# Patient Record
Sex: MALE | Race: WHITE | NOT HISPANIC OR LATINO | URBAN - METROPOLITAN AREA
[De-identification: names, ages, dates, MRNs, and addresses within clinical notes are randomized per-mention and may not be internally consistent; named-entity substitution may affect disease eponyms.]

---

## 2021-03-05 ENCOUNTER — INPATIENT (INPATIENT)
Facility: HOSPITAL | Age: 76
LOS: 4 days | Discharge: HOME | DRG: 197 | End: 2021-03-10
Attending: HOSPITALIST | Admitting: HOSPITALIST
Payer: MEDICARE

## 2021-03-05 VITALS
SYSTOLIC BLOOD PRESSURE: 102 MMHG | OXYGEN SATURATION: 88 % | HEART RATE: 95 BPM | RESPIRATION RATE: 24 BRPM | DIASTOLIC BLOOD PRESSURE: 71 MMHG | WEIGHT: 202.83 LBS

## 2021-03-05 DIAGNOSIS — D53.9 NUTRITIONAL ANEMIA, UNSPECIFIED: ICD-10-CM

## 2021-03-05 DIAGNOSIS — G47.30 SLEEP APNEA, UNSPECIFIED: ICD-10-CM

## 2021-03-05 DIAGNOSIS — I48.91 UNSPECIFIED ATRIAL FIBRILLATION: ICD-10-CM

## 2021-03-05 DIAGNOSIS — Q25.79 OTHER CONGENITAL MALFORMATIONS OF PULMONARY ARTERY: ICD-10-CM

## 2021-03-05 DIAGNOSIS — J47.9 BRONCHIECTASIS, UNCOMPLICATED: ICD-10-CM

## 2021-03-05 DIAGNOSIS — R63.8 OTHER SYMPTOMS AND SIGNS CONCERNING FOOD AND FLUID INTAKE: ICD-10-CM

## 2021-03-05 DIAGNOSIS — R17 UNSPECIFIED JAUNDICE: ICD-10-CM

## 2021-03-05 LAB
ALBUMIN SERPL ELPH-MCNC: 4.5 G/DL — SIGNIFICANT CHANGE UP (ref 3.3–5)
ALP SERPL-CCNC: 83 U/L — SIGNIFICANT CHANGE UP (ref 40–120)
ALT FLD-CCNC: 13 U/L — SIGNIFICANT CHANGE UP (ref 10–45)
ANION GAP SERPL CALC-SCNC: 12 MMOL/L — SIGNIFICANT CHANGE UP (ref 5–17)
ANISOCYTOSIS BLD QL: SIGNIFICANT CHANGE UP
APTT BLD: 29.5 SEC — SIGNIFICANT CHANGE UP (ref 27.5–35.5)
APTT BLD: 31.4 SEC — SIGNIFICANT CHANGE UP (ref 27.5–35.5)
AST SERPL-CCNC: 53 U/L — HIGH (ref 10–40)
BASE EXCESS BLDA CALC-SCNC: -1.2 MMOL/L — SIGNIFICANT CHANGE UP (ref -2–3)
BASE EXCESS BLDA CALC-SCNC: -2 MMOL/L — SIGNIFICANT CHANGE UP (ref -2–3)
BASE EXCESS BLDA CALC-SCNC: -2.7 MMOL/L — LOW (ref -2–3)
BASE EXCESS BLDA CALC-SCNC: -3.1 MMOL/L — LOW (ref -2–3)
BASE EXCESS BLDV CALC-SCNC: -1.5 MMOL/L — SIGNIFICANT CHANGE UP
BASOPHILS # BLD AUTO: 0 K/UL — SIGNIFICANT CHANGE UP (ref 0–0.2)
BASOPHILS NFR BLD AUTO: 0 % — SIGNIFICANT CHANGE UP (ref 0–2)
BILIRUB DIRECT SERPL-MCNC: 1.1 MG/DL — HIGH (ref 0–0.2)
BILIRUB INDIRECT FLD-MCNC: 2.9 MG/DL — HIGH (ref 0.2–1)
BILIRUB SERPL-MCNC: 4 MG/DL — HIGH (ref 0.2–1.2)
BILIRUB SERPL-MCNC: 4.8 MG/DL — HIGH (ref 0.2–1.2)
BLD GP AB SCN SERPL QL: NEGATIVE — SIGNIFICANT CHANGE UP
BUN SERPL-MCNC: 32 MG/DL — HIGH (ref 7–23)
BURR CELLS BLD QL SMEAR: PRESENT — SIGNIFICANT CHANGE UP
CA-I SERPL-SCNC: 1.16 MMOL/L — SIGNIFICANT CHANGE UP (ref 1.12–1.3)
CALCIUM SERPL-MCNC: 9.2 MG/DL — SIGNIFICANT CHANGE UP (ref 8.4–10.5)
CHLORIDE SERPL-SCNC: 100 MMOL/L — SIGNIFICANT CHANGE UP (ref 96–108)
CO2 SERPL-SCNC: 24 MMOL/L — SIGNIFICANT CHANGE UP (ref 22–31)
COHGB MFR BLDA: 1.3 % — SIGNIFICANT CHANGE UP
COHGB MFR BLDA: 1.3 % — SIGNIFICANT CHANGE UP
CREAT SERPL-MCNC: 1.01 MG/DL — SIGNIFICANT CHANGE UP (ref 0.5–1.3)
CRP SERPL-MCNC: 1.5 MG/L — SIGNIFICANT CHANGE UP (ref 0–4)
D DIMER BLD IA.RAPID-MCNC: 930 NG/ML DDU — HIGH
EOSINOPHIL # BLD AUTO: 0.2 K/UL — SIGNIFICANT CHANGE UP (ref 0–0.5)
EOSINOPHIL NFR BLD AUTO: 3.5 % — SIGNIFICANT CHANGE UP (ref 0–6)
FERRITIN SERPL-MCNC: 186 NG/ML — SIGNIFICANT CHANGE UP (ref 30–400)
FERRITIN SERPL-MCNC: 246 NG/ML — SIGNIFICANT CHANGE UP (ref 30–400)
GAS PNL BLDA: SIGNIFICANT CHANGE UP
GAS PNL BLDA: SIGNIFICANT CHANGE UP
GAS PNL BLDV: 139 MMOL/L — SIGNIFICANT CHANGE UP (ref 138–146)
GAS PNL BLDV: SIGNIFICANT CHANGE UP
GAS PNL BLDV: SIGNIFICANT CHANGE UP
GIANT PLATELETS BLD QL SMEAR: PRESENT — SIGNIFICANT CHANGE UP
GLUCOSE SERPL-MCNC: 122 MG/DL — HIGH (ref 70–99)
HAPTOGLOB SERPL-MCNC: <10 MG/DL — LOW (ref 34–200)
HCO3 BLDA-SCNC: 21 MMOL/L — SIGNIFICANT CHANGE UP (ref 21–28)
HCO3 BLDA-SCNC: 21 MMOL/L — SIGNIFICANT CHANGE UP (ref 21–28)
HCO3 BLDA-SCNC: 22 MMOL/L — SIGNIFICANT CHANGE UP (ref 21–28)
HCO3 BLDA-SCNC: 23 MMOL/L — SIGNIFICANT CHANGE UP (ref 21–28)
HCO3 BLDV-SCNC: 25 MMOL/L — SIGNIFICANT CHANGE UP (ref 20–27)
HCT VFR BLD CALC: 38 % — LOW (ref 39–50)
HGB BLD-MCNC: 11.7 G/DL — LOW (ref 13–17)
HGB BLDA-MCNC: 10.5 G/DL — LOW (ref 13–17)
HGB BLDA-MCNC: 11.1 G/DL — LOW (ref 13–17)
HYPOCHROMIA BLD QL: SLIGHT — SIGNIFICANT CHANGE UP
INR BLD: 1.42 — HIGH (ref 0.88–1.16)
INR BLD: 1.48 — HIGH (ref 0.88–1.16)
IRON SATN MFR SERPL: 109 UG/DL — SIGNIFICANT CHANGE UP (ref 45–165)
IRON SATN MFR SERPL: 33 % — SIGNIFICANT CHANGE UP (ref 16–55)
LACTATE SERPL-SCNC: 2.5 MMOL/L — HIGH (ref 0.5–2)
LYMPHOCYTES # BLD AUTO: 0.98 K/UL — LOW (ref 1–3.3)
LYMPHOCYTES # BLD AUTO: 16.8 % — SIGNIFICANT CHANGE UP (ref 13–44)
MACROCYTES BLD QL: SIGNIFICANT CHANGE UP
MANUAL SMEAR VERIFICATION: SIGNIFICANT CHANGE UP
MCHC RBC-ENTMCNC: 30.8 GM/DL — LOW (ref 32–36)
MCHC RBC-ENTMCNC: 40.6 PG — HIGH (ref 27–34)
MCV RBC AUTO: 131.9 FL — HIGH (ref 80–100)
METHGB MFR BLDA: 1.3 % — SIGNIFICANT CHANGE UP
METHGB MFR BLDA: 1.4 % — SIGNIFICANT CHANGE UP
MONOCYTES # BLD AUTO: 0.46 K/UL — SIGNIFICANT CHANGE UP (ref 0–0.9)
MONOCYTES NFR BLD AUTO: 8 % — SIGNIFICANT CHANGE UP (ref 2–14)
NEUTROPHILS # BLD AUTO: 4.17 K/UL — SIGNIFICANT CHANGE UP (ref 1.8–7.4)
NEUTROPHILS NFR BLD AUTO: 71.7 % — SIGNIFICANT CHANGE UP (ref 43–77)
NRBC # BLD: 1 /100 — HIGH (ref 0–0)
NRBC # BLD: SIGNIFICANT CHANGE UP /100 WBCS (ref 0–0)
NT-PROBNP SERPL-SCNC: 1097 PG/ML — HIGH (ref 0–300)
O2 CT VFR BLDA CALC: 13.6 ML/DL — SIGNIFICANT CHANGE UP (ref 15–23)
O2 CT VFR BLDA CALC: 13.8 ML/DL — SIGNIFICANT CHANGE UP (ref 15–23)
O2 CT VFR BLDA CALC: SIGNIFICANT CHANGE UP (ref 15–23)
OXYHGB MFR BLDA: 87 % — LOW (ref 94–100)
OXYHGB MFR BLDA: 89 % — LOW (ref 94–100)
PCO2 BLDA: 32 MMHG — LOW (ref 35–48)
PCO2 BLDA: 34 MMHG — LOW (ref 35–48)
PCO2 BLDA: 36 MMHG — SIGNIFICANT CHANGE UP (ref 35–48)
PCO2 BLDA: 37 MMHG — SIGNIFICANT CHANGE UP (ref 35–48)
PCO2 BLDV: 50 MMHG — SIGNIFICANT CHANGE UP (ref 41–51)
PH BLDA: 7.41 — SIGNIFICANT CHANGE UP (ref 7.35–7.45)
PH BLDA: 7.43 — SIGNIFICANT CHANGE UP (ref 7.35–7.45)
PH BLDV: 7.32 — SIGNIFICANT CHANGE UP (ref 7.32–7.43)
PLAT MORPH BLD: ABNORMAL
PLATELET # BLD AUTO: 161 K/UL — SIGNIFICANT CHANGE UP (ref 150–400)
PO2 BLDA: 131 MMHG — HIGH (ref 83–108)
PO2 BLDA: 175 MMHG — HIGH (ref 83–108)
PO2 BLDA: 219 MMHG — HIGH (ref 83–108)
PO2 BLDA: 227 MMHG — HIGH (ref 83–108)
PO2 BLDV: 28 MMHG — SIGNIFICANT CHANGE UP
POIKILOCYTOSIS BLD QL AUTO: SIGNIFICANT CHANGE UP
POLYCHROMASIA BLD QL SMEAR: SLIGHT — SIGNIFICANT CHANGE UP
POTASSIUM BLDV-SCNC: 4.3 MMOL/L — SIGNIFICANT CHANGE UP (ref 3.5–4.9)
POTASSIUM SERPL-MCNC: 4.5 MMOL/L — SIGNIFICANT CHANGE UP (ref 3.5–5.3)
POTASSIUM SERPL-SCNC: 4.5 MMOL/L — SIGNIFICANT CHANGE UP (ref 3.5–5.3)
PROCALCITONIN SERPL-MCNC: 0.05 NG/ML — SIGNIFICANT CHANGE UP (ref 0.02–0.1)
PROT SERPL-MCNC: 8.2 G/DL — SIGNIFICANT CHANGE UP (ref 6–8.3)
PROTHROM AB SERPL-ACNC: 16.8 SEC — HIGH (ref 10.6–13.6)
PROTHROM AB SERPL-ACNC: 17.4 SEC — HIGH (ref 10.6–13.6)
RBC # BLD: 2.88 M/UL — LOW (ref 4.2–5.8)
RBC # FLD: 14.5 % — SIGNIFICANT CHANGE UP (ref 10.3–14.5)
RBC BLD AUTO: ABNORMAL
RH IG SCN BLD-IMP: NEGATIVE — SIGNIFICANT CHANGE UP
SAO2 % BLDA: 89 % — LOW (ref 95–100)
SAO2 % BLDA: 91 % — LOW (ref 95–100)
SAO2 % BLDA: 91 % — LOW (ref 95–100)
SAO2 % BLDV: 30 % — SIGNIFICANT CHANGE UP
SARS-COV-2 RNA SPEC QL NAA+PROBE: SIGNIFICANT CHANGE UP
SCHISTOCYTES BLD QL AUTO: SIGNIFICANT CHANGE UP
SODIUM SERPL-SCNC: 136 MMOL/L — SIGNIFICANT CHANGE UP (ref 135–145)
SPHEROCYTES BLD QL SMEAR: SLIGHT — SIGNIFICANT CHANGE UP
TIBC SERPL-MCNC: 331 UG/DL — SIGNIFICANT CHANGE UP (ref 220–430)
TROPONIN T SERPL-MCNC: <0.01 NG/ML — SIGNIFICANT CHANGE UP (ref 0–0.01)
UIBC SERPL-MCNC: 222 UG/DL — SIGNIFICANT CHANGE UP (ref 110–370)
WBC # BLD: 5.81 K/UL — SIGNIFICANT CHANGE UP (ref 3.8–10.5)
WBC # FLD AUTO: 5.81 K/UL — SIGNIFICANT CHANGE UP (ref 3.8–10.5)

## 2021-03-05 PROCEDURE — 83020 HEMOGLOBIN ELECTROPHORESIS: CPT | Mod: 26

## 2021-03-05 PROCEDURE — 71275 CT ANGIOGRAPHY CHEST: CPT | Mod: 26,ME

## 2021-03-05 PROCEDURE — 99223 1ST HOSP IP/OBS HIGH 75: CPT | Mod: GC

## 2021-03-05 PROCEDURE — G1004: CPT

## 2021-03-05 PROCEDURE — 86077 PHYS BLOOD BANK SERV XMATCH: CPT

## 2021-03-05 PROCEDURE — 99292 CRITICAL CARE ADDL 30 MIN: CPT

## 2021-03-05 PROCEDURE — 71045 X-RAY EXAM CHEST 1 VIEW: CPT | Mod: 26

## 2021-03-05 PROCEDURE — 99291 CRITICAL CARE FIRST HOUR: CPT

## 2021-03-05 PROCEDURE — 93010 ELECTROCARDIOGRAM REPORT: CPT

## 2021-03-05 PROCEDURE — 74177 CT ABD & PELVIS W/CONTRAST: CPT | Mod: 26,ME

## 2021-03-05 RX ORDER — SODIUM CHLORIDE 9 MG/ML
500 INJECTION INTRAMUSCULAR; INTRAVENOUS; SUBCUTANEOUS ONCE
Refills: 0 | Status: COMPLETED | OUTPATIENT
Start: 2021-03-05 | End: 2021-03-05

## 2021-03-05 RX ORDER — ACETAMINOPHEN 500 MG
650 TABLET ORAL EVERY 6 HOURS
Refills: 0 | Status: DISCONTINUED | OUTPATIENT
Start: 2021-03-05 | End: 2021-03-10

## 2021-03-05 RX ORDER — ENOXAPARIN SODIUM 100 MG/ML
90 INJECTION SUBCUTANEOUS EVERY 12 HOURS
Refills: 0 | Status: DISCONTINUED | OUTPATIENT
Start: 2021-03-05 | End: 2021-03-10

## 2021-03-05 RX ORDER — METOPROLOL TARTRATE 50 MG
0 TABLET ORAL
Qty: 0 | Refills: 0 | DISCHARGE

## 2021-03-05 RX ORDER — LANOLIN ALCOHOL/MO/W.PET/CERES
5 CREAM (GRAM) TOPICAL AT BEDTIME
Refills: 0 | Status: DISCONTINUED | OUTPATIENT
Start: 2021-03-05 | End: 2021-03-10

## 2021-03-05 RX ORDER — WARFARIN SODIUM 2.5 MG/1
0 TABLET ORAL
Qty: 0 | Refills: 0 | DISCHARGE

## 2021-03-05 RX ADMIN — ENOXAPARIN SODIUM 90 MILLIGRAM(S): 100 INJECTION SUBCUTANEOUS at 21:34

## 2021-03-05 RX ADMIN — SODIUM CHLORIDE 1000 MILLILITER(S): 9 INJECTION INTRAMUSCULAR; INTRAVENOUS; SUBCUTANEOUS at 11:10

## 2021-03-05 NOTE — ED PROVIDER NOTE - PHYSICAL EXAMINATION
CONSTITUTIONAL: Awake, alert and in no apparent distress.  HEENT: Head is atraumatic. Eyes clear bilaterally, normal EOMI. Airway patent.  CARDIAC: Normal rate, regular rhythm.  Heart sounds S1, S2.   RESPIRATORY: Breath sounds clear and equal bilaterally. no tachypnea, respiratory distress.   GASTROINTESTINAL: Abdomen soft, non-tender, no guarding, distension.  MUSCULOSKELETAL: Spine appears normal, no midline spinal tenderness, range of motion is not limited, no muscle or joint tenderness. no bony tenderness.   NEUROLOGICAL: Alert, no focal deficits, no motor or sensory deficits.  SKIN: Jaundiced skin. Cyanosis to lips and fingers.  PSYCHIATRIC: Normal mood and affect. no apparent risk to self or others. CONSTITUTIONAL: Awake, alert and in no apparent distress.  HEENT: Head is atraumatic. Eyes clear bilaterally, normal EOMI. Airway patent.  CARDIAC: Normal rate, regular rhythm.  Heart sounds S1, S2.   RESPIRATORY: b/l coarse sounds, no tachypnea, respiratory distress.   GASTROINTESTINAL: Abdomen soft, non-tender, no guarding, distension.  MUSCULOSKELETAL: Spine appears normal, no midline spinal tenderness, range of motion is not limited, no muscle or joint tenderness. no bony tenderness. no peripheral edema  NEUROLOGICAL: Alert, no focal deficits, no motor or sensory deficits.  SKIN: Jaundiced skin. Cyanosis to lips and fingers.  PSYCHIATRIC: Normal mood and affect. no apparent risk to self or others.

## 2021-03-05 NOTE — H&P ADULT - PROBLEM SELECTOR PLAN 4
- Lovenox 90 BID Patient with hx of Afib, on coumadin and toprol as outpatient.  - Lovenox 90 BID  - Holding Toprol at this time

## 2021-03-05 NOTE — CONSULT NOTE ADULT - SUBJECTIVE AND OBJECTIVE BOX
*** INCOMPLETE ***    Patient is a 75y old  Male who presents with a chief complaint of     HPI/HOSPITAL COURSE:  HPI:        INTERVAL EVENTS:    SUBJECTIVE HPI: Patient seen and examined at bedside. Patient resting comfortably, no complaints at this time. Patient denies: fever, chills, weakness, dizziness, headaches, changes in vision, chest pain, palpitations, shortness of breath, cough, N/V, diarrhea or constipation, dysuria, LE edema. ROS otherwise negative.      PAST MEDICAL & SURGICAL HISTORY:  Sleep apnea  non-compliant with BiPaP    Pulmonary lesion    Afib        FAMILY HISTORY:      SOCIAL HISTORY:     -Cigarrettes:     -Alcohol:     -Ilicit Drug Use:    Home Medications:    -toprol 25mg qd    -furosemide 20mg qd    -warfarin 2.5mg qd      VITAL SIGNS:  Vital Signs Last 24 Hrs  T(C): 36.3 (05 Mar 2021 11:45), Max: 36.3 (05 Mar 2021 11:45)  T(F): 97.3 (05 Mar 2021 11:45), Max: 97.3 (05 Mar 2021 11:45)  HR: 74 (05 Mar 2021 14:00) (74 - 95)  BP: 105/66 (05 Mar 2021 14:00) (90/60 - 119/68)  BP(mean): 69 (05 Mar 2021 11:45) (69 - 69)  RR: 20 (05 Mar 2021 14:00) (20 - 24)  SpO2: 94% (05 Mar 2021 14:00) (88% - 95%)      PHYSICAL EXAM:  General: Comfortable, pleasant/anxious/agitated, Ill-appearing, well-nourished/frail/cachectic, comfortable / in distress  Neurological: AAOx3, no focal deficits  HEENT: NC/AT; EOMI, PERRL, clear conjunctiva, no nasal or oropharyngeal discharge or exudates, MMM  Neck: supple, no cervical or post-auricular lymphadenopathy  Cardiovascular: +S1/S2, no murmurs/rubs/gallops, RRR  Respiratory: CTA B/L, no diminished breath sounds, no wheezes/rales/rhonchi, no increased work of breathing or accessory muscle use  Gastrointestinal: soft, NT/ND; active BSx4 quadrants  Genitourinary: no suprapubic tenderness, no CVA tenderness  Extremities: WWP; no edema, clubbing or cyanosis  Vascular: 2+ radial, DP/PT pulses B/L  Skin: no rashes  Lines/Drains:     LABS:                        11.7   5.81  )-----------( 161      ( 05 Mar 2021 11:27 )             38.0     03-05    136  |  100  |  32<H>  ----------------------------<  122<H>  4.5   |  24  |  1.01    Ca    9.2      05 Mar 2021 11:27    TPro  8.2  /  Alb  4.5  /  TBili  4.8<H>  /  DBili  x   /  AST  53<H>  /  ALT  13  /  AlkPhos  83  03-05    PT/INR - ( 05 Mar 2021 11:27 )   PT: 16.8 sec;   INR: 1.42          PTT - ( 05 Mar 2021 11:27 )  PTT:31.4 sec    CARDIAC MARKERS ( 05 Mar 2021 11:27 )  x     / <0.01 ng/mL / x     / x     / x          BNPSerum Pro-Brain Natriuretic Peptide: 1097 pg/mL (03-05 @ 11:27)    ABG - ( 05 Mar 2021 14:11 )  pH, Arterial: 7.41  pH, Blood: x     /  pCO2: 34    /  pO2: 227   / HCO3: 21    / Base Excess: -3.1  /  SaO2: x           RADIOLOGY & ADDITIONAL STUDIES: Reviewed.   *** INCOMPLETE ***    Patient is a 75y old  Male who presents with a chief complaint of     HPI/HOSPITAL COURSE:  HPI:  75M w/ PMH of afib (on coumadin, toprol), [place] (noncompliant with CPAP), who presents to [facility] ED after being sent in by his surgeon Dr. Katarina Mccallum for workup of his painless jaundice.    The pt reports that he has been having progressively worsening dyspnea on exertion, fatigue, and generalized weakness over the past month. He went to see his PCP 2wks ago who noted that he appeared jaundiced, and obtained labs which showed Tbili 6.9. He was referred to surgeon Dr. Mccallum. who wanted him to come into the hospital for evaluation of painless jaundice and elevated CA 19-9 in the setting of dyspnea.    Pt reports that 4yrs ago he was hospitalized at [facility] for 2wks. He does not know what the diagnosis was, but he recalls being asked about dental surgery (which he had prior to the hospitalization), was told "there was something wrong with the aortic valve", and that he was discharged with an IV for long term infusions of what he believes was heparin for 7wks.    In the ED,  Vitals: T 97.3, HR 95, /71, RR 24, O2sat 88% on room air. placed on 3L nc and ventimask with improvement to 93-95%.  Labs: Hb 11.7, .9 | INR 1.42 | Tbili 4.8, AST 53, ALT wnl, alk phos wnl | lactate 2.5 | d-dimer 930  Imaging:    EKG:     CXR:     CT Chest/Abd/Pelvis:  1.  No pulmonary embolism.  2.  Bilateral bronchiectasis and diffuse subpleural reticular opacities with basilar predominance are suggestive of nonspecific interstitial lung disease (pattern favors UIP or fibrotic NSIP)  3.  Diffuse parenchymal mosaic attenuation most likely air trapping.  4.  Mild aneurysmal dilatation aortic root, mid ascending thoracic aorta, aortic arch, and proximal descending thoracic aorta.  5.  Mildly dilated main pulmonary artery, nonspecific, can be seen in pulmonary artery hypertension.  6.  Colonic diverticulosis.  7.  Borderline splenic enlargement, nonspecific. Recommend clinical correlation.  8.  Mildly enlarged prostate. Recommend PSA correlation.    ED Course: Given 500cc NS. ICU consulted for hypoxia. ABG obtained which showed discordance between pO2 and [id number] (ABG: pH 7.41, pCO2 34, pO2 227, HCO3 21, O2sat 91%). Negative for methemoglobinemia. Concern for hemoglobinopathy, limiting oxygenation.       INTERVAL EVENTS:    SUBJECTIVE HPI: Patient seen and examined at bedside. Patient resting comfortably, no complaints at this time. Patient denies: fever, chills, weakness, dizziness, headaches, changes in vision, chest pain, palpitations, shortness of breath, cough, N/V, diarrhea or constipation, dysuria, LE edema. ROS otherwise negative.      PAST MEDICAL & SURGICAL HISTORY:    -sleep apnea - non-compliant with BiPaP    -Afib (on coumadin, eliquis)    SOCIAL HISTORY:     -Cigarrettes: former, 15 cigarettes/day x5yrs     -Alcohol: 5 shots of whiskey, 2x per week     -Ilicit Drug Use: denies    Home Medications:    -toprol 25mg qd    -furosemide 20mg qd    -warfarin 2.5mg qd      VITAL SIGNS:  Vital Signs Last 24 Hrs  T(C): 36.3 (05 Mar 2021 11:45), Max: 36.3 (05 Mar 2021 11:45)  T(F): 97.3 (05 Mar 2021 11:45), Max: 97.3 (05 Mar 2021 11:45)  HR: 74 (05 Mar 2021 14:00) (74 - 95)  BP: 105/66 (05 Mar 2021 14:00) (90/60 - 119/68)  BP(mean): 69 (05 Mar 2021 11:45) (69 - 69)  RR: 20 (05 Mar 2021 14:00) (20 - 24)  SpO2: 94% (05 Mar 2021 14:00) (88% - 95%)      PHYSICAL EXAM:  General: lying in bed, Comfortable, pleasant/anxious/agitated, Ill-appearing, well-nourished/frail/cachectic, comfortable / in distress  Neurological: AAOx3, no focal deficits  HEENT: NC/AT; EOMI, PERRL, clear conjunctiva, no nasal or oropharyngeal discharge or exudates, MMM  Neck: supple, no cervical or post-auricular lymphadenopathy  Cardiovascular: +S1/S2, no murmurs/rubs/gallops, RRR  Respiratory: CTA B/L, no diminished breath sounds, no wheezes/rales/rhonchi, no increased work of breathing or accessory muscle use  Gastrointestinal: soft, NT/ND; active BSx4 quadrants  Genitourinary: no suprapubic tenderness, no CVA tenderness  Extremities: WWP; no edema, clubbing or cyanosis  Vascular: 2+ radial, DP/PT pulses B/L  Skin: no rashes  Lines/Drains:     LABS:                        11.7   5.81  )-----------( 161      ( 05 Mar 2021 11:27 )             38.0     03-05    136  |  100  |  32<H>  ----------------------------<  122<H>  4.5   |  24  |  1.01    Ca    9.2      05 Mar 2021 11:27    TPro  8.2  /  Alb  4.5  /  TBili  4.8<H>  /  DBili  x   /  AST  53<H>  /  ALT  13  /  AlkPhos  83  03-05    PT/INR - ( 05 Mar 2021 11:27 )   PT: 16.8 sec;   INR: 1.42          PTT - ( 05 Mar 2021 11:27 )  PTT:31.4 sec    CARDIAC MARKERS ( 05 Mar 2021 11:27 )  x     / <0.01 ng/mL / x     / x     / x          BNPSerum Pro-Brain Natriuretic Peptide: 1097 pg/mL (03-05 @ 11:27)    ABG - ( 05 Mar 2021 14:11 )  pH, Arterial: 7.41  pH, Blood: x     /  pCO2: 34    /  pO2: 227   / HCO3: 21    / Base Excess: -3.1  /  SaO2: x           RADIOLOGY & ADDITIONAL STUDIES: Reviewed.     *** INCOMPLETE ***    Patient is a 75y old  Male who presents with a chief complaint of     HPI/HOSPITAL COURSE:  HPI:  75M w/ PMH of afib (on coumadin, toprol), [place] (noncompliant with CPAP), who presents to [facility] ED after being sent in by his surgeon Dr. Katarina Mccallum for workup of his painless jaundice.    The pt reports that he has been having progressively worsening dyspnea on exertion, fatigue, and generalized weakness over the past month. He went to see his PCP 2wks ago who noted that he appeared jaundiced, and obtained labs which showed Tbili 6.9. He was referred to surgeon Dr. Mccallum. who wanted him to come into the hospital for evaluation of painless jaundice and elevated CA 19-9 in the setting of dyspnea.    Pt reports that 4yrs ago he was hospitalized at [facility] for 2wks. He does not know what the diagnosis was, but he recalls being asked about dental surgery (which he had prior to the hospitalization), was told "there was something wrong with the aortic valve", and that he was discharged with an IV for long term infusions of what he believes was heparin for 7wks.    In the ED,  Vitals: T 97.3, HR 95, /71, RR 24, O2sat 88% on room air. placed on 3L nc and ventimask with improvement to 93-95%.  Labs: Hb 11.7, .9 | INR 1.42 | Tbili 4.8, AST 53, ALT wnl, alk phos wnl | lactate 2.5 | d-dimer 930  Imaging:    EKG: afib at HR 95    CXR:     CT Chest/Abd/Pelvis:  1.  No pulmonary embolism.  2.  Bilateral bronchiectasis and diffuse subpleural reticular opacities with basilar predominance are suggestive of nonspecific interstitial lung disease (pattern favors UIP or fibrotic NSIP)  3.  Diffuse parenchymal mosaic attenuation most likely air trapping.  4.  Mild aneurysmal dilatation aortic root, mid ascending thoracic aorta, aortic arch, and proximal descending thoracic aorta.  5.  Mildly dilated main pulmonary artery, nonspecific, can be seen in pulmonary artery hypertension.  6.  Colonic diverticulosis.  7.  Borderline splenic enlargement, nonspecific. Recommend clinical correlation.  8.  Mildly enlarged prostate. Recommend PSA correlation.    ED Course: Given 500cc NS. ICU consulted for hypoxia. ABG obtained which showed discordance between pO2 and [id number] (ABG: pH 7.41, pCO2 34, pO2 227, HCO3 21, O2sat 91%). Negative for methemoglobinemia. Concern for hemoglobinopathy, limiting oxygenation.       INTERVAL EVENTS:    SUBJECTIVE HPI: Patient seen and examined at bedside. Patient resting comfortably, no complaints at this time. Patient denies: fever, chills, weakness, dizziness, headaches, changes in vision, chest pain, palpitations, shortness of breath, cough, N/V, diarrhea or constipation, dysuria, LE edema. ROS otherwise negative.      PAST MEDICAL & SURGICAL HISTORY:    -sleep apnea - non-compliant with BiPaP    -Afib (on coumadin, eliquis)    SOCIAL HISTORY:     -Cigarrettes: former, 15 cigarettes/day x5yrs     -Alcohol: 5 shots of whiskey, 2x per week     -Ilicit Drug Use: denies    Home Medications:    -toprol 25mg qd    -furosemide 20mg qd    -warfarin 2.5mg qd      VITAL SIGNS:  Vital Signs Last 24 Hrs  T(C): 36.3 (05 Mar 2021 11:45), Max: 36.3 (05 Mar 2021 11:45)  T(F): 97.3 (05 Mar 2021 11:45), Max: 97.3 (05 Mar 2021 11:45)  HR: 74 (05 Mar 2021 14:00) (74 - 95)  BP: 105/66 (05 Mar 2021 14:00) (90/60 - 119/68)  BP(mean): 69 (05 Mar 2021 11:45) (69 - 69)  RR: 20 (05 Mar 2021 14:00) (20 - 24)  SpO2: 94% (05 Mar 2021 14:00) (88% - 95%)      PHYSICAL EXAM:  General: lying in bed, Comfortable, pleasant/anxious/agitated, Ill-appearing, well-nourished/frail/cachectic, comfortable / in distress  Neurological: AAOx3, no focal deficits  HEENT: NC/AT; EOMI, PERRL, clear conjunctiva, no nasal or oropharyngeal discharge or exudates, MMM  Neck: supple, no cervical or post-auricular lymphadenopathy  Cardiovascular: +S1/S2, no murmurs/rubs/gallops, RRR  Respiratory: CTA B/L, no diminished breath sounds, no wheezes/rales/rhonchi, no increased work of breathing or accessory muscle use  Gastrointestinal: soft, NT/ND; active BSx4 quadrants  Genitourinary: no suprapubic tenderness, no CVA tenderness  Extremities: WWP; no edema, clubbing or cyanosis  Vascular: 2+ radial, DP/PT pulses B/L  Skin: no rashes  Lines/Drains:     LABS:                        11.7   5.81  )-----------( 161      ( 05 Mar 2021 11:27 )             38.0     03-05    136  |  100  |  32<H>  ----------------------------<  122<H>  4.5   |  24  |  1.01    Ca    9.2      05 Mar 2021 11:27    TPro  8.2  /  Alb  4.5  /  TBili  4.8<H>  /  DBili  x   /  AST  53<H>  /  ALT  13  /  AlkPhos  83  03-05    PT/INR - ( 05 Mar 2021 11:27 )   PT: 16.8 sec;   INR: 1.42          PTT - ( 05 Mar 2021 11:27 )  PTT:31.4 sec    CARDIAC MARKERS ( 05 Mar 2021 11:27 )  x     / <0.01 ng/mL / x     / x     / x          BNPSerum Pro-Brain Natriuretic Peptide: 1097 pg/mL (03-05 @ 11:27)    ABG - ( 05 Mar 2021 14:11 )  pH, Arterial: 7.41  pH, Blood: x     /  pCO2: 34    /  pO2: 227   / HCO3: 21    / Base Excess: -3.1  /  SaO2: x           RADIOLOGY & ADDITIONAL STUDIES: Reviewed.     *** INCOMPLETE ***    HPI/HOSPITAL COURSE:  HPI:  75M w/ PMH of afib (on coumadin, toprol), [place] (noncompliant with CPAP), who presents to [facility] ED after being sent in by his surgeon Dr. Katarina Mccallum for workup of his painless jaundice.    The pt reports that he has been having progressively worsening dyspnea on exertion, fatigue, and generalized weakness over the past month. He went to see his PCP 2wks ago who noted that he appeared jaundiced, and obtained labs which showed Tbili 6.9. He was referred to surgeon Dr. Mccallum. who wanted him to come into the hospital for evaluation of painless jaundice and elevated CA 19-9 in the setting of dyspnea.    Pt reports that 4yrs ago he was hospitalized at [facility] for 2wks. He does not know what the diagnosis was, but he recalls being asked about dental surgery (which he had prior to the hospitalization), was told "there was something wrong with the aortic valve", and that he was discharged with an IV for long term infusions of what he believes was heparin for 7wks.    In the ED,  Vitals: T 97.3, HR 95, /71, RR 24, O2sat 88% on room air. placed on 3L nc and ventimask with improvement to 93-95%.  Labs: Hb 11.7, .9 | INR 1.42 | Tbili 4.8, AST 53, ALT wnl, alk phos wnl | lactate 2.5 | d-dimer 930  Imaging:    EKG: afib at HR 95    CT Chest/Abd/Pelvis:  1.  No pulmonary embolism.  2.  Bilateral bronchiectasis and diffuse subpleural reticular opacities with basilar predominance are suggestive of nonspecific interstitial lung disease (pattern favors UIP or fibrotic NSIP)  3.  Diffuse parenchymal mosaic attenuation most likely air trapping.  4.  Mild aneurysmal dilatation aortic root, mid ascending thoracic aorta, aortic arch, and proximal descending thoracic aorta.  5.  Mildly dilated main pulmonary artery, nonspecific, can be seen in pulmonary artery hypertension.  6.  Colonic diverticulosis.  7.  Borderline splenic enlargement, nonspecific. Recommend clinical correlation.  8.  Mildly enlarged prostate. Recommend PSA correlation.    ED Course: Given 500cc NS. ICU consulted for hypoxia. ABG obtained which showed discordance between pO2 and [id number] (ABG: pH 7.41, pCO2 34, pO2 227, HCO3 21, O2sat 91%). Negative for methemoglobinemia. Concern for hemoglobinopathy, limiting oxygenation.     SUBJECTIVE HPI: Patient seen and examined at bedside. Patient resting comfortably, no complaints at this time. Patient denies: fever, chills, weakness, dizziness, headaches, changes in vision, chest pain, palpitations, shortness of breath, cough, N/V, diarrhea or constipation, dysuria, LE edema. ROS otherwise negative.    PAST MEDICAL & SURGICAL HISTORY:    -sleep apnea - non-compliant with BiPaP    -Afib (on coumadin, eliquis)    SOCIAL HISTORY:     -Cigarrettes: former, 15 cigarettes/day x5yrs     -Alcohol: 5 shots of whiskey, 2x per week     -Ilicit Drug Use: denies     -Denies over the counter supplements    Home Medications:    -toprol 25mg qd    -furosemide 20mg qd    -warfarin 2.5mg qd      VITAL SIGNS:  Vital Signs Last 24 Hrs  T(C): 36.3 (05 Mar 2021 11:45), Max: 36.3 (05 Mar 2021 11:45)  T(F): 97.3 (05 Mar 2021 11:45), Max: 97.3 (05 Mar 2021 11:45)  HR: 74 (05 Mar 2021 14:00) (74 - 95)  BP: 105/66 (05 Mar 2021 14:00) (90/60 - 119/68)  BP(mean): 69 (05 Mar 2021 11:45) (69 - 69)  RR: 20 (05 Mar 2021 14:00) (20 - 24)  SpO2: 94% (05 Mar 2021 14:00) (88% - 95%)      PHYSICAL EXAM:  General: lying in bed, jaundiced, not in acute distress  Neurological: AAOx3  HEENT: NC/AT; EOMI, PERRL, clear conjunctiva, no scleral icterus, no nasal or oropharyngeal discharge or exudates, MMM  Neck: supple  Cardiovascular: +S1/S2, no murmurs/rubs/gallops, irregular rhythm  Respiratory: on venturi-mask and nasal cannula, basilar crackles, no diminished breath sounds, no increased work of breathing or accessory muscle use  Gastrointestinal: soft, NT/ND, no hepatomegaly, negative reynaga sign; active BSx4 quadrants  Genitourinary: no suprapubic tenderness  Extremities: WWP; no edema, clubbing or cyanosis  Vascular: 2+ radial, DP/PT pulses B/L  Skin: jaundice      LABS:                        11.7   5.81  )-----------( 161      ( 05 Mar 2021 11:27 )             38.0     03-05    136  |  100  |  32<H>  ----------------------------<  122<H>  4.5   |  24  |  1.01    Ca    9.2      05 Mar 2021 11:27    TPro  8.2  /  Alb  4.5  /  TBili  4.8<H>  /  DBili  x   /  AST  53<H>  /  ALT  13  /  AlkPhos  83  03-05    PT/INR - ( 05 Mar 2021 11:27 )   PT: 16.8 sec;   INR: 1.42          PTT - ( 05 Mar 2021 11:27 )  PTT:31.4 sec    CARDIAC MARKERS ( 05 Mar 2021 11:27 )  x     / <0.01 ng/mL / x     / x     / x        BNPSerum Pro-Brain Natriuretic Peptide: 1097 pg/mL (03-05 @ 11:27)    ABG - ( 05 Mar 2021 14:11 )  pH, Arterial: 7.41  pH, Blood: x     /  pCO2: 34    /  pO2: 227   / HCO3: 21    / Base Excess: -3.1  /  SaO2: x         RADIOLOGY & ADDITIONAL STUDIES: Reviewed.     *** INCOMPLETE ***    HPI/HOSPITAL COURSE:  HPI:  75M w/ PMH of afib (on coumadin, toprol), [place] (noncompliant with CPAP), who presents to [facility] ED after being sent in by his surgeon Dr. Katarina Mccallum for workup of his painless jaundice.    The pt reports that he has been having progressively worsening dyspnea on exertion, fatigue, and generalized weakness over the past month. He went to see his PCP 2wks ago who noted that he appeared jaundiced, and obtained labs which showed Tbili 6.9. He was referred to surgeon Dr. Mccallum. who wanted him to come into the hospital for evaluation of painless jaundice and elevated CA 19-9 in the setting of dyspnea.    Pt reports that 4yrs ago he was hospitalized at [facility] for 2wks. He does not know what the diagnosis was, but he recalls being asked about dental surgery (which he had prior to the hospitalization), was told "there was something wrong with the aortic valve", and that he was discharged with an IV for long term infusions of what he believes was heparin for 7wks.    In the ED,  Vitals: T 97.3, HR 95, /71, RR 24, O2sat 88% on room air. placed on 3L nc and ventimask with improvement to 93-95%.  Labs: Hb 11.7, .9 | INR 1.42 | Tbili 4.8, AST 53, ALT wnl, alk phos wnl | lactate 2.5 | d-dimer 930  Imaging:    EKG: afib at HR 95    CT Chest/Abd/Pelvis:  1.  No pulmonary embolism.  2.  B/l bronchiectasis and diffuse subpleural reticular opacities with basilar predominance are suggestive of nonspecific interstitial lung disease (pattern favors UIP or fibrotic NSIP)  3.  Diffuse parenchymal mosaic attenuation most likely air trapping.  4.  Mild aneurysmal dilatation aortic root, mid ascending thoracic aorta, aortic arch, and proximal descending thoracic aorta.  5.  Mildly dilated main pulmonary artery, nonspecific, can be seen in pulmonary artery hypertension.  6.  Colonic diverticulosis.  7.  Borderline splenic enlargement, nonspecific. Recommend clinical correlation.  8.  Mildly enlarged prostate. Recommend PSA correlation.    ED Course: Given 500cc NS. ICU consulted for hypoxia. ABG obtained which showed discordance between pO2 and [id number] (ABG: pH 7.41, pCO2 34, pO2 227, HCO3 21, O2sat 91%). Negative for methemoglobinemia. Concern for hemoglobinopathy, limiting oxygenation.     SUBJECTIVE HPI: Patient seen and examined at bedside. Patient resting comfortably, no complaints at this time.    PAST MEDICAL & SURGICAL HISTORY:    -sleep apnea - non-compliant with BiPaP    -Afib (on coumadin, eliquis)    SOCIAL HISTORY:     -Cigarrettes: former, 15 cigarettes/day x5yrs     -Alcohol: 5 shots of whiskey, 2x per week     -Ilicit Drug Use: denies     -Denies over the counter supplements    Home Medications:    -toprol 25mg qd    -furosemide 20mg qd    -warfarin 2.5mg qd      VITAL SIGNS:  Vital Signs Last 24 Hrs  T(C): 36.3 (05 Mar 2021 11:45), Max: 36.3 (05 Mar 2021 11:45)  T(F): 97.3 (05 Mar 2021 11:45), Max: 97.3 (05 Mar 2021 11:45)  HR: 74 (05 Mar 2021 14:00) (74 - 95)  BP: 105/66 (05 Mar 2021 14:00) (90/60 - 119/68)  BP(mean): 69 (05 Mar 2021 11:45) (69 - 69)  RR: 20 (05 Mar 2021 14:00) (20 - 24)  SpO2: 94% (05 Mar 2021 14:00) (88% - 95%)      PHYSICAL EXAM:  General: lying in bed, jaundiced, not in acute distress  Neurological: AAOx3  HEENT: NC/AT; EOMI, PERRL, clear conjunctiva, no scleral icterus, no nasal or oropharyngeal discharge or exudates, MMM  Neck: supple  Cardiovascular: +S1/S2, no murmurs/rubs/gallops, irregular rhythm  Respiratory: on venturi-mask and nasal cannula, basilar crackles, no diminished breath sounds, no increased work of breathing or accessory muscle use  Gastrointestinal: soft, NT/ND, no hepatomegaly, negative reynaga sign; active BSx4 quadrants  Genitourinary: no suprapubic tenderness  Extremities: WWP; no edema, clubbing or cyanosis  Vascular: 2+ radial, DP/PT pulses B/L  Skin: jaundice      LABS:                        11.7   5.81  )-----------( 161      ( 05 Mar 2021 11:27 )             38.0     03-05    136  |  100  |  32<H>  ----------------------------<  122<H>  4.5   |  24  |  1.01    Ca    9.2      05 Mar 2021 11:27    TPro  8.2  /  Alb  4.5  /  TBili  4.8<H>  /  DBili  x   /  AST  53<H>  /  ALT  13  /  AlkPhos  83  03-05    PT/INR - ( 05 Mar 2021 11:27 )   PT: 16.8 sec;   INR: 1.42     PTT - ( 05 Mar 2021 11:27 )  PTT:31.4 sec    CARDIAC MARKERS ( 05 Mar 2021 11:27 )  x     / <0.01 ng/mL / x     / x     / x        BNPSerum Pro-Brain Natriuretic Peptide: 1097 pg/mL (03-05 @ 11:27)    ABG - ( 05 Mar 2021 14:11 )  pH, Arterial: 7.41  pH, Blood: x     /  pCO2: 34    /  pO2: 227   / HCO3: 21    / Base Excess: -3.1  /  SaO2: x         RADIOLOGY & ADDITIONAL STUDIES: Reviewed. HPI/HOSPITAL COURSE:  HPI:  75M w/ PMH of afib (on coumadin, toprol), [place] (noncompliant with CPAP), who presents to [facility] ED after being sent in by his surgeon Dr. Katarina Mccallum for workup of his painless jaundice.    The pt reports that he has been having progressively worsening dyspnea on exertion, fatigue, and generalized weakness over the past month. He went to see his PCP 2wks ago who noted that he appeared jaundiced, and obtained labs which showed Tbili 6.9. He was referred to surgeon Dr. Mccallum. who wanted him to come into the hospital for evaluation of painless jaundice and elevated CA 19-9 in the setting of dyspnea.    Pt reports that 4yrs ago he was hospitalized at [facility] for 2wks. He does not know what the diagnosis was, but he recalls being asked about dental surgery (which he had prior to the hospitalization), was told "there was something wrong with the aortic valve", and that he was discharged with an IV for long term infusions of what he believes was heparin for 7wks.    In the ED,  Vitals: T 97.3, HR 95, /71, RR 24, O2sat 88% on room air. placed on 3L nc and ventimask with improvement to 93-95%.  Labs: Hb 11.7, .9 | INR 1.42 | Tbili 4.8, AST 53, ALT wnl, alk phos wnl | lactate 2.5 | d-dimer 930  Imaging:    EKG: afib at HR 95    CT Chest/Abd/Pelvis:  1.  No pulmonary embolism.  2.  B/l bronchiectasis and diffuse subpleural reticular opacities with basilar predominance are suggestive of nonspecific interstitial lung disease (pattern favors UIP or fibrotic NSIP)  3.  Diffuse parenchymal mosaic attenuation most likely air trapping.  4.  Mild aneurysmal dilatation aortic root, mid ascending thoracic aorta, aortic arch, and proximal descending thoracic aorta.  5.  Mildly dilated main pulmonary artery, nonspecific, can be seen in pulmonary artery hypertension.  6.  Colonic diverticulosis.  7.  Borderline splenic enlargement, nonspecific. Recommend clinical correlation.  8.  Mildly enlarged prostate. Recommend PSA correlation.    ED Course: Given 500cc NS. ICU consulted for hypoxia. ABG obtained which showed discordance between pO2 and [id number] (ABG: pH 7.41, pCO2 34, pO2 227, HCO3 21, O2sat 91%). Negative for methemoglobinemia. Concern for hemoglobinopathy, limiting oxygenation.     SUBJECTIVE HPI: Patient seen and examined at bedside. Patient resting comfortably, no complaints at this time.    PAST MEDICAL & SURGICAL HISTORY:    -sleep apnea - non-compliant with BiPaP    -Afib (on coumadin, eliquis)    SOCIAL HISTORY:     -Cigarrettes: former (quit 25yrs ago), 15 cigarettes/day x5yrs     -Alcohol: 5 shots of whiskey, 2x per week     -Ilicit Drug Use: denies     -Denies over the counter supplements    Home Medications:    -toprol 25mg qd    -furosemide 20mg qd    -warfarin 2.5mg qd      VITAL SIGNS:  Vital Signs Last 24 Hrs  T(C): 36.3 (05 Mar 2021 11:45), Max: 36.3 (05 Mar 2021 11:45)  T(F): 97.3 (05 Mar 2021 11:45), Max: 97.3 (05 Mar 2021 11:45)  HR: 74 (05 Mar 2021 14:00) (74 - 95)  BP: 105/66 (05 Mar 2021 14:00) (90/60 - 119/68)  BP(mean): 69 (05 Mar 2021 11:45) (69 - 69)  RR: 20 (05 Mar 2021 14:00) (20 - 24)  SpO2: 94% (05 Mar 2021 14:00) (88% - 95%)      PHYSICAL EXAM:  General: lying in bed, jaundiced, not in acute distress  Neurological: AAOx3  HEENT: NC/AT; EOMI, PERRL, clear conjunctiva, no scleral icterus, no nasal or oropharyngeal discharge or exudates, MMM  Neck: supple  Cardiovascular: +S1/S2, no murmurs/rubs/gallops, irregular rhythm  Respiratory: on venturi-mask and nasal cannula, basilar crackles, no diminished breath sounds, no increased work of breathing or accessory muscle use  Gastrointestinal: soft, NT/ND, no hepatomegaly, negative reynaga sign; active BSx4 quadrants  Genitourinary: no suprapubic tenderness  Extremities: WWP; no edema, clubbing or cyanosis  Vascular: 2+ radial, DP/PT pulses B/L  Skin: jaundice      LABS:                        11.7   5.81  )-----------( 161      ( 05 Mar 2021 11:27 )             38.0     03-05    136  |  100  |  32<H>  ----------------------------<  122<H>  4.5   |  24  |  1.01    Ca    9.2      05 Mar 2021 11:27    TPro  8.2  /  Alb  4.5  /  TBili  4.8<H>  /  DBili  x   /  AST  53<H>  /  ALT  13  /  AlkPhos  83  03-05    PT/INR - ( 05 Mar 2021 11:27 )   PT: 16.8 sec;   INR: 1.42     PTT - ( 05 Mar 2021 11:27 )  PTT:31.4 sec    CARDIAC MARKERS ( 05 Mar 2021 11:27 )  x     / <0.01 ng/mL / x     / x     / x        BNPSerum Pro-Brain Natriuretic Peptide: 1097 pg/mL (03-05 @ 11:27)    ABG - ( 05 Mar 2021 14:11 )  pH, Arterial: 7.41  pH, Blood: x     /  pCO2: 34    /  pO2: 227   / HCO3: 21    / Base Excess: -3.1  /  SaO2: x         RADIOLOGY & ADDITIONAL STUDIES: Reviewed.

## 2021-03-05 NOTE — CONSULT NOTE ADULT - ASSESSMENT
75M w/ PMH of afib (on coumadin, toprol), ORLANDO (noncompliant with CPAP), who presents to [facility] ED after being sent in by his surgeon for workup of his painless jaundice, found to be hypoxic to 88% w/ discordance between pO2 and [id number] despite supplemental O2. ICU consulted for hypoxia, recommending admission to telemetry for evaluation of hypoxia. 75M w/ PMH of afib (on coumadin, toprol), ORLANDO (noncompliant with CPAP), who presents to [facility] ED after being sent in by his surgeon for workup of his painless jaundice, found to be hypoxic to 88% w/ discordance between pO2 and [id number] despite supplemental O2. ICU consulted for hypoxia, recommending admission to telemetry for evaluation of hypoxia.    NEURO: no acute issues    CARDIOVASCULAR  #Afib  -home medication: coumadin, toprol 25mg qd  -INR subtherapeutic on admission, 1.42  Recommendations:    -start lovenox 90mg q12hrs for full anticoagulation    -C/w toprol 25mg qd for rate control    PULMONARY  #Hypoxia  -CTA negative for PE.  CT s/f B/l bronchiectasis and diffuse subpleural reticular opacities with basilar predominance are suggestive of nonspecific interstitial lung disease (pattern favors UIP or fibrotic NSIP),  Diffuse parenchymal mosaic attenuation most likely air trapping.  -Despite supplemental O2, ABG w/ dissociation between pO2 and T7tvjwctffup (ABG: pH 7.41, pCO2 34, pO2 227, HCO3 21, O2sat 91%).  -Etiology of hypoxia and dissociation unclear. This pattern is typically seen with methemoglobinemia, however carboxyhemoglobin and methemoglobin insignificant on co-oximetry. Pt may have hemoglobinopathy which would prevent O2 binding.   Recommendations:    -f/u Hb electrophoresis    -f/u repeat co-oximetry    -appreciate heme-onc recommendations    -Maintain O2sat >90% via supplemental O2    HEME-ONC  #Macrocytosis  Hb 11.7 w/ .9. EtOH use not significant enough to cause this degree of macrocytosis  -Heme-onc consulted, f/u recommendations  -F/u B12, folate, iron studies, reticulocytes, LDH, haptoglobin, cryoglobulins, cold agglutinins, mycoplasma titers, SPEP, immunofixation    GI & HEPATOBILIARY  #Painless jaundice  Tbili 4.8 on admission, decreased from 6.9 on outpatient labs. Follows with Dr. Mccallum (surgery), who was notified of admission. Per Dr. Mccallum, surgery to follow patient.    ENDO: no active issues  Case discussed with Dr. Charlton, ICU fellow, and primary admitting team.

## 2021-03-05 NOTE — ED PROVIDER NOTE - OBJECTIVE STATEMENT
76 y/o F here today with weakness, fatigue, sob, and jaundice x 1 month. Pt has a chronic cough. She notes darkening of urine color. Denies fevers, chills, and abdominal pain. Pt is currently on warfarin and metoprolol. 76 y/o M hx of afib here today with weakness, fatigue, sob, and jaundice x 1 month. Pt poor historian, states unclear why he takes warfarin, per PCP for afib.  Pt has a chronic cough, notes darkening of urine color. Denies fevers, chills, and abdominal pain. Pt is currently on warfarin and metoprolol. States non compliance with CPAP at night.

## 2021-03-05 NOTE — H&P ADULT - NSHPLABSRESULTS_GEN_ALL_CORE
LABS:                         11.7   5.81  )-----------( 161      ( 05 Mar 2021 11:27 )             38.0     03-05    138  |  106  |  29<H>  ----------------------------<  98  4.1   |  21<L>  |  0.83    Ca    8.8      05 Mar 2021 15:53    TPro  6.6  /  Alb  3.8  /  TBili  4.0<H>  /  DBili  1.1<H>  /  AST  29  /  ALT  9<L>  /  AlkPhos  68  03-05    PT/INR - ( 05 Mar 2021 11:27 )   PT: 16.8 sec;   INR: 1.42          PTT - ( 05 Mar 2021 11:27 )  PTT:31.4 sec    CARDIAC MARKERS ( 05 Mar 2021 11:27 )  x     / <0.01 ng/mL / x     / x     / x          Serum Pro-Brain Natriuretic Peptide: 1097 pg/mL (03-05 @ 11:27)    Lactate, Blood: 2.5 mmol/L (03-05 @ 11:27)      RADIOLOGY, EKG & ADDITIONAL TESTS: Reviewed.     < from: CT Abdomen and Pelvis w/ IV Cont (03.05.21 @ 13:51) >  1.  No pulmonary embolism.  2.  Bilateral bronchiectasis and diffuse subpleural reticular opacities with basilar predominance are suggestive of nonspecific interstitial lung disease (pattern favors UIP or fibrotic NSIP)  3.  Diffuse parenchymal mosaic attenuation most likely air trapping.  4.  Mild aneurysmal dilatation aortic root, mid ascending thoracic aorta, aortic arch, and proximal descending thoracic aorta.  5.  Mildly dilated main pulmonary artery, nonspecific, can be seen in pulmonary artery hypertension.  6.  Colonic diverticulosis.  7.  Borderline splenic enlargement, nonspecific. Recommend clinical correlation.  8.  Mildly enlarged prostate. Recommend PSA correlation.

## 2021-03-05 NOTE — ED ADULT TRIAGE NOTE - CHIEF COMPLAINT QUOTE
76 y/o male came in to ED c/o progressive SOB  and jaundice for the past "Few weeks" Pt noted to to have cyanotic lips.

## 2021-03-05 NOTE — H&P ADULT - NSHPSOCIALHISTORY_GEN_ALL_CORE
-Cigarettes: former, 15 cigarettes/day x5yrs  -Alcohol: 5 shots of whiskey, 2x per week  -Illicit Drug Use: denies  -Denies over the counter supplements

## 2021-03-05 NOTE — ED ADULT NURSE REASSESSMENT NOTE - NS ED NURSE REASSESS COMMENT FT1
Pulse Oximetry Waveform inconsistent, SPO2 MS infrequently correlating with ecg MS, POX on O2 fluctuating between 88%-96%.  Fingernails noted to be cyanotic as well as lips  VBG sent   Clinically: Mild dyspnea noted. Patient is able to speak in clear full sentences. Mild increased work of breathing noted at rest. As per patient this is baseline, VIGIL reported to be new and alarming to patient.   Pt also endorses Dx of sleep apnea where he is to be using a BiPaP @ HS however has been non-compliant with this.

## 2021-03-05 NOTE — H&P ADULT - PROBLEM SELECTOR PLAN 1
- Heme/onc consulted, recs appreciated On admission, Hb 11.7 with .9 in setting of painless jaundice, concern for hemolysis. Schistocytes on smear.   - Per heme/onc, ordered cold agglutin, alka, cryo, spep, immunofixation, b12, folate, hapto, ldh, smear  - Heme/onc consulted, recs appreciated On admission, Hb 11.7 with .9 in setting of painless jaundice, concern for hemolysis. Schistocytes on smear.   - Per heme/onc, ordered cold agglutinin, alka, cryo, SPEP, immunofixation, b12, folate, hapto, LDH, smear, Hb electrophoresis  - Repeat ABG with co-oximetry   - Heme/onc consulted, recs appreciated

## 2021-03-05 NOTE — H&P ADULT - HISTORY OF PRESENT ILLNESS
**Incomplete Note**    75M w/ PMH of afib (on coumadin, toprol), ORLANDO (noncompliant with CPAP), who presents to [facility] ED after being sent in by his surgeon Dr. Katarina Mccallum for workup of his painless jaundice.    The pt reports that he has been having progressively worsening dyspnea on exertion, fatigue, and generalized weakness over the past month. He went to see his PCP 2wks ago who noted that he appeared jaundiced, and obtained labs which showed Tbili 6.9. He was referred to surgeon Dr. Mccallum who wanted him to come into the hospital for evaluation of painless jaundice and elevated CA 19-9 in the setting of dyspnea.    Pt reports that 4yrs ago he was hospitalized at [facility] for 2wks. He does not know what the diagnosis was, but he recalls being asked about dental surgery (which he had prior to the hospitalization), was told "there was something wrong with the aortic valve", and that he was discharged with an IV for long term infusions of what he believes was heparin for 7wks.    In the ED,  Vitals: T 97.3, HR 95, /71, RR 24, O2sat 88% on room air. placed on 3L nc and ventimask with improvement to 93-95%.  Labs: Hb 11.7, .9 | INR 1.42 | Tbili 4.8, AST 53, ALT wnl, alk phos wnl | lactate 2.5 | d-dimer 930  Imaging:    EKG: afib at HR 95    CT Chest/Abd/Pelvis:  1.  No pulmonary embolism.  2.  Bilateral bronchiectasis and diffuse subpleural reticular opacities with basilar predominance are suggestive of nonspecific interstitial lung disease (pattern favors UIP or fibrotic NSIP)  3.  Diffuse parenchymal mosaic attenuation most likely air trapping.  4.  Mild aneurysmal dilatation aortic root, mid ascending thoracic aorta, aortic arch, and proximal descending thoracic aorta.  5.  Mildly dilated main pulmonary artery, nonspecific, can be seen in pulmonary artery hypertension.  6.  Colonic diverticulosis.  7.  Borderline splenic enlargement, nonspecific. Recommend clinical correlation.  8.  Mildly enlarged prostate. Recommend PSA correlation.    ED Course: Given 500cc NS. ICU consulted for hypoxia. ABG obtained which showed discordance between pO2 and O3xtizeefdpd (ABG: pH 7.41, pCO2 34, pO2 227, HCO3 21, O2sat 91%). Negative for methemoglobinemia. Concern for hemoglobinopathy, limiting oxygenation.  75M w/ PMH of afib (on coumadin, toprol), ORLANDO (noncompliant with CPAP), who presents to [facility] ED after being sent in by his surgeon Dr. Katarina Mccallum for workup of his painless jaundice.    The pt reports that he has been having progressively worsening dyspnea on exertion, fatigue, and generalized weakness over the past month. He went to see his PCP 2wks ago who noted that he appeared jaundiced, and obtained labs which showed Tbili 6.9. He was referred to surgeon Dr. Mccallum who wanted him to come into the hospital for evaluation of painless jaundice and elevated CA 19-9 in the setting of dyspnea.    Pt reports that 4yrs ago he was hospitalized at [facility] for 2wks. He does not know what the diagnosis was, but he recalls being asked about dental surgery (which he had prior to the hospitalization), was told "there was something wrong with the aortic valve", and that he was discharged with an IV for long term infusions of what he believes was heparin for 7wks.    In the ED,  Vitals: T 97.3, HR 95, /71, RR 24, O2sat 88% on room air. placed on 3L nc and ventimask with improvement to 93-95%.  Labs: Hb 11.7, .9 | INR 1.42 | Tbili 4.8, AST 53, ALT wnl, alk phos wnl | lactate 2.5 | d-dimer 930  Imaging:    EKG: afib at HR 95    CT Chest/Abd/Pelvis:  1.  No pulmonary embolism.  2.  Bilateral bronchiectasis and diffuse subpleural reticular opacities with basilar predominance are suggestive of nonspecific interstitial lung disease (pattern favors UIP or fibrotic NSIP)  3.  Diffuse parenchymal mosaic attenuation most likely air trapping.  4.  Mild aneurysmal dilatation aortic root, mid ascending thoracic aorta, aortic arch, and proximal descending thoracic aorta.  5.  Mildly dilated main pulmonary artery, nonspecific, can be seen in pulmonary artery hypertension.  6.  Colonic diverticulosis.  7.  Borderline splenic enlargement, nonspecific. Recommend clinical correlation.  8.  Mildly enlarged prostate. Recommend PSA correlation.    ED Course: Given 500cc NS. ICU consulted for hypoxia. ABG obtained which showed discordance between pO2 and C3lcwebyvjpv (ABG: pH 7.41, pCO2 34, pO2 227, HCO3 21, O2sat 91%). Negative for methemoglobinemia. Concern for hemoglobinopathy, limiting oxygenation.

## 2021-03-05 NOTE — H&P ADULT - PROBLEM SELECTOR PLAN 6
F: None  E: Replete PRN  N: Diet, Regular   DVT ppx: Lovenox   GI ppx: None  Bowel: None  Dispo: 7LA    FULL CODE

## 2021-03-05 NOTE — H&P ADULT - NSHPPHYSICALEXAM_GEN_ALL_CORE
VITAL SIGNS:  T(C): 36.6 (03-05-21 @ 16:41), Max: 36.6 (03-05-21 @ 16:41)  T(F): 97.8 (03-05-21 @ 16:41), Max: 97.8 (03-05-21 @ 16:41)  HR: 73 (03-05-21 @ 16:41) (73 - 95)  BP: 102/62 (03-05-21 @ 16:41) (90/60 - 119/68)  BP(mean): 69 (03-05-21 @ 11:45) (69 - 69)  RR: 20 (03-05-21 @ 16:41) (20 - 24)    PHYSICAL EXAM:    Constitutional: WDWN resting comfortably in bed; NAD  Head: NC/AT  Eyes: PERRL, EOMI, clear conjunctiva  ENT: no nasal discharge; uvula midline, no oropharyngeal erythema or exudates; MMM  Neck: supple; no JVD or thyromegaly  Respiratory: CTA B/L; no W/R/R, no retractions  Cardiac: +S1/S2; RRR; no M/R/G;  Gastrointestinal: soft, NT/ND; no rebound or guarding; +BSx4  Extremities: WWP, no clubbing or cyanosis; no peripheral edema  Musculoskeletal: NROM x4; no joint swelling, tenderness or erythema  Vascular: 2+ radial, femoral, DP/PT pulses B/L  Dermatologic: skin warm, dry and intact; no rashes, wounds, or scars  Neurologic: AAOx3; CNII-XII grossly intact; no focal deficits  Psychiatric: affect and characteristics of appearance, verbalizations, behaviors are appropriate VITAL SIGNS:  T(C): 36.6 (03-05-21 @ 16:41), Max: 36.6 (03-05-21 @ 16:41)  T(F): 97.8 (03-05-21 @ 16:41), Max: 97.8 (03-05-21 @ 16:41)  HR: 73 (03-05-21 @ 16:41) (73 - 95)  BP: 102/62 (03-05-21 @ 16:41) (90/60 - 119/68)  BP(mean): 69 (03-05-21 @ 11:45) (69 - 69)  RR: 20 (03-05-21 @ 16:41) (20 - 24)    PHYSICAL EXAM:  General: lying in bed, jaundiced, not in acute distress  Neurological: AAOx3  HEENT: NC/AT; EOMI, PERRL, clear conjunctiva, no scleral icterus, no nasal or oropharyngeal discharge or exudates, MMM  Neck: supple  Cardiovascular: +S1/S2, no murmurs/rubs/gallops, irregular rhythm  Respiratory: on venturi-mask and nasal cannula, basilar crackles, no diminished breath sounds, no increased work of breathing or accessory muscle use  Gastrointestinal: soft, NT/ND, no hepatomegaly, negative reynaga sign; active BSx4 quadrants  Genitourinary: no suprapubic tenderness  Extremities: WWP; no edema, clubbing or cyanosis  Vascular: 2+ radial, DP/PT pulses B/L  Skin: jaundice

## 2021-03-05 NOTE — ED ADULT NURSE REASSESSMENT NOTE - NS ED NURSE REASSESS COMMENT FT1
** DELAY IN MEDICATION ADMIN **  PT DUE FOR THERAPEUTIC DOSE OF LOVENOX. ORDERED DOSE NOT AVAILABLE IN ED. REQUESTED DOSE FROM ED PHARMACIST AND MAIN PHARMACY TECH. AT THIS TIME MEDICATION YET TO BE RECEIVED. COMMUNICATED AND ENDORSED TO RECEIVING UNIT RN OF PENDING MEDICATION DOSE NEEDED IF NOT RECEIVED BY TIME OF TRANSPORT TO RECEIVING UNIT FROM ED.

## 2021-03-05 NOTE — CONSULT NOTE ADULT - SUBJECTIVE AND OBJECTIVE BOX
Hematology Oncology Consult Note (Dr. Vargas )  Discussed with Dr. Vargas and recommendations reviewed with the primary team.    Interval HPI: Mr. Cooney is a 75 year old male with PMH of Afib on coumadin and ORLANDO (noncompliant with CPAP) sent in by his surgeon Dr. Katarina Mccallum for workup of his painless jaundice. Patient reports generalized weakness, worsening VIGIL and fatigue over the past month, getting worse in the last 2 weeks. He was seen by his PCP 2 weeks ago and was noted to have jaundice and was referred to Dr. Mccallum for further evaluation, which showed elevated CA 19-9. Of note, he reports a prior hospitalization at [facility] for ?aortic valve infection.     ED Course:   Vitals: T 97.3, HR 95, /71, RR 24, O2sat 88% on room air. placed on 3L nc and ventimask with improvement to 93-95%.  Labs: Hb 11.7, .9 | INR 1.42 | Tbili 4.8, AST 53, ALT wnl, alk phos wnl | lactate 2.5 | d-dimer 930  EKG with A.fib with normal HR.   CTA with no evidence of PE, bilateral bronchiectasis and diffuse subpleural reticular opacities with basilar predominance are suggestive of nonspecific interstitial lung disease (pattern favors UIP or fibrotic NSIP), Diffuse parenchymal mosaic attenuation most likely air trapping.  Given 500cc NS. ICU consulted for hypoxia. ABG obtained which showed discordance between pO2 and G5sbsuzdmkvk (ABG: pH 7.41, pCO2 34, pO2 227, HCO3 21, O2sat 91%). Negative for methemoglobinemia. Concern for hemoglobinopathy, limiting oxygenation.    Hematology consulted for further evaluation of macrocytic anemia. Patient seen and examined. Reports difficult breathing and palpitations. Denies any chest pain, headache, confusion, blurry vision, abdominal pain, n/v/d, leg pain or swelling. He denies any prior episode of jaundice in the past. He denies any recent antibiotic use or new medications.     PAST MEDICAL & SURGICAL HISTORY:  Sleep apnea, non-compliant with BiPaP  Afib  No significant past surgical history    Allergies: NKDA    Medications:  Home Medications:  metoprolol:  (05 Mar 2021 14:29)  warfarin:  (05 Mar 2021 14:29)  Furosemide     MEDICATIONS  (STANDING):  enoxaparin Injectable 90 milliGRAM(s) SubCutaneous every 12 hours    MEDICATIONS  (PRN):    Social History: Former smoker, 15 cig/day x 5 years   5 shots of whiskey, 2x per week. Denies any ilicit drug use.       PHYSICAL EXAM:    ICU Vital Signs Last 24 Hrs  T(C): 36.6 (05 Mar 2021 16:41), Max: 36.6 (05 Mar 2021 16:41)  T(F): 97.8 (05 Mar 2021 16:41), Max: 97.8 (05 Mar 2021 16:41)  HR: 73 (05 Mar 2021 16:41) (73 - 95)  BP: 102/62 (05 Mar 2021 16:41) (90/60 - 119/68)  BP(mean): 69 (05 Mar 2021 11:45) (69 - 69)  RR: 20 (05 Mar 2021 16:41) (20 - 24)  SpO2: 90% (05 Mar 2021 16:41) (88% - 95%)    Gen: lying in bed, jaundiced, in no acute distress  HEENT: normocephalic/atraumatic, no conjunctival pallor, + scleral icterus, no oral thrush/mucosal bleeding/mucositis  Neck: supple, no masses  Cardiovascular: irregular, nl S1S2, no murmurs  Respiratory: on VM, sat 85-89%, + crackles b/l   Gastrointestinal: BS+, soft, NT/ND, no masses, no splenomegaly, no hepatomegaly, no evidence for ascites  Extremities: no edema, no calf tenderness  Neurological: AAOx3, no focal deficits  Skin: jaundice   Lymph Nodes: no cervical/supraclavicular LAD  Musculoskeletal:  full ROM  Psychiatric:  mood stable      Labs:                          11.7   5.81  )-----------( 161      ( 05 Mar 2021 11:27 )             38.0     CBC Full  -  ( 05 Mar 2021 11:27 )  WBC Count : 5.81 K/uL  RBC Count : 2.88 M/uL  Hemoglobin : 11.7 g/dL  Hematocrit : 38.0 %  Platelet Count - Automated : 161 K/uL  Mean Cell Volume : 131.9 fl  Mean Cell Hemoglobin : 40.6 pg  Mean Cell Hemoglobin Concentration : 30.8 gm/dL  Auto Neutrophil # : 4.17 K/uL  Auto Lymphocyte # : 0.98 K/uL  Auto Monocyte # : 0.46 K/uL  Auto Eosinophil # : 0.20 K/uL  Auto Basophil # : 0.00 K/uL  Auto Neutrophil % : 71.7 %  Auto Lymphocyte % : 16.8 %  Auto Monocyte % : 8.0 %  Auto Eosinophil % : 3.5 %  Auto Basophil % : 0.0 %    PT/INR - ( 05 Mar 2021 11:27 )   PT: 16.8 sec;   INR: 1.42       PTT - ( 05 Mar 2021 11:27 )  PTT:31.4 sec    03-05    138  |  106  |  29<H>  ----------------------------<  98  4.1   |  21<L>  |  0.83    Ca    8.8      05 Mar 2021 15:53    TPro  6.6  /  Alb  3.8  /  TBili  4.0<H>  /  DBili  1.1<H>  /  AST  29  /  ALT  9<L>  /  AlkPhos  68  03-05    Lactate Dehydrogenase, Serum (03.05.21 @ 15:53)   Lactate Dehydrogenase, Serum: 476 U/L   Bilirubin - Total and Direct (03.05.21 @ 15:53)   Bilirubin Direct, Serum: 1.1 mg/dL   Bilirubin Total, Serum: 4.0 mg/dL   Indirect Reacting Bilirubin: 2.9 mg/dL   Co-Ox Profile - Arterial (03.05.21 @ 14:11)   O2 Saturation, Measured Arterial: 91 %   pH, Arterial: 7.41   pCO2, Arterial: 34 mmHg   pO2, Arterial: 227 mmHg   HCO3, Arterial: 21 mmol/L   Base Excess, Arterial: -3.1 mmol/L   Oxygen Content, Arterial: 13.6 mL/dL   Total Hemoglobin, Arterial: 10.5 g/dL   Oxyhemoglobin, Arterial: 89 %   Carboxyhemoglobin, Arterial: 1.3 %   Methemoglobin, Arterial: 1.4 %  Ferritin, Serum (03.05.21 @ 11:27)   Ferritin, Serum: 246 ng/mL     Imaging Studies:    < from: CT Abdomen and Pelvis w/ IV Cont (03.05.21 @ 13:51) >  IMPRESSION:    1.  No pulmonary embolism.  2.  Bilateral bronchiectasis and diffuse subpleural reticular opacities with basilar predominance are suggestive of nonspecific interstitial lung disease (pattern favors UIP or fibrotic NSIP)  3.  Diffuse parenchymal mosaic attenuation most likely air trapping.  4.  Mild aneurysmal dilatation aortic root, mid ascending thoracic aorta, aortic arch, and proximal descending thoracic aorta.  5.  Mildly dilated main pulmonary artery, nonspecific, can be seen in pulmonary artery hypertension.  6.  Colonic diverticulosis.  7.  Borderline splenic enlargement, nonspecific. Recommend clinical correlation.  8.  Mildly enlarged prostate. Recommend PSA correlation.    < end of copied text >

## 2021-03-05 NOTE — H&P ADULT - PROBLEM SELECTOR PLAN 3
Non-compliant with CPAP. Non-compliant with CPAP. Hx notable for recent SOB/VIGIL. On admission, hypoxic to mid 80s on room air requiring O2 supplementation with NC/Venturi mask.  - Currently satting 88-91% on 4L NC  - Wean O2 requirements as tolerated

## 2021-03-05 NOTE — H&P ADULT - ASSESSMENT
75M w/ PMH of afib (on coumadin, toprol), ORLANDO (noncompliant with CPAP), who presents to [facility] ED after being sent in by his surgeon Dr. Katarina Mccallum for workup of his painless jaundice. Patient is being admitted to [facility] for further management of macrocytic anemia of unknown origin with concern for hemolysis.

## 2021-03-05 NOTE — ED ADULT NURSE REASSESSMENT NOTE - NS ED NURSE REASSESS COMMENT FT1
Spoke with covering inpatient ICU resident re: admission lab orders. Communicated process of lab orders for admitted patients while in ED waiting bed assignment.   Patient improved since arrival to ED. Work of breathing much improved. HOB kept at 90* to promote lunch expansion. Continuous cardiac/NIBP/POX monitoring maintained.  Plan of care/wait time explained to patient.  Safety and comfort measures maintained.

## 2021-03-05 NOTE — ED ADULT NURSE NOTE - NS ED PATIENT SAFETY CONCERN
Unable to assess due to medical condition Carac Counseling:  I discussed with the patient the risks of Carac including but not limited to erythema, scaling, itching, weeping, crusting, and pain.

## 2021-03-05 NOTE — ED PROVIDER NOTE - CLINICAL SUMMARY MEDICAL DECISION MAKING FREE TEXT BOX
Pt w progressive VIGIL, jaundice, no focal abd pain, fever. Placed on NC, venti mask w improvements of sats to mid 80s to 90-91. No respiratory distress, tachypnea.   Ddx: covid, PE, malignancy, other pulm/intrabd process  - labs, CTA c/a/p, ICU consult given oxygen requirements, reassess.

## 2021-03-05 NOTE — H&P ADULT - PROBLEM SELECTOR PLAN 5
On chest CT on admission, showing bilateral bronchiectasis and diffuse subpleural reticular opacities with basilar predominance are suggestive of nonspecific interstitial lung disease (pattern favors UIP or fibrotic NSIP). On chest CT on admission, showing bilateral bronchiectasis and diffuse subpleural reticular opacities with basilar predominance are suggestive of nonspecific interstitial lung disease (pattern favors UIP or fibrotic NSIP). Pt with hx of ORLANDO (not compliant with CPAP), no other known prior pulm hx.   - Will need pulm f/u as outpatient

## 2021-03-05 NOTE — ED ADULT NURSE NOTE - CHIEF COMPLAINT QUOTE
74 y/o male came in to ED c/o progressive SOB  and jaundice for the past "Few weeks" Pt noted to to have cyanotic lips.

## 2021-03-05 NOTE — H&P ADULT - PROBLEM SELECTOR PLAN 2
Patient presenting with painless jaundice with elevated TBili.  - Likely in setting of hemolysis  - Management as above Patient presenting with painless jaundice with elevated TBili. Had been previously worked up as outpatient with concern for elevated CA 19-9.  - Likely in setting of hemolysis  - Management as above

## 2021-03-05 NOTE — CONSULT NOTE ADULT - ASSESSMENT
75M w/ PMH of afib (on coumadin), ORLANDO (noncompliant with CPAP), who presents to [facility] ED after being sent in by his surgeon Dr. Katarina Mccallum for workup of jaundice. Patient is being admitted for further management of macrocytic anemia of unknown origin with concern for hemolysis. Hematology consulted for further management.     Macrocytosis  Anemia, rule out hemolysis   - Hb 11.7, RBC 2.88, .9, high normal RDW   - Given his history of long standing ORLANDO, we would normally expect to have secondary erythrocytosis with elevated Hb and RBC count. His low counts are suggestive of hemolysis or acute blood loss.    - macrocytosis is likely related to increased reticulocytosis in the setting of hemolysis given elevated LDH and indirect bilirubin, it can also be seen with b12/folate deficiency, MDS, plasma cell disorders. Please check retic count, LDH, haptoglobin, direct and indirect alka STAT.   - Check iron studies, b12, folate, SPEP and serum immunofixation   - Spurious macrocytosis has been seen with cold agglutinins. Please check cold agglutinin titers and cryoglobulins, mycoplasma ab titers.   - Will review PBS  - CT a/p with no evidence of LAD.     Mild coagulopathy   - noted to have prolonged PT/INR related to coumadin.   - normal PTT.     Hypoxia refractory to O2 supplementation   - Co-ox to assess for methemoglobinemia showed normal methemoglobin level.   - Repeat co-oximetry pending.   - CTA negative for PE     Discussed with Dr. Vargas    75M w/ PMH of afib (on coumadin), ORLANDO (noncompliant with CPAP), who presents to [facility] ED after being sent in by his surgeon Dr. Katarina Mccallum for workup of jaundice. Patient is being admitted for further management of macrocytic anemia of unknown origin with concern for hemolysis. Hematology consulted for further management.     Macrocytosis  Anemia, rule out hemolysis   - Hb 11.7, RBC 2.88, .9, high normal RDW   - Given his history of long standing ORLANDO, we would normally expect to have secondary erythrocytosis with elevated Hb and RBC count. His low counts are suggestive of hemolysis or acute blood loss.    - macrocytosis is likely related to increased reticulocytosis in the setting of hemolysis given elevated LDH and indirect bilirubin, it can also be seen with b12/folate deficiency, MDS, plasma cell disorders. Please check retic count, LDH, haptoglobin, direct and indirect alka STAT.   - Check iron studies, b12, folate, SPEP and serum immunofixation   - Spurious macrocytosis has been seen with cold agglutinins. Please check cold agglutinin titers and cryoglobulins, mycoplasma ab titers.   - PBS with 3-4 schistocytes, rouleaux formation, polychromasia.    - CT a/p with no evidence of LAD.     Mild coagulopathy   - noted to have prolonged PT/INR related to coumadin.   - normal PTT.     Hypoxia refractory to O2 supplementation   - Co-ox to assess for methemoglobinemia showed normal methemoglobin level.   - Repeat co-oximetry pending.   - CTA negative for PE     Discussed with Dr. Vargas

## 2021-03-06 LAB
ALBUMIN SERPL ELPH-MCNC: 3.6 G/DL — SIGNIFICANT CHANGE UP (ref 3.3–5)
ALP SERPL-CCNC: 67 U/L — SIGNIFICANT CHANGE UP (ref 40–120)
ALT FLD-CCNC: 9 U/L — LOW (ref 10–45)
ANION GAP SERPL CALC-SCNC: 10 MMOL/L — SIGNIFICANT CHANGE UP (ref 5–17)
AST SERPL-CCNC: 28 U/L — SIGNIFICANT CHANGE UP (ref 10–40)
BASOPHILS # BLD AUTO: 0.02 K/UL — SIGNIFICANT CHANGE UP (ref 0–0.2)
BASOPHILS NFR BLD AUTO: 0.5 % — SIGNIFICANT CHANGE UP (ref 0–2)
BILIRUB DIRECT SERPL-MCNC: 0.8 MG/DL — HIGH (ref 0–0.2)
BILIRUB SERPL-MCNC: 3.3 MG/DL — HIGH (ref 0.2–1.2)
BLD GP AB SCN SERPL QL: NEGATIVE — SIGNIFICANT CHANGE UP
BUN SERPL-MCNC: 29 MG/DL — HIGH (ref 7–23)
CA TITR SERPL: POSITIVE — SIGNIFICANT CHANGE UP
CALCIUM SERPL-MCNC: 8.8 MG/DL — SIGNIFICANT CHANGE UP (ref 8.4–10.5)
CHLORIDE SERPL-SCNC: 105 MMOL/L — SIGNIFICANT CHANGE UP (ref 96–108)
CO2 SERPL-SCNC: 24 MMOL/L — SIGNIFICANT CHANGE UP (ref 22–31)
CREAT SERPL-MCNC: 0.96 MG/DL — SIGNIFICANT CHANGE UP (ref 0.5–1.3)
EOSINOPHIL # BLD AUTO: 0.19 K/UL — SIGNIFICANT CHANGE UP (ref 0–0.5)
EOSINOPHIL NFR BLD AUTO: 4.8 % — SIGNIFICANT CHANGE UP (ref 0–6)
GLUCOSE SERPL-MCNC: 92 MG/DL — SIGNIFICANT CHANGE UP (ref 70–99)
HCT VFR BLD CALC: 34.1 % — LOW (ref 39–50)
HCV AB S/CO SERPL IA: 0.13 S/CO — SIGNIFICANT CHANGE UP
HCV AB SERPL-IMP: SIGNIFICANT CHANGE UP
HGB BLD-MCNC: 10.7 G/DL — LOW (ref 13–17)
IMM GRANULOCYTES NFR BLD AUTO: 0.8 % — SIGNIFICANT CHANGE UP (ref 0–1.5)
LYMPHOCYTES # BLD AUTO: 1.01 K/UL — SIGNIFICANT CHANGE UP (ref 1–3.3)
LYMPHOCYTES # BLD AUTO: 25.7 % — SIGNIFICANT CHANGE UP (ref 13–44)
MAGNESIUM SERPL-MCNC: 2.2 MG/DL — SIGNIFICANT CHANGE UP (ref 1.6–2.6)
MCHC RBC-ENTMCNC: 31.4 GM/DL — LOW (ref 32–36)
MCHC RBC-ENTMCNC: 41.3 PG — HIGH (ref 27–34)
MCV RBC AUTO: 131.7 FL — HIGH (ref 80–100)
MONOCYTES # BLD AUTO: 0.45 K/UL — SIGNIFICANT CHANGE UP (ref 0–0.9)
MONOCYTES NFR BLD AUTO: 11.5 % — SIGNIFICANT CHANGE UP (ref 2–14)
NEUTROPHILS # BLD AUTO: 2.23 K/UL — SIGNIFICANT CHANGE UP (ref 1.8–7.4)
NEUTROPHILS NFR BLD AUTO: 56.7 % — SIGNIFICANT CHANGE UP (ref 43–77)
NRBC # BLD: 0 /100 WBCS — SIGNIFICANT CHANGE UP (ref 0–0)
PHOSPHATE SERPL-MCNC: 4 MG/DL — SIGNIFICANT CHANGE UP (ref 2.5–4.5)
PLATELET # BLD AUTO: 130 K/UL — LOW (ref 150–400)
POTASSIUM SERPL-MCNC: 4 MMOL/L — SIGNIFICANT CHANGE UP (ref 3.5–5.3)
POTASSIUM SERPL-SCNC: 4 MMOL/L — SIGNIFICANT CHANGE UP (ref 3.5–5.3)
PROT SERPL-MCNC: 6.6 G/DL — SIGNIFICANT CHANGE UP (ref 6–8.3)
RBC # BLD: 2.59 M/UL — LOW (ref 4.2–5.8)
RBC # FLD: 14.1 % — SIGNIFICANT CHANGE UP (ref 10.3–14.5)
RETICS #: 189.4 K/UL — HIGH (ref 25–125)
RETICS/RBC NFR: 7.3 % — HIGH (ref 0.5–2.5)
RH IG SCN BLD-IMP: NEGATIVE — SIGNIFICANT CHANGE UP
SODIUM SERPL-SCNC: 139 MMOL/L — SIGNIFICANT CHANGE UP (ref 135–145)
WBC # BLD: 3.93 K/UL — SIGNIFICANT CHANGE UP (ref 3.8–10.5)
WBC # FLD AUTO: 3.93 K/UL — SIGNIFICANT CHANGE UP (ref 3.8–10.5)

## 2021-03-06 PROCEDURE — 99233 SBSQ HOSP IP/OBS HIGH 50: CPT | Mod: GC

## 2021-03-06 RX ADMIN — Medication 5 MILLIGRAM(S): at 22:01

## 2021-03-06 RX ADMIN — ENOXAPARIN SODIUM 90 MILLIGRAM(S): 100 INJECTION SUBCUTANEOUS at 10:33

## 2021-03-06 RX ADMIN — ENOXAPARIN SODIUM 90 MILLIGRAM(S): 100 INJECTION SUBCUTANEOUS at 22:01

## 2021-03-06 NOTE — PROGRESS NOTE ADULT - ASSESSMENT
75M w/ PMH of afib (on coumadin, toprol), ORLANDO (noncompliant with CPAP), who presents to [facility] ED after being sent in by his surgeon Dr. Katarina Mccallum for workup of his painless jaundice. Patient is being admitted to [facility] for further management of macrocytic anemia of unknown origin with concern for hemolysis.

## 2021-03-06 NOTE — PROGRESS NOTE ADULT - PROBLEM SELECTOR PLAN 1
On admission, Hb 11.7 with .9 in setting of painless jaundice, concern for hemolysis. Schistocytes on smear.   - Per heme/onc, ordered cold agglutinin, alka, cryo, SPEP, immunofixation, b12, folate, hapto, LDH, smear, Hb electrophoresis  - Repeat ABG with co-oximetry   - Heme/onc consulted, recs appreciated

## 2021-03-06 NOTE — PROGRESS NOTE ADULT - SUBJECTIVE AND OBJECTIVE BOX
OVERNIGHT EVENTS: No acute events.     SUBJECTIVE / INTERVAL HPI: Patient seen and examined at bedside. States he feels well at rest, but continues to be SOB on ambulation. He denies HA, weakness, F/C, SOB, CP, palpitations, N/V, abdominal pain, diarrhea, constipation, dysuria, LE edema. ROS otherwise negative.     VITAL SIGNS:  Vital Signs Last 24 Hrs  T(C): 37.1 (06 Mar 2021 17:31), Max: 37.1 (06 Mar 2021 17:31)  T(F): 98.7 (06 Mar 2021 17:31), Max: 98.7 (06 Mar 2021 17:31)  HR: 72 (06 Mar 2021 18:16) (68 - 88)  BP: 111/58 (06 Mar 2021 18:16) (98/56 - 145/67)  BP(mean): 81 (06 Mar 2021 18:16) (71 - 94)  RR: 16 (06 Mar 2021 18:16) (15 - 20)  SpO2: 90% (06 Mar 2021 18:16) (89% - 91%)    03-05-21 @ 07:01  -  03-06-21 @ 07:00  --------------------------------------------------------  IN: 1000 mL / OUT: 800 mL / NET: 200 mL        PHYSICAL EXAM:    General: WDWN, Jaundiced, NAD  HEENT: NC/AT; PERRL, +scleral icterus; MMM  Neck: supple  Cardiovascular: +S1/S2; RRR  Respiratory: bibasilar crackles; no W/R/R on 6LNC  Gastrointestinal: soft, NT/ND; +BSx4  Extremities: WWP; no edema, clubbing or cyanosis  Vascular: 2+ radial, DP/PT pulses B/L  Neurological: AAOx3; no focal deficits      MEDICATIONS:  MEDICATIONS  (STANDING):  enoxaparin Injectable 90 milliGRAM(s) SubCutaneous every 12 hours  melatonin 5 milliGRAM(s) Oral at bedtime    MEDICATIONS  (PRN):  acetaminophen   Tablet .. 650 milliGRAM(s) Oral every 6 hours PRN Temp greater or equal to 38C (100.4F), Mild Pain (1 - 3)      ALLERGIES:  Allergies    No Known Allergies    Intolerances        LABS:                        10.7   3.93  )-----------( 130      ( 06 Mar 2021 07:19 )             34.1     03-06    139  |  105  |  29<H>  ----------------------------<  92  4.0   |  24  |  0.96    Ca    8.8      06 Mar 2021 07:19  Phos  4.0     03-06  Mg     2.2     03-06    TPro  6.6  /  Alb  3.6  /  TBili  3.3<H>  /  DBili  x   /  AST  28  /  ALT  9<L>  /  AlkPhos  67  03-06    PT/INR - ( 05 Mar 2021 20:38 )   PT: 17.4 sec;   INR: 1.48          PTT - ( 05 Mar 2021 20:38 )  PTT:29.5 sec    CAPILLARY BLOOD GLUCOSE            RADIOLOGY & ADDITIONAL TESTS: Reviewed.

## 2021-03-06 NOTE — PROGRESS NOTE ADULT - PROBLEM SELECTOR PLAN 4
Patient with hx of Afib, on coumadin and toprol as outpatient.  - Lovenox 90 BID  - Holding Toprol at this time

## 2021-03-07 LAB
ALBUMIN SERPL ELPH-MCNC: 3.7 G/DL — SIGNIFICANT CHANGE UP (ref 3.3–5)
ALP SERPL-CCNC: 70 U/L — SIGNIFICANT CHANGE UP (ref 40–120)
ALT FLD-CCNC: 11 U/L — SIGNIFICANT CHANGE UP (ref 10–45)
ANION GAP SERPL CALC-SCNC: 9 MMOL/L — SIGNIFICANT CHANGE UP (ref 5–17)
AST SERPL-CCNC: 36 U/L — SIGNIFICANT CHANGE UP (ref 10–40)
BASOPHILS # BLD AUTO: 0.02 K/UL — SIGNIFICANT CHANGE UP (ref 0–0.2)
BASOPHILS NFR BLD AUTO: 0.5 % — SIGNIFICANT CHANGE UP (ref 0–2)
BILIRUB DIRECT SERPL-MCNC: 0.6 MG/DL — HIGH (ref 0–0.2)
BILIRUB INDIRECT FLD-MCNC: 1.8 MG/DL — HIGH (ref 0.2–1)
BILIRUB SERPL-MCNC: 2.4 MG/DL — HIGH (ref 0.2–1.2)
BILIRUB SERPL-MCNC: 2.5 MG/DL — HIGH (ref 0.2–1.2)
BUN SERPL-MCNC: 22 MG/DL — SIGNIFICANT CHANGE UP (ref 7–23)
CALCIUM SERPL-MCNC: 8.6 MG/DL — SIGNIFICANT CHANGE UP (ref 8.4–10.5)
CHLORIDE SERPL-SCNC: 105 MMOL/L — SIGNIFICANT CHANGE UP (ref 96–108)
CO2 SERPL-SCNC: 24 MMOL/L — SIGNIFICANT CHANGE UP (ref 22–31)
CREAT SERPL-MCNC: 0.75 MG/DL — SIGNIFICANT CHANGE UP (ref 0.5–1.3)
CULTURE RESULTS: SIGNIFICANT CHANGE UP
EOSINOPHIL # BLD AUTO: 0.14 K/UL — SIGNIFICANT CHANGE UP (ref 0–0.5)
EOSINOPHIL NFR BLD AUTO: 3.6 % — SIGNIFICANT CHANGE UP (ref 0–6)
GLUCOSE SERPL-MCNC: 94 MG/DL — SIGNIFICANT CHANGE UP (ref 70–99)
HAPTOGLOB SERPL-MCNC: <10 MG/DL — LOW (ref 34–200)
HCT VFR BLD CALC: 33.5 % — LOW (ref 39–50)
HGB BLD-MCNC: 10.6 G/DL — LOW (ref 13–17)
IMM GRANULOCYTES NFR BLD AUTO: 0.3 % — SIGNIFICANT CHANGE UP (ref 0–1.5)
LDH SERPL L TO P-CCNC: 581 U/L — HIGH (ref 50–242)
LYMPHOCYTES # BLD AUTO: 1.12 K/UL — SIGNIFICANT CHANGE UP (ref 1–3.3)
LYMPHOCYTES # BLD AUTO: 28.4 % — SIGNIFICANT CHANGE UP (ref 13–44)
MAGNESIUM SERPL-MCNC: 2.1 MG/DL — SIGNIFICANT CHANGE UP (ref 1.6–2.6)
MCHC RBC-ENTMCNC: 31.6 GM/DL — LOW (ref 32–36)
MCHC RBC-ENTMCNC: 39.8 PG — HIGH (ref 27–34)
MCV RBC AUTO: 125.9 FL — HIGH (ref 80–100)
MONOCYTES # BLD AUTO: 0.35 K/UL — SIGNIFICANT CHANGE UP (ref 0–0.9)
MONOCYTES NFR BLD AUTO: 8.9 % — SIGNIFICANT CHANGE UP (ref 2–14)
NEUTROPHILS # BLD AUTO: 2.3 K/UL — SIGNIFICANT CHANGE UP (ref 1.8–7.4)
NEUTROPHILS NFR BLD AUTO: 58.3 % — SIGNIFICANT CHANGE UP (ref 43–77)
NRBC # BLD: 0 /100 WBCS — SIGNIFICANT CHANGE UP (ref 0–0)
PHOSPHATE SERPL-MCNC: 3.2 MG/DL — SIGNIFICANT CHANGE UP (ref 2.5–4.5)
PLATELET # BLD AUTO: 137 K/UL — LOW (ref 150–400)
POTASSIUM SERPL-MCNC: 4.5 MMOL/L — SIGNIFICANT CHANGE UP (ref 3.5–5.3)
POTASSIUM SERPL-SCNC: 4.5 MMOL/L — SIGNIFICANT CHANGE UP (ref 3.5–5.3)
PROT SERPL-MCNC: 6.6 G/DL — SIGNIFICANT CHANGE UP (ref 6–8.3)
RBC # BLD: 2.66 M/UL — LOW (ref 4.2–5.8)
RBC # FLD: 13.4 % — SIGNIFICANT CHANGE UP (ref 10.3–14.5)
SODIUM SERPL-SCNC: 138 MMOL/L — SIGNIFICANT CHANGE UP (ref 135–145)
SPECIMEN SOURCE: SIGNIFICANT CHANGE UP
WBC # BLD: 3.94 K/UL — SIGNIFICANT CHANGE UP (ref 3.8–10.5)
WBC # FLD AUTO: 3.94 K/UL — SIGNIFICANT CHANGE UP (ref 3.8–10.5)

## 2021-03-07 PROCEDURE — 99233 SBSQ HOSP IP/OBS HIGH 50: CPT | Mod: GC

## 2021-03-07 RX ORDER — SENNA PLUS 8.6 MG/1
1 TABLET ORAL AT BEDTIME
Refills: 0 | Status: DISCONTINUED | OUTPATIENT
Start: 2021-03-07 | End: 2021-03-10

## 2021-03-07 RX ORDER — FOLIC ACID 0.8 MG
3 TABLET ORAL EVERY 24 HOURS
Refills: 0 | Status: DISCONTINUED | OUTPATIENT
Start: 2021-03-07 | End: 2021-03-10

## 2021-03-07 RX ORDER — LACTULOSE 10 G/15ML
10 SOLUTION ORAL ONCE
Refills: 0 | Status: COMPLETED | OUTPATIENT
Start: 2021-03-07 | End: 2021-03-07

## 2021-03-07 RX ORDER — POLYETHYLENE GLYCOL 3350 17 G/17G
17 POWDER, FOR SOLUTION ORAL DAILY
Refills: 0 | Status: DISCONTINUED | OUTPATIENT
Start: 2021-03-07 | End: 2021-03-10

## 2021-03-07 RX ADMIN — LACTULOSE 10 GRAM(S): 10 SOLUTION ORAL at 19:41

## 2021-03-07 RX ADMIN — ENOXAPARIN SODIUM 90 MILLIGRAM(S): 100 INJECTION SUBCUTANEOUS at 22:01

## 2021-03-07 RX ADMIN — ENOXAPARIN SODIUM 90 MILLIGRAM(S): 100 INJECTION SUBCUTANEOUS at 11:17

## 2021-03-07 RX ADMIN — Medication 5 MILLIGRAM(S): at 22:01

## 2021-03-07 RX ADMIN — Medication 3 MILLIGRAM(S): at 18:54

## 2021-03-07 RX ADMIN — POLYETHYLENE GLYCOL 3350 17 GRAM(S): 17 POWDER, FOR SOLUTION ORAL at 11:17

## 2021-03-07 RX ADMIN — SENNA PLUS 1 TABLET(S): 8.6 TABLET ORAL at 22:01

## 2021-03-07 NOTE — PROGRESS NOTE ADULT - ASSESSMENT
75M w/ PMH of afib (on coumadin, toprol), ORLANDO (noncompliant with CPAP), who presents to [facility] ED after being sent in by his surgeon Dr. Katarina Mccallum for workup of his painless jaundice. Patient is being admitted to [facility] for further management of macrocytic anemia of unknown origin with concern for hemolysis.

## 2021-03-07 NOTE — PROGRESS NOTE ADULT - SUBJECTIVE AND OBJECTIVE BOX
**Incomplete Note**    INTERVAL HPI/OVERNIGHT EVENTS:    SUBJECTIVE:   Patient seen and examined at bedside.    OBJECTIVE:    VITAL SIGNS:  ICU Vital Signs Last 24 Hrs  T(C): 36.4 (07 Mar 2021 06:00), Max: 37.1 (06 Mar 2021 17:31)  T(F): 97.5 (07 Mar 2021 06:00), Max: 98.7 (06 Mar 2021 17:31)  HR: 64 (07 Mar 2021 03:52) (64 - 86)  BP: 95/54 (07 Mar 2021 03:52) (95/54 - 121/60)  BP(mean): 72 (07 Mar 2021 03:52) (71 - 84)  ABP: --  ABP(mean): --  RR: 18 (07 Mar 2021 03:52) (15 - 18)  SpO2: 91% (07 Mar 2021 03:52) (90% - 91%)        03-05 @ 07:01  -  03-06 @ 07:00  --------------------------------------------------------  IN: 1000 mL / OUT: 800 mL / NET: 200 mL      CAPILLARY BLOOD GLUCOSE          PHYSICAL EXAM:    General: NAD; Lying comfortably in bed  HEENT: NC/AT; PERRL, clear conjunctiva; MMM  Neck: Supple. No JVD or thyromegaly   Respiratory: CTA b/l. No wheezes, rhonchi, rales.  Cardiovascular: +S1/S2; RRR; No murmurs, rubs, gallops.  Abdomen: soft, NT/ND; +BS x4  Extremities: WWP, 2+ peripheral pulses b/l; no LE edema  Skin: normal color and turgor; no rash  Neurological: AOx3    MEDICATIONS:  MEDICATIONS  (STANDING):  enoxaparin Injectable 90 milliGRAM(s) SubCutaneous every 12 hours  melatonin 5 milliGRAM(s) Oral at bedtime  polyethylene glycol 3350 17 Gram(s) Oral daily  senna 1 Tablet(s) Oral at bedtime    MEDICATIONS  (PRN):  acetaminophen   Tablet .. 650 milliGRAM(s) Oral every 6 hours PRN Temp greater or equal to 38C (100.4F), Mild Pain (1 - 3)      ALLERGIES:  Allergies    No Known Allergies    Intolerances        LABS:                        10.7   3.93  )-----------( 130      ( 06 Mar 2021 07:19 )             34.1     03-06    139  |  105  |  29<H>  ----------------------------<  92  4.0   |  24  |  0.96    Ca    8.8      06 Mar 2021 07:19  Phos  4.0     03-06  Mg     2.2     03-06    TPro  6.6  /  Alb  3.6  /  TBili  3.3<H>  /  DBili  0.8<H>  /  AST  28  /  ALT  9<L>  /  AlkPhos  67  03-06    PT/INR - ( 05 Mar 2021 20:38 )   PT: 17.4 sec;   INR: 1.48          PTT - ( 05 Mar 2021 20:38 )  PTT:29.5 sec      RADIOLOGY & ADDITIONAL TESTS: Reviewed. OBJECTIVE:    VITAL SIGNS:  ICU Vital Signs Last 24 Hrs  T(C): 36.4 (07 Mar 2021 06:00), Max: 37.1 (06 Mar 2021 17:31)  T(F): 97.5 (07 Mar 2021 06:00), Max: 98.7 (06 Mar 2021 17:31)  HR: 64 (07 Mar 2021 03:52) (64 - 86)  BP: 95/54 (07 Mar 2021 03:52) (95/54 - 121/60)  BP(mean): 72 (07 Mar 2021 03:52) (71 - 84)  ABP: --  ABP(mean): --  RR: 18 (07 Mar 2021 03:52) (15 - 18)  SpO2: 91% (07 Mar 2021 03:52) (90% - 91%)        03-05 @ 07:01  -  03-06 @ 07:00  --------------------------------------------------------  IN: 1000 mL / OUT: 800 mL / NET: 200 mL      CAPILLARY BLOOD GLUCOSE          PHYSICAL EXAM:    General: NAD; Lying comfortably in bed  HEENT: NC/AT; PERRL, clear conjunctiva; MMM  Neck: Supple. No JVD or thyromegaly   Respiratory: CTA b/l. No wheezes, rhonchi, rales.  Cardiovascular: +S1/S2; RRR; No murmurs, rubs, gallops.  Abdomen: soft, NT/ND; +BS x4  Extremities: WWP, 2+ peripheral pulses b/l; no LE edema  Skin: normal color and turgor; no rash  Neurological: AOx3    MEDICATIONS:  MEDICATIONS  (STANDING):  enoxaparin Injectable 90 milliGRAM(s) SubCutaneous every 12 hours  melatonin 5 milliGRAM(s) Oral at bedtime  polyethylene glycol 3350 17 Gram(s) Oral daily  senna 1 Tablet(s) Oral at bedtime    MEDICATIONS  (PRN):  acetaminophen   Tablet .. 650 milliGRAM(s) Oral every 6 hours PRN Temp greater or equal to 38C (100.4F), Mild Pain (1 - 3)      ALLERGIES:  Allergies    No Known Allergies    Intolerances        LABS:                        10.7   3.93  )-----------( 130      ( 06 Mar 2021 07:19 )             34.1     03-06    139  |  105  |  29<H>  ----------------------------<  92  4.0   |  24  |  0.96    Ca    8.8      06 Mar 2021 07:19  Phos  4.0     03-06  Mg     2.2     03-06    TPro  6.6  /  Alb  3.6  /  TBili  3.3<H>  /  DBili  0.8<H>  /  AST  28  /  ALT  9<L>  /  AlkPhos  67  03-06    PT/INR - ( 05 Mar 2021 20:38 )   PT: 17.4 sec;   INR: 1.48          PTT - ( 05 Mar 2021 20:38 )  PTT:29.5 sec      RADIOLOGY & ADDITIONAL TESTS: Reviewed. OBJECTIVE:    VITAL SIGNS:  ICU Vital Signs Last 24 Hrs  T(C): 36.4 (07 Mar 2021 06:00), Max: 37.1 (06 Mar 2021 17:31)  T(F): 97.5 (07 Mar 2021 06:00), Max: 98.7 (06 Mar 2021 17:31)  HR: 64 (07 Mar 2021 03:52) (64 - 86)  BP: 95/54 (07 Mar 2021 03:52) (95/54 - 121/60)  BP(mean): 72 (07 Mar 2021 03:52) (71 - 84)  ABP: --  ABP(mean): --  RR: 18 (07 Mar 2021 03:52) (15 - 18)  SpO2: 91% (07 Mar 2021 03:52) (90% - 91%)        03-05 @ 07:01  -  03-06 @ 07:00  --------------------------------------------------------  IN: 1000 mL / OUT: 800 mL / NET: 200 mL      CAPILLARY BLOOD GLUCOSE          PHYSICAL EXAM:      General: WDWN, Jaundiced, NAD  HEENT: NC/AT; PERRL, +scleral icterus; MMM  Neck: supple  Cardiovascular: +S1/S2; RRR  Respiratory: bibasilar crackles; no W/R/R on 6LNC  Gastrointestinal: soft, NT/ND; +BSx4  Extremities: WWP; no edema, clubbing or cyanosis  Vascular: 2+ radial, DP/PT pulses B/L  Neurological: AAOx3; no focal deficits    MEDICATIONS:  MEDICATIONS  (STANDING):  enoxaparin Injectable 90 milliGRAM(s) SubCutaneous every 12 hours  melatonin 5 milliGRAM(s) Oral at bedtime  polyethylene glycol 3350 17 Gram(s) Oral daily  senna 1 Tablet(s) Oral at bedtime    MEDICATIONS  (PRN):  acetaminophen   Tablet .. 650 milliGRAM(s) Oral every 6 hours PRN Temp greater or equal to 38C (100.4F), Mild Pain (1 - 3)      ALLERGIES:  Allergies    No Known Allergies    Intolerances        LABS:                        10.7   3.93  )-----------( 130      ( 06 Mar 2021 07:19 )             34.1     03-06    139  |  105  |  29<H>  ----------------------------<  92  4.0   |  24  |  0.96    Ca    8.8      06 Mar 2021 07:19  Phos  4.0     03-06  Mg     2.2     03-06    TPro  6.6  /  Alb  3.6  /  TBili  3.3<H>  /  DBili  0.8<H>  /  AST  28  /  ALT  9<L>  /  AlkPhos  67  03-06    PT/INR - ( 05 Mar 2021 20:38 )   PT: 17.4 sec;   INR: 1.48          PTT - ( 05 Mar 2021 20:38 )  PTT:29.5 sec      RADIOLOGY & ADDITIONAL TESTS: Reviewed.

## 2021-03-08 LAB
% ALBUMIN: 56.5 % — SIGNIFICANT CHANGE UP
% ALPHA 1: 4.2 % — SIGNIFICANT CHANGE UP
% ALPHA 2: 10 % — SIGNIFICANT CHANGE UP
% BETA: 9.3 % — SIGNIFICANT CHANGE UP
% GAMMA: 20 % — SIGNIFICANT CHANGE UP
ALBUMIN SERPL ELPH-MCNC: 3.7 G/DL — SIGNIFICANT CHANGE UP (ref 3.6–5.5)
ALBUMIN SERPL ELPH-MCNC: 3.8 G/DL — SIGNIFICANT CHANGE UP (ref 3.3–5)
ALBUMIN/GLOB SERPL ELPH: 1.3 RATIO — SIGNIFICANT CHANGE UP
ALP SERPL-CCNC: 67 U/L — SIGNIFICANT CHANGE UP (ref 40–120)
ALPHA1 GLOB SERPL ELPH-MCNC: 0.3 G/DL — SIGNIFICANT CHANGE UP (ref 0.1–0.4)
ALPHA2 GLOB SERPL ELPH-MCNC: 0.7 G/DL — SIGNIFICANT CHANGE UP (ref 0.5–1)
ALT FLD-CCNC: 13 U/L — SIGNIFICANT CHANGE UP (ref 10–45)
ANION GAP SERPL CALC-SCNC: 6 MMOL/L — SIGNIFICANT CHANGE UP (ref 5–17)
AST SERPL-CCNC: 47 U/L — HIGH (ref 10–40)
B-GLOBULIN SERPL ELPH-MCNC: 0.6 G/DL — SIGNIFICANT CHANGE UP (ref 0.5–1)
BASOPHILS # BLD AUTO: 0.02 K/UL — SIGNIFICANT CHANGE UP (ref 0–0.2)
BASOPHILS NFR BLD AUTO: 0.5 % — SIGNIFICANT CHANGE UP (ref 0–2)
BILIRUB DIRECT SERPL-MCNC: 0.6 MG/DL — HIGH (ref 0–0.2)
BILIRUB INDIRECT FLD-MCNC: 2 MG/DL — HIGH (ref 0.2–1)
BILIRUB SERPL-MCNC: 2.7 MG/DL — HIGH (ref 0.2–1.2)
BILIRUB SERPL-MCNC: 2.7 MG/DL — HIGH (ref 0.2–1.2)
BUN SERPL-MCNC: 20 MG/DL — SIGNIFICANT CHANGE UP (ref 7–23)
CALCIUM SERPL-MCNC: 8.8 MG/DL — SIGNIFICANT CHANGE UP (ref 8.4–10.5)
CHLORIDE SERPL-SCNC: 103 MMOL/L — SIGNIFICANT CHANGE UP (ref 96–108)
CO2 SERPL-SCNC: 27 MMOL/L — SIGNIFICANT CHANGE UP (ref 22–31)
CREAT SERPL-MCNC: 0.82 MG/DL — SIGNIFICANT CHANGE UP (ref 0.5–1.3)
EOSINOPHIL # BLD AUTO: 0.2 K/UL — SIGNIFICANT CHANGE UP (ref 0–0.5)
EOSINOPHIL NFR BLD AUTO: 4.9 % — SIGNIFICANT CHANGE UP (ref 0–6)
FOLATE SERPL-MCNC: 19.1 NG/ML — SIGNIFICANT CHANGE UP
GAMMA GLOBULIN: 1.3 G/DL — SIGNIFICANT CHANGE UP (ref 0.6–1.6)
GLUCOSE SERPL-MCNC: 92 MG/DL — SIGNIFICANT CHANGE UP (ref 70–99)
HAPTOGLOB SERPL-MCNC: <10 MG/DL — LOW (ref 34–200)
HCT VFR BLD CALC: 33.3 % — LOW (ref 39–50)
HEMOGLOBIN INTERPRETATION: SIGNIFICANT CHANGE UP
HGB A MFR BLD: 97 % — SIGNIFICANT CHANGE UP (ref 95.8–98)
HGB A2 MFR BLD: 2.4 % — SIGNIFICANT CHANGE UP (ref 2–3.2)
HGB BLD-MCNC: 10.6 G/DL — LOW (ref 13–17)
HGB F MFR BLD: 0.6 % — SIGNIFICANT CHANGE UP (ref 0–1)
IMM GRANULOCYTES NFR BLD AUTO: 0.2 % — SIGNIFICANT CHANGE UP (ref 0–1.5)
INTERPRETATION SERPL IFE-IMP: SIGNIFICANT CHANGE UP
LDH SERPL L TO P-CCNC: 515 U/L — HIGH (ref 50–242)
LYMPHOCYTES # BLD AUTO: 1.12 K/UL — SIGNIFICANT CHANGE UP (ref 1–3.3)
LYMPHOCYTES # BLD AUTO: 27.3 % — SIGNIFICANT CHANGE UP (ref 13–44)
M PNEUMO IGG SER IA-ACNC: 3.18 INDEX — HIGH (ref 0–0.9)
M PNEUMO IGG SER IA-ACNC: POSITIVE
M PNEUMO IGM SER-ACNC: 0.26 INDEX — SIGNIFICANT CHANGE UP (ref 0–0.9)
MAGNESIUM SERPL-MCNC: 2.2 MG/DL — SIGNIFICANT CHANGE UP (ref 1.6–2.6)
MCHC RBC-ENTMCNC: 31.8 GM/DL — LOW (ref 32–36)
MCHC RBC-ENTMCNC: 39.3 PG — HIGH (ref 27–34)
MCV RBC AUTO: 123.3 FL — HIGH (ref 80–100)
MONOCYTES # BLD AUTO: 0.31 K/UL — SIGNIFICANT CHANGE UP (ref 0–0.9)
MONOCYTES NFR BLD AUTO: 7.5 % — SIGNIFICANT CHANGE UP (ref 2–14)
MYCOPLASMA AG SPEC QL: NEGATIVE — SIGNIFICANT CHANGE UP
NEUTROPHILS # BLD AUTO: 2.45 K/UL — SIGNIFICANT CHANGE UP (ref 1.8–7.4)
NEUTROPHILS NFR BLD AUTO: 59.6 % — SIGNIFICANT CHANGE UP (ref 43–77)
NRBC # BLD: 0 /100 WBCS — SIGNIFICANT CHANGE UP (ref 0–0)
PHOSPHATE SERPL-MCNC: 3.2 MG/DL — SIGNIFICANT CHANGE UP (ref 2.5–4.5)
PLATELET # BLD AUTO: 137 K/UL — LOW (ref 150–400)
POTASSIUM SERPL-MCNC: 4.5 MMOL/L — SIGNIFICANT CHANGE UP (ref 3.5–5.3)
POTASSIUM SERPL-SCNC: 4.5 MMOL/L — SIGNIFICANT CHANGE UP (ref 3.5–5.3)
PROT PATTERN SERPL ELPH-IMP: SIGNIFICANT CHANGE UP
PROT SERPL-MCNC: 6.6 G/DL — SIGNIFICANT CHANGE UP (ref 6–8.3)
PROT SERPL-MCNC: 7 G/DL — SIGNIFICANT CHANGE UP (ref 6–8.3)
RBC # BLD: 2.7 M/UL — LOW (ref 4.2–5.8)
RBC # FLD: 13.2 % — SIGNIFICANT CHANGE UP (ref 10.3–14.5)
SODIUM SERPL-SCNC: 136 MMOL/L — SIGNIFICANT CHANGE UP (ref 135–145)
VIT B12 SERPL-MCNC: 683 PG/ML — SIGNIFICANT CHANGE UP (ref 232–1245)
WBC # BLD: 4.11 K/UL — SIGNIFICANT CHANGE UP (ref 3.8–10.5)
WBC # FLD AUTO: 4.11 K/UL — SIGNIFICANT CHANGE UP (ref 3.8–10.5)

## 2021-03-08 PROCEDURE — 99233 SBSQ HOSP IP/OBS HIGH 50: CPT | Mod: GC

## 2021-03-08 RX ORDER — METOPROLOL TARTRATE 50 MG
12.5 TABLET ORAL
Refills: 0 | Status: DISCONTINUED | OUTPATIENT
Start: 2021-03-08 | End: 2021-03-10

## 2021-03-08 RX ADMIN — SENNA PLUS 1 TABLET(S): 8.6 TABLET ORAL at 22:34

## 2021-03-08 RX ADMIN — POLYETHYLENE GLYCOL 3350 17 GRAM(S): 17 POWDER, FOR SOLUTION ORAL at 11:36

## 2021-03-08 RX ADMIN — Medication 12.5 MILLIGRAM(S): at 18:27

## 2021-03-08 RX ADMIN — Medication 5 MILLIGRAM(S): at 22:34

## 2021-03-08 RX ADMIN — Medication 3 MILLIGRAM(S): at 18:27

## 2021-03-08 RX ADMIN — ENOXAPARIN SODIUM 90 MILLIGRAM(S): 100 INJECTION SUBCUTANEOUS at 22:43

## 2021-03-08 RX ADMIN — ENOXAPARIN SODIUM 90 MILLIGRAM(S): 100 INJECTION SUBCUTANEOUS at 10:41

## 2021-03-08 NOTE — PROGRESS NOTE ADULT - PROBLEM SELECTOR PLAN 4
Patient with hx of Afib, on coumadin and toprol as outpatient.  - Lovenox 90 BID on admission -> transition to Eliquis today  - Holding Toprol at this time Patient with hx of Afib, on coumadin and toprol as outpatient.  - Lovenox 90 BID on admission -> transition to Eliquis today  - Restarting metoprolol tartrate 12.5 BID with hold parameters. Patient with hx of Afib, on coumadin and toprol as outpatient.  - Lovenox 90 BID on admission -> can consider transition to Eliquis today  - Restarting metoprolol tartrate 12.5 BID with hold parameters.

## 2021-03-08 NOTE — PROGRESS NOTE ADULT - ASSESSMENT
76 yo M with PMHx Afib (Coumadin/Toprol), ORLANDO (noncompliant w/ CPAP) presenting to [facility] ED after being sent in from his surgeon (Dr. Katarina Mccallum) for workup of painless jaundice in the setting of macrocytic anemia and hypoxia, concerning for possible underlying hemoglobinopathy. Patient is being admitted to [facility] for further management of macrocytic anemia of unknown origin with concern for hemolysis and underlying hemoglobinopathy.

## 2021-03-08 NOTE — PROGRESS NOTE ADULT - ASSESSMENT
74 yo M with PMHx Afib (Coumadin/Toprol), ORLANDO (noncompliant w/ CPAP) presenting to [facility] ED after being sent in from his surgeon (Dr. Katarina Mccallum) for workup of painless jaundice in the setting of macrocytic anemia and hypoxia, concerning for possible underlying hemoglobinopathy. Pt now being stepped down for further management of macrocytic anemia of unknown origin with concern for hemolysis and underlying hemoglobinopathy.

## 2021-03-08 NOTE — PROGRESS NOTE ADULT - SUBJECTIVE AND OBJECTIVE BOX
**Incomplete Note**    INTERVAL HPI/OVERNIGHT EVENTS:    SUBJECTIVE:   Patient seen and examined at bedside.    OBJECTIVE:    VITAL SIGNS:  ICU Vital Signs Last 24 Hrs  T(C): 36.8 (08 Mar 2021 06:00), Max: 36.8 (08 Mar 2021 06:00)  T(F): 98.2 (08 Mar 2021 06:00), Max: 98.2 (08 Mar 2021 06:00)  HR: 60 (08 Mar 2021 04:18) (60 - 76)  BP: 120/64 (08 Mar 2021 04:18) (102/61 - 122/54)  BP(mean): 85 (08 Mar 2021 04:18) (76 - 85)  RR: 18 (08 Mar 2021 04:18) (18 - 20)  SpO2: 92% (08 Mar 2021 04:18) (88% - 92%)        03-07 @ 07:01  -  03-08 @ 07:00  --------------------------------------------------------  IN: 240 mL / OUT: 2200 mL / NET: -1960 mL      CAPILLARY BLOOD GLUCOSE          PHYSICAL EXAM:    General: NAD; Lying comfortably in bed  HEENT: NC/AT; PERRL, clear conjunctiva; MMM  Neck: Supple. No JVD or thyromegaly   Respiratory: CTA b/l. No wheezes, rhonchi, rales.  Cardiovascular: +S1/S2; RRR; No murmurs, rubs, gallops.  Abdomen: soft, NT/ND; +BS x4  Extremities: WWP, 2+ peripheral pulses b/l; no LE edema  Skin: normal color and turgor; no rash  Neurological: AOx3    MEDICATIONS:  MEDICATIONS  (STANDING):  enoxaparin Injectable 90 milliGRAM(s) SubCutaneous every 12 hours  folic acid 3 milliGRAM(s) Oral every 24 hours  melatonin 5 milliGRAM(s) Oral at bedtime  polyethylene glycol 3350 17 Gram(s) Oral daily  senna 1 Tablet(s) Oral at bedtime    MEDICATIONS  (PRN):  acetaminophen   Tablet .. 650 milliGRAM(s) Oral every 6 hours PRN Temp greater or equal to 38C (100.4F), Mild Pain (1 - 3)      ALLERGIES:  Allergies    No Known Allergies    Intolerances        LABS:                        10.6   4.11  )-----------( 137      ( 08 Mar 2021 06:24 )             33.3     03-07    138  |  105  |  22  ----------------------------<  94  4.5   |  24  |  0.75    Ca    8.6      07 Mar 2021 06:49  Phos  3.2     03-07  Mg     2.1     03-07    TPro  6.6  /  Alb  3.7  /  TBili  2.5<H>  /  DBili  0.6<H>  /  AST  36  /  ALT  11  /  AlkPhos  70  03-07          RADIOLOGY & ADDITIONAL TESTS: Reviewed. **Incomplete Note**    ** TRANSFER FROM [facility] TO [facility] **    HOSPITAL COURSE:    INTERVAL HPI/OVERNIGHT EVENTS:    SUBJECTIVE:   Patient seen and examined at bedside.    OBJECTIVE:    VITAL SIGNS:  ICU Vital Signs Last 24 Hrs  T(C): 36.8 (08 Mar 2021 06:00), Max: 36.8 (08 Mar 2021 06:00)  T(F): 98.2 (08 Mar 2021 06:00), Max: 98.2 (08 Mar 2021 06:00)  HR: 60 (08 Mar 2021 04:18) (60 - 76)  BP: 120/64 (08 Mar 2021 04:18) (102/61 - 122/54)  BP(mean): 85 (08 Mar 2021 04:18) (76 - 85)  RR: 18 (08 Mar 2021 04:18) (18 - 20)  SpO2: 92% (08 Mar 2021 04:18) (88% - 92%)        03-07 @ 07:01  -  03-08 @ 07:00  --------------------------------------------------------  IN: 240 mL / OUT: 2200 mL / NET: -1960 mL      CAPILLARY BLOOD GLUCOSE          PHYSICAL EXAM:    General: NAD; Lying comfortably in bed  HEENT: NC/AT; PERRL, clear conjunctiva; MMM  Neck: Supple. No JVD or thyromegaly   Respiratory: CTA b/l. No wheezes, rhonchi, rales.  Cardiovascular: +S1/S2; RRR; No murmurs, rubs, gallops.  Abdomen: soft, NT/ND; +BS x4  Extremities: WWP, 2+ peripheral pulses b/l; no LE edema  Skin: normal color and turgor; no rash  Neurological: AOx3    MEDICATIONS:  MEDICATIONS  (STANDING):  enoxaparin Injectable 90 milliGRAM(s) SubCutaneous every 12 hours  folic acid 3 milliGRAM(s) Oral every 24 hours  melatonin 5 milliGRAM(s) Oral at bedtime  polyethylene glycol 3350 17 Gram(s) Oral daily  senna 1 Tablet(s) Oral at bedtime    MEDICATIONS  (PRN):  acetaminophen   Tablet .. 650 milliGRAM(s) Oral every 6 hours PRN Temp greater or equal to 38C (100.4F), Mild Pain (1 - 3)      ALLERGIES:  Allergies    No Known Allergies    Intolerances        LABS:                        10.6   4.11  )-----------( 137      ( 08 Mar 2021 06:24 )             33.3     03-07    138  |  105  |  22  ----------------------------<  94  4.5   |  24  |  0.75    Ca    8.6      07 Mar 2021 06:49  Phos  3.2     03-07  Mg     2.1     03-07    TPro  6.6  /  Alb  3.7  /  TBili  2.5<H>  /  DBili  0.6<H>  /  AST  36  /  ALT  11  /  AlkPhos  70  03-07          RADIOLOGY & ADDITIONAL TESTS: Reviewed. ** TRANSFER FROM [facility] TO [facility] **    HOSPITAL COURSE:  74 yo M with PMHx Afib (Coumadin/Toprol), ORLANDO (noncompliant w/ CPAP) presenting to [facility] ED after being sent in from his surgeon (Dr. Katarina Mccallum) for workup of painless jaundice in the setting of macrocytic anemia and hypoxia, concerning for possible underlying hemoglobinopathy. Patient admitted to [facility] for further management of macrocytic anemia of unknown origin with concern for hemolysis and underlying hemoglobinopathy. While on [facility], patient remained stable on 6L NC with heme/onc following for work-up. Studies sent (B12, folate, mycoplasma Ab, cryoglobulin, Hb/protein electrophoresis, immunofixation, EBV titers, flow cytometry) pending results. Patient otherwise HD stable with no active issues and being followed by heme/onc for work-up and management.     INTERVAL HPI/OVERNIGHT EVENTS:  No acute overnight events.    SUBJECTIVE:   Patient seen and examined at bedside. Feels well overall. Endorses some SOB with ambulation to the rest room. Otherwise denies fevers, chills, chest pain, abd pain, nausea/vomiting, diarrhea. States that jaundice is appearing to improve.     OBJECTIVE:    VITAL SIGNS:  ICU Vital Signs Last 24 Hrs  T(C): 36.8 (08 Mar 2021 06:00), Max: 36.8 (08 Mar 2021 06:00)  T(F): 98.2 (08 Mar 2021 06:00), Max: 98.2 (08 Mar 2021 06:00)  HR: 60 (08 Mar 2021 04:18) (60 - 76)  BP: 120/64 (08 Mar 2021 04:18) (102/61 - 122/54)  BP(mean): 85 (08 Mar 2021 04:18) (76 - 85)  RR: 18 (08 Mar 2021 04:18) (18 - 20)  SpO2: 92% (08 Mar 2021 04:18) (88% - 92%)        03-07 @ 07:01  -  03-08 @ 07:00  --------------------------------------------------------  IN: 240 mL / OUT: 2200 mL / NET: -1960 mL      CAPILLARY BLOOD GLUCOSE          PHYSICAL EXAM:    General: NAD; Lying comfortably in bed. Slightly jaundiced.   HEENT: NC/AT; PERRL, clear conjunctiva; MMM  Neck: Supple. No JVD or thyromegaly   Respiratory: CTA b/l. No wheezes, rhonchi, rales. On 6L NC.  Cardiovascular: +S1/S2; RRR; No murmurs, rubs, gallops.  Abdomen: soft, NT/ND; +BS x4  Extremities: WWP, 2+ peripheral pulses b/l; no LE edema  Neurological: AOx3    MEDICATIONS:  MEDICATIONS  (STANDING):  enoxaparin Injectable 90 milliGRAM(s) SubCutaneous every 12 hours  folic acid 3 milliGRAM(s) Oral every 24 hours  melatonin 5 milliGRAM(s) Oral at bedtime  polyethylene glycol 3350 17 Gram(s) Oral daily  senna 1 Tablet(s) Oral at bedtime    MEDICATIONS  (PRN):  acetaminophen   Tablet .. 650 milliGRAM(s) Oral every 6 hours PRN Temp greater or equal to 38C (100.4F), Mild Pain (1 - 3)      ALLERGIES:  Allergies    No Known Allergies    Intolerances        LABS:                        10.6   4.11  )-----------( 137      ( 08 Mar 2021 06:24 )             33.3     03-07    138  |  105  |  22  ----------------------------<  94  4.5   |  24  |  0.75    Ca    8.6      07 Mar 2021 06:49  Phos  3.2     03-07  Mg     2.1     03-07    TPro  6.6  /  Alb  3.7  /  TBili  2.5<H>  /  DBili  0.6<H>  /  AST  36  /  ALT  11  /  AlkPhos  70  03-07          RADIOLOGY & ADDITIONAL TESTS: Reviewed.

## 2021-03-08 NOTE — PROGRESS NOTE ADULT - SUBJECTIVE AND OBJECTIVE BOX
Hematology Oncology Progress Note (Dr. Vargas )  Discussed with Dr. Vargas and recommendations reviewed with the primary team.    SUBJECTIVE: Patient reports sob and palpitations on exertion. Requiring 6L via NC.     Interval HPI: Mr. Cooney is a 75 year old male with PMH of Afib on coumadin and ORLANDO (noncompliant with CPAP) sent in by his surgeon Dr. Katarina Mccallum for workup of his painless jaundice. Patient reports generalized weakness, worsening VIGIL and fatigue over the past month, getting worse in the last 2 weeks. He was seen by his PCP 2 weeks ago and was noted to have jaundice and was referred to Dr. Mccallum for further evaluation, which showed elevated CA 19-9. Of note, he reports a prior hospitalization at [facility] for ?aortic valve infection.     ED Course:   Vitals: T 97.3, HR 95, /71, RR 24, O2sat 88% on room air. placed on 3L nc and ventimask with improvement to 93-95%.  Labs: Hb 11.7, .9 | INR 1.42 | Tbili 4.8, AST 53, ALT wnl, alk phos wnl | lactate 2.5 | d-dimer 930  EKG with A.fib with normal HR.   CTA with no evidence of PE, bilateral bronchiectasis and diffuse subpleural reticular opacities with basilar predominance are suggestive of nonspecific interstitial lung disease (pattern favors UIP or fibrotic NSIP), Diffuse parenchymal mosaic attenuation most likely air trapping.  Given 500cc NS. ICU consulted for hypoxia. ABG obtained which showed discordance between pO2 and [id number] (ABG: pH 7.41, pCO2 34, pO2 227, HCO3 21, O2sat 91%). Negative for methemoglobinemia. Concern for hemoglobinopathy, limiting oxygenation.    Hematology consulted for further evaluation of macrocytic anemia. Patient seen and examined. Reports difficult breathing and palpitations. Denies any chest pain, headache, confusion, blurry vision, abdominal pain, n/v/d, leg pain or swelling. He denies any prior episode of jaundice in the past. He denies any recent antibiotic use or new medications.     PAST MEDICAL & SURGICAL HISTORY:  Sleep apnea, non-compliant with BiPaP  Afib  No significant past surgical history    Allergies: NKDA    Social History: Former smoker, 15 cig/day x 5 years   5 shots of whiskey, 2x per week. Denies any ilicit drug use.     MEDICATIONS  (STANDING):  enoxaparin Injectable 90 milliGRAM(s) SubCutaneous every 12 hours  folic acid 3 milliGRAM(s) Oral every 24 hours  melatonin 5 milliGRAM(s) Oral at bedtime  polyethylene glycol 3350 17 Gram(s) Oral daily  senna 1 Tablet(s) Oral at bedtime    MEDICATIONS  (PRN):  acetaminophen   Tablet .. 650 milliGRAM(s) Oral every 6 hours PRN Temp greater or equal to 38C (100.4F), Mild Pain (1 - 3)    PHYSICAL EXAM:    Vital Signs Last 24 Hrs  T(C): 36.4 (08 Mar 2021 09:01), Max: 36.8 (08 Mar 2021 06:00)  T(F): 97.6 (08 Mar 2021 09:01), Max: 98.2 (08 Mar 2021 06:00)  HR: 75 (08 Mar 2021 09:00) (60 - 76)  BP: 117/56 (08 Mar 2021 09:00) (102/61 - 122/54)  BP(mean): 85 (08 Mar 2021 04:18) (76 - 85)  RR: 20 (08 Mar 2021 09:00) (18 - 20)  SpO2: 92% (08 Mar 2021 11:03) (88% - 95%)    Gen: lying in bed, jaundiced, in no acute distress  HEENT: normocephalic/atraumatic, no conjunctival pallor, + scleral icterus, no oral thrush/mucosal bleeding/mucositis  Neck: supple, no masses  Cardiovascular: irregular, nl S1S2, no murmurs  Respiratory: on NC 6L, sat 85-95%, CTAB  Gastrointestinal: BS+, soft, NT/ND, no masses, no splenomegaly, no hepatomegaly, no evidence for ascites  Extremities: no edema, no calf tenderness  Neurological: AAOx3, no focal deficits  Skin: jaundice   Lymph Nodes: no cervical/supraclavicular LAD  Musculoskeletal:  full ROM  Psychiatric:  mood stable      Labs:                        10.6   4.11  )-----------( 137      ( 08 Mar 2021 06:24 )             33.3     03-08    136  |  103  |  20  ----------------------------<  92  4.5   |  27  |  0.82    Ca    8.8      08 Mar 2021 06:24  Phos  3.2     03-08  Mg     2.2     03-08    TPro  7.0  /  Alb  3.8  /  TBili  2.7<H>  /  DBili  0.6<H>  /  AST  47<H>  /  ALT  13  /  AlkPhos  67  03-08    Bilirubin - Total and Direct in AM (03.08.21 @ 06:24)   Indirect Reacting Bilirubin: 2.0 mg/dL   Bilirubin Direct, Serum: 0.6 mg/dL   Bilirubin Total, Serum: 2.7 mg/dL   Lactate Dehydrogenase, Serum in AM (03.08.21 @ 06:24)   Lactate Dehydrogenase, Serum: 515 U/L   Haptoglobin, Serum in AM (03.08.21 @ 06:24)   Haptoglobin, Serum: <10 mg/dL   Direct Bruno Profile (03.06.21 @ 15:37)   Direct Bruno Poly: Negative   Reticulocyte Count (03.06.21 @ 07:19)   Reticulocyte Percent: 7.3 %   Absolute Reticulocytes: 189.4 K/uL   Cold Agglutinin Titer, Serum (03.05.21 @ 21:23)   Cold Agglutinin Titer, Serum: Positive: patient has a cold agglutinin titer of 4 Iron with Total Binding Capacity (03.05.21 @ 20:38)   Iron - Total Binding Capacity.: 331 ug/dL   % Saturation, Iron: 33 %   Iron Total, Serum: 109 ug/dL   Unsaturated Iron Binding Capacity: 222 ug/dL   Ferritin, Serum (03.05.21 @ 20:38)   Ferritin, Serum: 186 ng/mL     Imaging Studies:    < from: CT Chest, Abdomen and Pelvis w/ IV Cont (03.05.21 @ 13:51) >  IMPRESSION:    1.  No pulmonary embolism.  2.  Bilateral bronchiectasis and diffuse subpleural reticular opacities with basilar predominance are suggestive of nonspecific interstitial lung disease (pattern favors UIP or fibrotic NSIP)  3.  Diffuse parenchymal mosaic attenuation most likely air trapping.  4.  Mild aneurysmal dilatation aortic root, mid ascending thoracic aorta, aortic arch, and proximal descending thoracic aorta.  5.  Mildly dilated main pulmonary artery, nonspecific, can be seen in pulmonary artery hypertension.  6.  Colonic diverticulosis.  7.  Borderline splenic enlargement, nonspecific. Recommend clinical correlation.  8.  Mildly enlarged prostate. Recommend PSA correlation.    < end of copied text >

## 2021-03-08 NOTE — PHYSICAL THERAPY INITIAL EVALUATION ADULT - ADDITIONAL COMMENTS
Pt. lives in an elevator building, reports that he only started to use SC a few days prior to admission when he started to feel unwell.

## 2021-03-08 NOTE — PROGRESS NOTE ADULT - PROBLEM SELECTOR PLAN 1
On admission, Hb 11.7 with .9 in setting of painless jaundice, concern for hemolysis. Schistocytes on smear. ABG with co-oximetry in conjunction with persistent hypoxia satting 88-92% on 6L NC concerning for possible underlying hemoglobinopathy.   - Daily hemolysis labs (haptoglobin, LDH, Bili)  - Cold Ag positive   - F/u B12/Folate  - Pending remainder of heme/onc workup (mycoplasma Ab, Hb/protein electrophoresis, immunofixation, cryoglobulin)   - Heme/onc consulted, recs appreciated On admission, Hb 11.7 with .9 in setting of painless jaundice, concern for hemolysis. Schistocytes on smear. ABG with co-oximetry in conjunction with persistent hypoxia satting 88-92% on 6L NC concerning for possible underlying hemoglobinopathy.   - Daily hemolysis labs (haptoglobin, LDH, Bili) per heme/onc  - Cold Ag positive   - F/u B12/Folate  - Pending remainder of heme/onc workup (mycoplasma Ab, Hb/protein electrophoresis, immunofixation, cryoglobulin, EBV titer, flow cytometry)   - Heme/onc consulted, recs appreciated

## 2021-03-08 NOTE — PHYSICAL THERAPY INITIAL EVALUATION ADULT - PREDICTED DURATION OF THERAPY (DAYS/WKS), PT EVAL
Pt. would benefit from further gait, balance, endurance training - to facilitate return to prior level of function.

## 2021-03-08 NOTE — PROGRESS NOTE ADULT - ASSESSMENT
75M w/ PMH of afib (on coumadin), ORLANDO (noncompliant with CPAP), who presents to [facility] ED after being sent in by his surgeon Dr. Katarina Mccallum for workup of jaundice. Patient is being admitted for further management of macrocytic anemia of unknown origin with concern for hemolysis. Hematology consulted for further management.     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 Direct alka is negative, which can be seen with cold agglutinin disease.   - Iron studies normal. Follow up B12 and folate level  - HIV, Hep C and Hep B negative   - CT a/p with no evidence of LAD, no hepatomegaly. Borderline splenic enlargement 13.1 cm.   - Check SPEP and serum immunofixation, flow cytometry, mycoplasma ab titers, EBV serologies, cryoglobulins, anti-Ds DNA and YVONNE.     Hypoxia refractory to O2 supplementation   - Co-ox to assess for methemoglobinemia showed normal methemoglobin level.   - CTA negative for PE      To discuss with Dr. Vargas    75M w/ PMH of afib (on coumadin), ORLANDO (noncompliant with CPAP), who presents to [facility] ED after being sent in by his surgeon Dr. Katarina Mccallum for workup of jaundice. Patient is being admitted for further management of macrocytic anemia of unknown origin with concern for hemolysis. Hematology consulted for further management.     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 Direct alka is negative, which does not completely exclude a possibility of cold agglutinin disease, however it is atypical.   - Iron studies normal. Follow up B12 and folate level  - HIV, Hep C and Hep B negative   - CT a/p with no evidence of LAD, no hepatomegaly. Borderline splenic enlargement 13.1 cm.   - Check SPEP and serum immunofixation, flow cytometry, mycoplasma ab titers, EBV serologies, cryoglobulins, ESR, anti-Ds DNA and YVONNE.   - Avoid cold exposure, IV fluid and blood products should be warmed to appropriate temperature before infusion. Warm blankets should be provided as needed.     Hypoxia refractory to O2 supplementation   - Co-ox to assess for methemoglobinemia showed normal methemoglobin level.   - CTA negative for PE     Discussed with Dr. Vargas

## 2021-03-08 NOTE — PHYSICAL THERAPY INITIAL EVALUATION ADULT - PERTINENT HX OF CURRENT PROBLEM, REHAB EVAL
Pt. is a 75 y.o male referred to ED by PMD due to  jaundice and SOB in settings of macrocytic anemia with concern  for hemolysis and underlying hemoglobinopathy.

## 2021-03-08 NOTE — PROGRESS NOTE ADULT - PROBLEM SELECTOR PLAN 4
Patient with hx of Afib, on coumadin and toprol as outpatient.  - Lovenox 90 BID  - Holding Toprol at this time Patient with hx of Afib, on coumadin and toprol as outpatient.  - Lovenox 90 BID on admission -> transition to Eliquis  - Holding Toprol at this time

## 2021-03-08 NOTE — PROGRESS NOTE ADULT - PROBLEM SELECTOR PLAN 6
F: None  E: Replete PRN  N: Diet, Regular   DVT ppx: Lovenox   GI ppx: None  Bowel: None  Dispo: 7LA    FULL CODE F: None  E: Replete PRN  N: Diet, Regular   DVT ppx: Eliquis  GI ppx: None  Bowel: None  Dispo: 7LA    FULL CODE

## 2021-03-08 NOTE — PROGRESS NOTE ADULT - SUBJECTIVE AND OBJECTIVE BOX
YOVANY WARD, 75y, Male  MRN-[id number]  Patient is a 75y old  Male who presents with a chief complaint of Painless jaundice, SOB (08 Mar 2021 11:13)      HOSPITAL COURSE:   74 yo M with PMHx Afib (Coumadin/Toprol), ORLANDO (noncompliant w/ CPAP) presenting to [facility] ED after being sent in from his surgeon (Dr. Katarina Mccallum) for workup of painless jaundice in the setting of macrocytic anemia and hypoxia, concerning for possible underlying hemoglobinopathy. Patient admitted to [facility] for further management of macrocytic anemia of unknown origin with concern for hemolysis and underlying hemoglobinopathy. While on [facility], patient remained stable on 6L NC with heme/onc following for work-up. Studies sent (B12, folate, mycoplasma Ab, cryoglobulin, Hb/protein electrophoresis, immunofixation, EBV titers, flow cytometry) pending results. Patient otherwise HD stable with no active issues and being followed by heme/onc for work-up and management.     SUBJECTIVE: Pt seen and examined at bedside this AM.     12 Point ROS Negative unless noted otherwise above.  -------------------------------------------------------------------------------  VITAL SIGNS:  Vital Signs Last 24 Hrs  T(C): 36.4 (08 Mar 2021 09:01), Max: 36.8 (08 Mar 2021 06:00)  T(F): 97.6 (08 Mar 2021 09:01), Max: 98.2 (08 Mar 2021 06:00)  HR: 75 (08 Mar 2021 09:00) (60 - 76)  BP: 117/56 (08 Mar 2021 09:00) (102/61 - 120/64)  BP(mean): 85 (08 Mar 2021 04:18) (76 - 85)  RR: 20 (08 Mar 2021 09:00) (18 - 20)  SpO2: 92% (08 Mar 2021 11:03) (88% - 95%)  I&O's Summary    07 Mar 2021 07:01  -  08 Mar 2021 07:00  --------------------------------------------------------  IN: 240 mL / OUT: 2200 mL / NET: -1960 mL    08 Mar 2021 07:01  -  08 Mar 2021 12:55  --------------------------------------------------------  IN: 0 mL / OUT: 900 mL / NET: -900 mL        PHYSICAL EXAM:    General: NAD, well developed  HEENT: NC/AT; EOMI, PERRLA, anicteric sclera; moist mucosal membranes.  Neck: supple, trachea midline  Cardiovascular: RRR, +S1/S2; NO M/R/G  Respiratory: CTA B/L; no W/R/R  Gastrointestinal: soft, NT/ND; +BSx4  Extremities: WWP; no edema or cyanosis  Vascular: 2+ radial, DP/PT pulses B/L  Neurological: AAOx3; no focal deficits    ALLERGIES:  Allergies    No Known Allergies    Intolerances        MEDICATIONS:  MEDICATIONS  (STANDING):  enoxaparin Injectable 90 milliGRAM(s) SubCutaneous every 12 hours  folic acid 3 milliGRAM(s) Oral every 24 hours  melatonin 5 milliGRAM(s) Oral at bedtime  polyethylene glycol 3350 17 Gram(s) Oral daily  senna 1 Tablet(s) Oral at bedtime    MEDICATIONS  (PRN):  acetaminophen   Tablet .. 650 milliGRAM(s) Oral every 6 hours PRN Temp greater or equal to 38C (100.4F), Mild Pain (1 - 3)      -------------------------------------------------------------------------------  LABS:                        10.6   4.11  )-----------( 137      ( 08 Mar 2021 06:24 )             33.3     03-08    136  |  103  |  20  ----------------------------<  92  4.5   |  27  |  0.82    Ca    8.8      08 Mar 2021 06:24  Phos  3.2     03-08  Mg     2.2     03-08    TPro  7.0  /  Alb  3.8  /  TBili  2.7<H>  /  DBili  0.6<H>  /  AST  47<H>  /  ALT  13  /  AlkPhos  67  03-08    LIVER FUNCTIONS - ( 08 Mar 2021 06:24 )  Alb: 3.8 g/dL / Pro: 7.0 g/dL / ALK PHOS: 67 U/L / ALT: 13 U/L / AST: 47 U/L / GGT: x               CAPILLARY BLOOD GLUCOSE          Culture - Urine (collected 06 Mar 2021 01:47)  Source: .Urine Clean Catch (Midstream)  Final Report (07 Mar 2021 08:29):    Insignificant amount of mixed growth.      COVID-19 PCR: NotDetec (05 Mar 2021 11:27)      RADIOLOGY & ADDITIONAL TESTS: Reviewed.       YOVANY WARD, 75y, Male  MRN-[id number]  Patient is a 75y old  Male who presents with a chief complaint of Painless jaundice, SOB (08 Mar 2021 11:13)      HOSPITAL COURSE:   74 yo M with PMHx Afib (Coumadin/Toprol), ORLANDO (noncompliant w/ CPAP) presenting to [facility] ED after being sent in from his surgeon (Dr. Katarina Mccallum) for workup of painless jaundice in the setting of macrocytic anemia and hypoxia, concerning for possible underlying hemoglobinopathy. Patient admitted to [facility] for further management of macrocytic anemia of unknown origin with concern for hemolysis and underlying hemoglobinopathy. While on [facility], patient remained stable on 6L NC with heme/onc following for work-up. Studies sent (B12, folate, mycoplasma Ab, cryoglobulin, Hb/protein electrophoresis, immunofixation, EBV titers, flow cytometry) pending results. Patient otherwise HD stable with no active issues and being followed by heme/onc for work-up and management.     SUBJECTIVE: Pt seen and examined at bedside.    12 Point ROS Negative unless noted otherwise above.  -------------------------------------------------------------------------------  VITAL SIGNS:  Vital Signs Last 24 Hrs  T(C): 36.4 (08 Mar 2021 09:01), Max: 36.8 (08 Mar 2021 06:00)  T(F): 97.6 (08 Mar 2021 09:01), Max: 98.2 (08 Mar 2021 06:00)  HR: 75 (08 Mar 2021 09:00) (60 - 76)  BP: 117/56 (08 Mar 2021 09:00) (102/61 - 120/64)  BP(mean): 85 (08 Mar 2021 04:18) (76 - 85)  RR: 20 (08 Mar 2021 09:00) (18 - 20)  SpO2: 92% (08 Mar 2021 11:03) (88% - 95%)  I&O's Summary    07 Mar 2021 07:01  -  08 Mar 2021 07:00  --------------------------------------------------------  IN: 240 mL / OUT: 2200 mL / NET: -1960 mL    08 Mar 2021 07:01  -  08 Mar 2021 12:55  --------------------------------------------------------  IN: 0 mL / OUT: 900 mL / NET: -900 mL        PHYSICAL EXAM:    General: NAD; Lying comfortably in bed. Slightly jaundiced.   HEENT: NC/AT; PERRL, clear conjunctiva; MMM  Neck: Supple. No JVD or thyromegaly   Respiratory: CTA b/l. No wheezes, rhonchi, rales. On 2L NC.  Cardiovascular: +S1/S2; RRR; No murmurs, rubs, gallops.  Abdomen: soft, NT/ND; +BS x4  Extremities: WWP, 2+ peripheral pulses b/l; no LE edema  Neurological: AOx3    ALLERGIES:  Allergies    No Known Allergies    Intolerances        MEDICATIONS:  MEDICATIONS  (STANDING):  enoxaparin Injectable 90 milliGRAM(s) SubCutaneous every 12 hours  folic acid 3 milliGRAM(s) Oral every 24 hours  melatonin 5 milliGRAM(s) Oral at bedtime  polyethylene glycol 3350 17 Gram(s) Oral daily  senna 1 Tablet(s) Oral at bedtime    MEDICATIONS  (PRN):  acetaminophen   Tablet .. 650 milliGRAM(s) Oral every 6 hours PRN Temp greater or equal to 38C (100.4F), Mild Pain (1 - 3)      -------------------------------------------------------------------------------  LABS:                        10.6   4.11  )-----------( 137      ( 08 Mar 2021 06:24 )             33.3     03-08    136  |  103  |  20  ----------------------------<  92  4.5   |  27  |  0.82    Ca    8.8      08 Mar 2021 06:24  Phos  3.2     03-08  Mg     2.2     03-08    TPro  7.0  /  Alb  3.8  /  TBili  2.7<H>  /  DBili  0.6<H>  /  AST  47<H>  /  ALT  13  /  AlkPhos  67  03-08    LIVER FUNCTIONS - ( 08 Mar 2021 06:24 )  Alb: 3.8 g/dL / Pro: 7.0 g/dL / ALK PHOS: 67 U/L / ALT: 13 U/L / AST: 47 U/L / GGT: x               CAPILLARY BLOOD GLUCOSE          Culture - Urine (collected 06 Mar 2021 01:47)  Source: .Urine Clean Catch (Midstream)  Final Report (07 Mar 2021 08:29):    Insignificant amount of mixed growth.      COVID-19 PCR: NotDetec (05 Mar 2021 11:27)      RADIOLOGY & ADDITIONAL TESTS: Reviewed.       YOVANY WARD, 75y, Male  MRN-[id number]  Patient is a 75y old  Male who presents with a chief complaint of Painless jaundice, SOB (08 Mar 2021 11:13)      HOSPITAL COURSE:   76 yo M with PMHx Afib (Coumadin/Toprol), ORLANDO (noncompliant w/ CPAP) presenting to [facility] ED after being sent in from his surgeon (Dr. Katarina Mccallum) for workup of painless jaundice in the setting of macrocytic anemia and hypoxia, concerning for possible underlying hemoglobinopathy. Patient admitted to [facility] for further management of macrocytic anemia of unknown origin with concern for hemolysis and underlying hemoglobinopathy. While on [facility], patient remained stable on 6L NC with heme/onc following for work-up. Studies sent (B12, folate, mycoplasma Ab, cryoglobulin, Hb/protein electrophoresis, immunofixation, EBV titers, flow cytometry) pending results. Patient otherwise HD stable with no active issues and being followed by heme/onc for work-up and management.     SUBJECTIVE: Pt seen and examined at bedside. States that he is only SOB while ambulating. Is fine at rest or sitting up. States jaundice, SOB, palpitations are improved from admission. Denies fevers, chills, HA, chest pain, nausea, vomiting, abdominal pain, diarrhea, constipation, dysuria.     12 Point ROS Negative unless noted otherwise above.  -------------------------------------------------------------------------------  VITAL SIGNS:  Vital Signs Last 24 Hrs  T(C): 36.4 (08 Mar 2021 09:01), Max: 36.8 (08 Mar 2021 06:00)  T(F): 97.6 (08 Mar 2021 09:01), Max: 98.2 (08 Mar 2021 06:00)  HR: 75 (08 Mar 2021 09:00) (60 - 76)  BP: 117/56 (08 Mar 2021 09:00) (102/61 - 120/64)  BP(mean): 85 (08 Mar 2021 04:18) (76 - 85)  RR: 20 (08 Mar 2021 09:00) (18 - 20)  SpO2: 92% (08 Mar 2021 11:03) (88% - 95%)  I&O's Summary    07 Mar 2021 07:01  -  08 Mar 2021 07:00  --------------------------------------------------------  IN: 240 mL / OUT: 2200 mL / NET: -1960 mL    08 Mar 2021 07:01  -  08 Mar 2021 12:55  --------------------------------------------------------  IN: 0 mL / OUT: 900 mL / NET: -900 mL        PHYSICAL EXAM:    General: NAD; sitting comfortably in bed. Mild jaundiced.   HEENT: NC/AT; PERRL, clear conjunctiva; MMM mild scleral icterus.   Neck: Supple. No JVD or thyromegaly   Respiratory: CTA b/l. No wheezes, rhonchi, rales. On 2L NC.  Cardiovascular: +S1/S2; irregular rate and rhythm; No murmurs, rubs, gallops.  Abdomen: soft, NT/ND; +BS x4  Extremities: WWP, 2+ peripheral pulses b/l; no LE edema  Neurological: AOx3    ALLERGIES:  Allergies    No Known Allergies    Intolerances        MEDICATIONS:  MEDICATIONS  (STANDING):  enoxaparin Injectable 90 milliGRAM(s) SubCutaneous every 12 hours  folic acid 3 milliGRAM(s) Oral every 24 hours  melatonin 5 milliGRAM(s) Oral at bedtime  polyethylene glycol 3350 17 Gram(s) Oral daily  senna 1 Tablet(s) Oral at bedtime    MEDICATIONS  (PRN):  acetaminophen   Tablet .. 650 milliGRAM(s) Oral every 6 hours PRN Temp greater or equal to 38C (100.4F), Mild Pain (1 - 3)      -------------------------------------------------------------------------------  LABS:                        10.6   4.11  )-----------( 137      ( 08 Mar 2021 06:24 )             33.3     03-08    136  |  103  |  20  ----------------------------<  92  4.5   |  27  |  0.82    Ca    8.8      08 Mar 2021 06:24  Phos  3.2     03-08  Mg     2.2     03-08    TPro  7.0  /  Alb  3.8  /  TBili  2.7<H>  /  DBili  0.6<H>  /  AST  47<H>  /  ALT  13  /  AlkPhos  67  03-08    LIVER FUNCTIONS - ( 08 Mar 2021 06:24 )  Alb: 3.8 g/dL / Pro: 7.0 g/dL / ALK PHOS: 67 U/L / ALT: 13 U/L / AST: 47 U/L / GGT: x               CAPILLARY BLOOD GLUCOSE          Culture - Urine (collected 06 Mar 2021 01:47)  Source: .Urine Clean Catch (Midstream)  Final Report (07 Mar 2021 08:29):    Insignificant amount of mixed growth.      COVID-19 PCR: NotDetec (05 Mar 2021 11:27)      RADIOLOGY & ADDITIONAL TESTS: Reviewed.

## 2021-03-08 NOTE — PROGRESS NOTE ADULT - PROBLEM SELECTOR PLAN 1
On admission, Hb 11.7 with .9 in setting of painless jaundice, concern for hemolysis. Schistocytes on smear. 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   - Daily hemolysis labs (haptoglobin, LDH, Bili) per heme/onc  - Cold Ag positive   - Hb electrophoresis wnl  - F/u B12/Folate  - Pending remainder of heme/onc workup (mycoplasma Ab, immunofixation, cryoglobulin, EBV titer, flow cytometry)   - Heme/onc consulted, recs appreciated

## 2021-03-08 NOTE — PROGRESS NOTE ADULT - PROBLEM SELECTOR PLAN 6
F: None  E: Replete PRN  N: Diet, Regular   DVT ppx: Eliquis  GI ppx: None  Bowel: None  Dispo: 7LA    FULL CODE

## 2021-03-09 ENCOUNTER — RESULT REVIEW (OUTPATIENT)
Age: 76
End: 2021-03-09

## 2021-03-09 DIAGNOSIS — J84.9 INTERSTITIAL PULMONARY DISEASE, UNSPECIFIED: ICD-10-CM

## 2021-03-09 LAB
ALBUMIN SERPL ELPH-MCNC: 4 G/DL — SIGNIFICANT CHANGE UP (ref 3.3–5)
ALP SERPL-CCNC: 64 U/L — SIGNIFICANT CHANGE UP (ref 40–120)
ALT FLD-CCNC: 30 U/L — SIGNIFICANT CHANGE UP (ref 10–45)
ANION GAP SERPL CALC-SCNC: 7 MMOL/L — SIGNIFICANT CHANGE UP (ref 5–17)
AST SERPL-CCNC: 56 U/L — HIGH (ref 10–40)
BILIRUB DIRECT SERPL-MCNC: 0.7 MG/DL — HIGH (ref 0–0.2)
BILIRUB INDIRECT FLD-MCNC: 2 MG/DL — HIGH (ref 0.2–1)
BILIRUB SERPL-MCNC: 2.8 MG/DL — HIGH (ref 0.2–1.2)
BILIRUB SERPL-MCNC: 2.8 MG/DL — HIGH (ref 0.2–1.2)
BLD GP AB SCN SERPL QL: NEGATIVE — SIGNIFICANT CHANGE UP
BUN SERPL-MCNC: 22 MG/DL — SIGNIFICANT CHANGE UP (ref 7–23)
CALCIUM SERPL-MCNC: 9.2 MG/DL — SIGNIFICANT CHANGE UP (ref 8.4–10.5)
CHLORIDE SERPL-SCNC: 102 MMOL/L — SIGNIFICANT CHANGE UP (ref 96–108)
CO2 SERPL-SCNC: 28 MMOL/L — SIGNIFICANT CHANGE UP (ref 22–31)
CREAT SERPL-MCNC: 0.96 MG/DL — SIGNIFICANT CHANGE UP (ref 0.5–1.3)
CRYOGLOB SERPL-MCNC: NEGATIVE — SIGNIFICANT CHANGE UP
EBV EA AB SER IA-ACNC: <5 U/ML — SIGNIFICANT CHANGE UP
EBV EA AB TITR SER IF: POSITIVE
EBV EA IGG SER-ACNC: NEGATIVE — SIGNIFICANT CHANGE UP
EBV NA IGG SER IA-ACNC: 233 U/ML — HIGH
EBV PATRN SPEC IB-IMP: SIGNIFICANT CHANGE UP
EBV VCA IGG AVIDITY SER QL IA: POSITIVE
EBV VCA IGM SER IA-ACNC: 18.8 U/ML — SIGNIFICANT CHANGE UP
EBV VCA IGM SER IA-ACNC: 647 U/ML — HIGH
EBV VCA IGM TITR FLD: NEGATIVE — SIGNIFICANT CHANGE UP
GLUCOSE SERPL-MCNC: 108 MG/DL — HIGH (ref 70–99)
HAPTOGLOB SERPL-MCNC: <10 MG/DL — LOW (ref 34–200)
HCT VFR BLD CALC: 34.5 % — LOW (ref 39–50)
HGB BLD-MCNC: 10.9 G/DL — LOW (ref 13–17)
LDH SERPL L TO P-CCNC: 457 U/L — HIGH (ref 50–242)
MAGNESIUM SERPL-MCNC: 2 MG/DL — SIGNIFICANT CHANGE UP (ref 1.6–2.6)
MCHC RBC-ENTMCNC: 31.6 GM/DL — LOW (ref 32–36)
MCHC RBC-ENTMCNC: 39.1 PG — HIGH (ref 27–34)
MCV RBC AUTO: 123.7 FL — HIGH (ref 80–100)
NRBC # BLD: 0 /100 WBCS — SIGNIFICANT CHANGE UP (ref 0–0)
PHOSPHATE SERPL-MCNC: 3.4 MG/DL — SIGNIFICANT CHANGE UP (ref 2.5–4.5)
PLATELET # BLD AUTO: 146 K/UL — LOW (ref 150–400)
POTASSIUM SERPL-MCNC: 4.4 MMOL/L — SIGNIFICANT CHANGE UP (ref 3.5–5.3)
POTASSIUM SERPL-SCNC: 4.4 MMOL/L — SIGNIFICANT CHANGE UP (ref 3.5–5.3)
PROT SERPL-MCNC: 6.8 G/DL — SIGNIFICANT CHANGE UP (ref 6–8.3)
RBC # BLD: 2.79 M/UL — LOW (ref 4.2–5.8)
RBC # FLD: 12.9 % — SIGNIFICANT CHANGE UP (ref 10.3–14.5)
RH IG SCN BLD-IMP: NEGATIVE — SIGNIFICANT CHANGE UP
SODIUM SERPL-SCNC: 137 MMOL/L — SIGNIFICANT CHANGE UP (ref 135–145)
WBC # BLD: 4.04 K/UL — SIGNIFICANT CHANGE UP (ref 3.8–10.5)
WBC # FLD AUTO: 4.04 K/UL — SIGNIFICANT CHANGE UP (ref 3.8–10.5)

## 2021-03-09 PROCEDURE — 99233 SBSQ HOSP IP/OBS HIGH 50: CPT | Mod: GC

## 2021-03-09 PROCEDURE — 99222 1ST HOSP IP/OBS MODERATE 55: CPT

## 2021-03-09 PROCEDURE — 99223 1ST HOSP IP/OBS HIGH 75: CPT | Mod: GC

## 2021-03-09 PROCEDURE — 88189 FLOWCYTOMETRY/READ 16 & >: CPT

## 2021-03-09 PROCEDURE — 93306 TTE W/DOPPLER COMPLETE: CPT | Mod: 26

## 2021-03-09 RX ADMIN — Medication 3 MILLIGRAM(S): at 18:56

## 2021-03-09 RX ADMIN — ENOXAPARIN SODIUM 90 MILLIGRAM(S): 100 INJECTION SUBCUTANEOUS at 11:25

## 2021-03-09 RX ADMIN — ENOXAPARIN SODIUM 90 MILLIGRAM(S): 100 INJECTION SUBCUTANEOUS at 22:27

## 2021-03-09 RX ADMIN — Medication 12.5 MILLIGRAM(S): at 18:56

## 2021-03-09 RX ADMIN — SENNA PLUS 1 TABLET(S): 8.6 TABLET ORAL at 22:27

## 2021-03-09 RX ADMIN — POLYETHYLENE GLYCOL 3350 17 GRAM(S): 17 POWDER, FOR SOLUTION ORAL at 11:25

## 2021-03-09 RX ADMIN — Medication 5 MILLIGRAM(S): at 22:27

## 2021-03-09 RX ADMIN — Medication 12.5 MILLIGRAM(S): at 06:41

## 2021-03-09 NOTE — PROGRESS NOTE ADULT - SUBJECTIVE AND OBJECTIVE BOX
Patient was seen and examined by me at bedside. I agree with resident's note, subjective, objective physical exam, assessment and plan with following modifications/additions.    Greater than 35 minutes spent on total encounter; more than 50% of the visit was spent counseling and/or coordinating care by the attending physician.    75M w/ PMH of afib (on coumadin), ORLANDO (noncompliant with CPAP), who presents to [facility] ED for weeks of intermittent jaundice and progressive dsypnea. Patient found to have ILD on CT chest, cold ag positive hemolytic anemia likely also causing marked macrocytosis, persistent hypoxia despite oxygenation ?shunt, with TTE today with pulm HTN and PFO and mild systolic failure   -ILD- formal pulm consult to review etiologies and any further workup/treatment- bronch/steroids. does endorse exposure to asbestosis in the past.   -Pulm HTN and HFrEF and PFO- unclear if PFO explains shunt physiology for oxygen when upright (platypnea-orthodeoxia syndrome), will ask cards to comment if needs further diuresis at this time as appears euvolemic but dilatated RV.   -Hemolytic anemia cold ag, also mild spenlomegaly- unlikely MDS with only macroytic anemia but can send flow and discuss if biopsy needed, hx of mycoplasma PNA seen but not active may be trigger   -Dispo- home per PT, likely to need home 02, will sort out today further planned workup to decide what can be done in house vs as outpt        Ney Saenz MD [phone]

## 2021-03-09 NOTE — DIETITIAN INITIAL EVALUATION ADULT. - PROBLEM SELECTOR PLAN 5
On chest CT on admission, showing bilateral bronchiectasis and diffuse subpleural reticular opacities with basilar predominance are suggestive of nonspecific interstitial lung disease (pattern favors UIP or fibrotic NSIP). Pt with hx of ORLANDO (not compliant with CPAP), no other known prior pulm hx.   - Will need pulm f/u as outpatient

## 2021-03-09 NOTE — CONSULT NOTE ADULT - ASSESSMENT
Patient has been scheduled.   75 M w/ hx of persistent chronic atrial fibrillation, ORLANDO, Interstitial Lung Disease, admitted to medical service for evaluation of worsening dyspnea w/ exertion, fatigue, and weakness. Patient also noted to be jaundiced and being worked up for hemolytic anemia. Cardiology consulted for PFO noted on transthoracic echocardiogram.    #Patent Foramen Ovale: Noted on transthoracic echocardiogram w/ RV dilation and normal RV systolic function. LV EF 45% w/ mild-moderate aortic insufficiency.     #Atrial Fibrillation: persistent, rate controlled  - c/w Metoprolol Tartrate 12.5 BID  - c/w Lovenox for AC; can transition to Eliquis 5mg BID     75 M w/ hx of persistent chronic atrial fibrillation, ORLANDO, Interstitial Lung Disease, admitted to medical service for evaluation of worsening dyspnea w/ exertion, fatigue, and weakness. Patient also noted to be jaundiced and being worked up for hemolytic anemia. Cardiology consulted for PFO noted on transthoracic echocardiogram.    #Patent Foramen Ovale: Noted on transthoracic echocardiogram w/ RV dilation and normal RV systolic function. LV EF 45% w/ mild-moderate aortic insufficiency. Clinically euvolemic     #Atrial Fibrillation: persistent, rate controlled  - c/w Metoprolol Tartrate 12.5 BID  - c/w Lovenox for AC; can transition to Eliquis 5mg BID     75 M w/ hx of persistent chronic atrial fibrillation, ORLANDO, Interstitial Lung Disease, admitted to medical service for evaluation of worsening dyspnea w/ exertion, fatigue, and weakness. Patient also noted to be jaundiced and being worked up for hemolytic anemia. Cardiology consulted for PFO noted on transthoracic echocardiogram.    #Patent Foramen [place]vale: Noted on transthoracic echocardiogram w/ RV dilation and normal RV systolic function.  RV dilation likely due to underlying pulmonary pathology and ORLANDO. PFO likely incidental &  unlikely contributing to patients exertional dyspnea.    - No intervention needed for PFO    #Atrial Fibrillation: persistent, rate controlled  - c/w Metoprolol Tartrate 12.5 BID  - c/w Lovenox for AC; can transition to Eliquis 5mg BID    #Heart Failure w/ EF 45%, globally reduced w/ mild-moderate aortic insufficiency. Clinically euvolemic.  - continue with beta blocker as above  - continue with home dose lasix 20mg PO daily   - can follow up with [facility] in [place] after discharge for ischemic evaluation     discussed w/ primary team. please re-consult cardiology for any further questions or concerns      75 M w/ hx of persistent chronic atrial fibrillation, ORLANDO, Interstitial Lung Disease, admitted to medical service for evaluation of worsening dyspnea w/ exertion, fatigue, and weakness. Patient also noted to be jaundiced and being worked up for hemolytic anemia. Cardiology consulted for PFO noted on transthoracic echocardiogram.    #Patent Foramen [place]ale: Noted on transthoracic echocardiogram w/ RV dilation and normal RV systolic function.  RV dilation likely due to underlying pulmonary pathology and ORLANDO. PFO likely incidental &  unlikely contributing to patients exertional dyspnea.    - No intervention needed for PFO    #Atrial Fibrillation: persistent, rate controlled  - c/w Metoprolol Tartrate 12.5 BID  - c/w Lovenox for AC; can transition to Eliquis 5mg BID    #Heart Failure w/ EF 45%, globally reduced w/ mild-moderate aortic insufficiency. Clinically euvolemic.  - continue with beta blocker as above  - continue with home dose lasix 20mg PO daily   - can follow up with [facility] in [place] after discharge for ischemic evaluation if indicated as outpatine    discussed w/ primary team. please re-consult cardiology for any further questions or concerns         Fiona Jiménez MD  Cardiology consult attending

## 2021-03-09 NOTE — CONSULT NOTE ADULT - SUBJECTIVE AND OBJECTIVE BOX
PULMONOLOGY SERVICE INITIAL CONSULT NOTE    HPI:  75M w/ PMH of afib (on coumadin, toprol), [place] (noncompliant with CPAP), who presents to [facility] ED after being sent in by his surgeon Dr. Katarina Mccallum for workup of his painless jaundice. The pt reports that he has been having progressively worsening dyspnea on exertion, fatigue, and generalized weakness over the past month. He went to see his PCP 2wks ago who noted that he appeared jaundiced, and obtained labs which showed Tbili 6.9. He was referred to surgeon Dr. Mccallum who wanted him to come into the hospital for evaluation of painless jaundice and elevated CA 19-9.    Pt found to have b/l bronchiectasis with diffuse subpleural and basilar reticular opacities suggestive of interstitial lung disease during this admission for which pulmonology was consulted. Patient states that about 8 years ago, he followed with a pulmonologist for 3 years, who found 'abnormalities in his left lung'. States that he was scheduled for follow up appointments twice a year at which time he also had repeat CT chest scans to monitor for progression of his disease. States that he does not know the diagnosis, denies having nodules in his lung. About 6 months ago, he developed intermittent dry cough, which became worse around December 2020. Pt emigrated from [place] over 30 years ago. Endorses asbestos exposure while working as an . Pt denies exposure to birds, cats, or other pets. denies family history of lung/rheumatological/allergic disorders.    REVIEW OF SYSTEMS:   Denies fevers, shortness of breath, sputum production, hemoptysis, chest pain, LE swelling.     PAST MEDICAL HISTORY:   Afib, ORLANDO, 'aortic valve disease'     FAMILY HISTORY:  Denies lung/rheumatological/allergic disorders.    SOCIAL HISTORY:  Used to work as an  in a old building, endorses exposure to asbestos at that time.     Tobacco use: smoked 12 cigarettes for 3 years, stopped >25yrs ago.  EtOH use: 5 shots of whiskey twice a week  Illicit drug use: none  Herbal supplements: none    MEDICATIONS:  MEDICATIONS  (STANDING):  enoxaparin Injectable 90 milliGRAM(s) SubCutaneous every 12 hours  folic acid 3 milliGRAM(s) Oral every 24 hours  melatonin 5 milliGRAM(s) Oral at bedtime  metoprolol tartrate 12.5 milliGRAM(s) Oral two times a day  polyethylene glycol 3350 17 Gram(s) Oral daily  senna 1 Tablet(s) Oral at bedtime    MEDICATIONS  (PRN):  acetaminophen   Tablet .. 650 milliGRAM(s) Oral every 6 hours PRN Temp greater or equal to 38C (100.4F), Mild Pain (1 - 3)      ALLERGIES:  Allergies    No Known Allergies    Intolerances        VITAL SIGNS:  Vital Signs Last 24 Hrs  T(C): 36.5 (09 Mar 2021 06:30), Max: 36.5 (09 Mar 2021 06:30)  T(F): 97.7 (09 Mar 2021 06:30), Max: 97.7 (09 Mar 2021 06:30)  HR: 76 (09 Mar 2021 06:30) (70 - 90)  BP: 111/68 (09 Mar 2021 06:30) (99/59 - 111/68)  BP(mean): --  RR: 18 (09 Mar 2021 06:30) (18 - 19)  SpO2: 90% (09 Mar 2021 06:30) (88% - 95%)    03-08-21 @ 07:01  -  03-09-21 @ 07:00  --------------------------------------------------------  IN:  Total IN: 0 mL    OUT:    Voided (mL): 900 mL  Total OUT: 900 mL    Total NET: -900 mL          PHYSICAL EXAM:  Constitutional: Jaundiced, WDWN resting comfortably in bed; NAD  Eyes: PERRL, EOMI, icteric sclera  ENT: no nasal discharge; uvula midline, no oropharyngeal erythema or exudates; MMM  Neck: supple; no JVD or thyromegaly  Respiratory: Barrel shaped chest, no accessory muscle use. CTA B/L; no W/R/R, no retractions  Cardiac: +S1/S2; irregular rhythm  Gastrointestinal: abdomen soft, NT/ND; no rebound or guarding; +BSx4  Back: spine midline, no bony tenderness or step-offs; no CVAT B/L  Extremities: WWP, no clubbing or cyanosis; no peripheral edema  Musculoskeletal: NROM x4; no joint swelling, tenderness or erythema  Vascular: 2+ radial, femoral, DP/PT pulses B/L  Dermatologic: skin warm, dry and intact; no rashes, wounds, or scars  Lymphatic: no submandibular or cervical LAD  Neurologic: AAOx3; CNII-XII grossly intact; no focal deficits  Psychiatric: affect and characteristics of appearance, verbalizations, behaviors are appropriate    LABS:                        10.9   4.04  )-----------( 146      ( 09 Mar 2021 05:52 )             34.5     03-09    137  |  102  |  22  ----------------------------<  108<H>  4.4   |  28  |  0.96    Ca    9.2      09 Mar 2021 05:52  Phos  3.4     03-09  Mg     2.0     03-09    TPro  6.8  /  Alb  4.0  /  TBili  2.8<H>  /  DBili  0.7<H>  /  AST  56<H>  /  ALT  30  /  AlkPhos  64  03-09            CAPILLARY BLOOD GLUCOSE              RADIOLOGY & ADDITIONAL TESTS: Reviewed. PULMONOLOGY SERVICE INITIAL CONSULT NOTE    HPI:  75M w/ PMH of afib (on coumadin, toprol), [place] (noncompliant with CPAP), who presents to [facility] ED after being sent in by his surgeon Dr. Katarina Mccallum for workup of his painless jaundice. The pt reports that he has been having progressively worsening dyspnea on exertion, fatigue, and generalized weakness over the past month. He went to see his PCP 2wks ago who noted that he appeared jaundiced, and obtained labs which showed Tbili 6.9. He was referred to surgeon Dr. Mccallum who wanted him to come into the hospital for evaluation of painless jaundice and elevated CA 19-9.    Pt found to have b/l bronchiectasis with diffuse subpleural and basilar reticular opacities suggestive of interstitial lung disease during this admission for which pulmonology was consulted. Patient states that about 8 years ago, he followed with a pulmonologist for 3 years, who found 'abnormalities in his left lung'. States that he was scheduled for follow up appointments twice a year at which time he also had repeat CT chest scans to monitor for progression of his disease. States that he does not know the diagnosis, denies having nodules in his lung. About 6 months ago, he developed intermittent dry cough, which became worse around December 2020. Denies fevers, shortness of breath, sputum production, hemoptysis, chest pain, LE swelling. Pt emigrated from [place] over 30 years ago. Endorses asbestos exposure while working as an . Pt denies exposure to birds, cats, or other pets. denies family history of lung/rheumatological/allergic disorders.    REVIEW OF SYSTEMS:   Denies fevers, shortness of breath, sputum production, hemoptysis, chest pain, LE swelling.     PAST MEDICAL HISTORY:   Afib, ORLANDO, 'aortic valve disease'     FAMILY HISTORY:  Denies lung/rheumatological/allergic disorders.    SOCIAL HISTORY:  Immigrated from [place] 30 years ago. 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     Tobacco use: smoked 12 cigarettes for 3 years, stopped >25yrs ago.  EtOH use: 5 shots of whiskey twice a week  Illicit drug use: none  Herbal supplements: none    MEDICATIONS:  MEDICATIONS  (STANDING):  enoxaparin Injectable 90 milliGRAM(s) SubCutaneous every 12 hours  folic acid 3 milliGRAM(s) Oral every 24 hours  melatonin 5 milliGRAM(s) Oral at bedtime  metoprolol tartrate 12.5 milliGRAM(s) Oral two times a day  polyethylene glycol 3350 17 Gram(s) Oral daily  senna 1 Tablet(s) Oral at bedtime    MEDICATIONS  (PRN):  acetaminophen   Tablet .. 650 milliGRAM(s) Oral every 6 hours PRN Temp greater or equal to 38C (100.4F), Mild Pain (1 - 3)      ALLERGIES:  Allergies    No Known Allergies    Intolerances        VITAL SIGNS:  Vital Signs Last 24 Hrs  T(C): 36.5 (09 Mar 2021 06:30), Max: 36.5 (09 Mar 2021 06:30)  T(F): 97.7 (09 Mar 2021 06:30), Max: 97.7 (09 Mar 2021 06:30)  HR: 76 (09 Mar 2021 06:30) (70 - 90)  BP: 111/68 (09 Mar 2021 06:30) (99/59 - 111/68)  BP(mean): --  RR: 18 (09 Mar 2021 06:30) (18 - 19)  SpO2: 90% (09 Mar 2021 06:30) (88% - 95%)    03-08-21 @ 07:01  -  03-09-21 @ 07:00  --------------------------------------------------------  IN:  Total IN: 0 mL    OUT:    Voided (mL): 900 mL  Total OUT: 900 mL    Total NET: -900 mL          PHYSICAL EXAM:  Constitutional: Jaundiced, WDWN resting comfortably in bed; NAD  Eyes: PERRL, EOMI, icteric sclera  ENT: no nasal discharge; uvula midline, no oropharyngeal erythema or exudates; MMM  Neck: supple; no JVD or thyromegaly  Respiratory: Barrel shaped chest, no accessory muscle use. CTA B/L; no W/R/R, no retractions  Cardiac: +S1/S2; irregular rhythm  Gastrointestinal: abdomen soft, NT/ND; no rebound or guarding; +BSx4  Back: spine midline, no bony tenderness or step-offs; no CVAT B/L  Extremities: WWP, no clubbing or cyanosis; no peripheral edema  Musculoskeletal: NROM x4; no joint swelling, tenderness or erythema  Vascular: 2+ radial, femoral, DP/PT pulses B/L  Dermatologic: skin warm, dry and intact; no rashes, wounds, or scars  Lymphatic: no submandibular or cervical LAD  Neurologic: AAOx3; CNII-XII grossly intact; no focal deficits  Psychiatric: affect and characteristics of appearance, verbalizations, behaviors are appropriate    LABS:                        10.9   4.04  )-----------( 146      ( 09 Mar 2021 05:52 )             34.5     03-09    137  |  102  |  22  ----------------------------<  108<H>  4.4   |  28  |  0.96    Ca    9.2      09 Mar 2021 05:52  Phos  3.4     03-09  Mg     2.0     03-09    TPro  6.8  /  Alb  4.0  /  TBili  2.8<H>  /  DBili  0.7<H>  /  AST  56<H>  /  ALT  30  /  AlkPhos  64  03-09            CAPILLARY BLOOD GLUCOSE              RADIOLOGY & ADDITIONAL TESTS: Reviewed. INCOMPLETE  PULMONOLOGY SERVICE INITIAL CONSULT NOTE    HPI:  75M w/ PMH of afib (on coumadin, toprol), [place] (noncompliant with CPAP), who presents to [facility] ED after being sent in by his surgeon Dr. Katarina Mccallum for workup of his painless jaundice. The pt reports that he has been having progressively worsening dyspnea on exertion, fatigue, and generalized weakness over the past month. He went to see his PCP 2wks ago who noted that he appeared jaundiced, and obtained labs which showed Tbili 6.9. He was referred to surgeon Dr. Mccallum who wanted him to come into the hospital for evaluation of painless jaundice and elevated CA 19-9.    Pt found to have hypoxia, dyspnea and CT findings of b/l bronchiectasis with diffuse subpleural and basilar reticular opacities suggestive of interstitial lung disease during this admission for which pulmonology was consulted. Endorses chronic dyspnea for over 10 years - states that he can walk until the end of the hospital hallway before he needs to stop to catch his breath. About 6 months ago, he developed intermittent dry cough, which became worse around December 2020. Denies fevers, sputum production, hemoptysis, chest pain, LE swelling. Patient states that about 8 years ago, he followed with a pulmonologist for 3 years, who found 'haziness' in his left lung. States that he was scheduled for follow up appointments twice a year at which time he also had repeat CT chest scans to monitor for progression of his disease. States that he does not know the diagnosis, denies being told that he has a nodule in his lung. Pt emigrated from [place] over 30 years ago. Endorses asbestos exposure while working as an . Pt denies exposure to birds, cats, or other pets. denies family history of lung/rheumatological/allergic disorders.    REVIEW OF SYSTEMS:   Denies fevers, sputum production, hemoptysis, chest pain, LE swelling.     PAST MEDICAL HISTORY:   Afib, ORLANDO, 'aortic valve disease'     FAMILY HISTORY:  Denies lung/rheumatological/allergic disorders.    SOCIAL HISTORY:  Immigrated from [place] 30 years ago. 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     Tobacco use: smoked 12 cigarettes for 3 years, stopped >25yrs ago.  EtOH use: 5 shots of whiskey twice a week  Illicit drug use: none  Herbal supplements: none    MEDICATIONS:  MEDICATIONS  (STANDING):  enoxaparin Injectable 90 milliGRAM(s) SubCutaneous every 12 hours  folic acid 3 milliGRAM(s) Oral every 24 hours  melatonin 5 milliGRAM(s) Oral at bedtime  metoprolol tartrate 12.5 milliGRAM(s) Oral two times a day  polyethylene glycol 3350 17 Gram(s) Oral daily  senna 1 Tablet(s) Oral at bedtime    MEDICATIONS  (PRN):  acetaminophen   Tablet .. 650 milliGRAM(s) Oral every 6 hours PRN Temp greater or equal to 38C (100.4F), Mild Pain (1 - 3)      ALLERGIES:  Allergies    No Known Allergies    Intolerances        VITAL SIGNS:  Vital Signs Last 24 Hrs  T(C): 36.5 (09 Mar 2021 06:30), Max: 36.5 (09 Mar 2021 06:30)  T(F): 97.7 (09 Mar 2021 06:30), Max: 97.7 (09 Mar 2021 06:30)  HR: 76 (09 Mar 2021 06:30) (70 - 90)  BP: 111/68 (09 Mar 2021 06:30) (99/59 - 111/68)  BP(mean): --  RR: 18 (09 Mar 2021 06:30) (18 - 19)  SpO2: 90% (09 Mar 2021 06:30) (88% - 95%)    03-08-21 @ 07:01  -  03-09-21 @ 07:00  --------------------------------------------------------  IN:  Total IN: 0 mL    OUT:    Voided (mL): 900 mL  Total OUT: 900 mL    Total NET: -900 mL          PHYSICAL EXAM:  Constitutional: Jaundiced, WDWN resting comfortably in bed; NAD  Eyes: PERRL, EOMI, icteric sclera  ENT: no nasal discharge; uvula midline, no oropharyngeal erythema or exudates; MMM  Neck: supple; no JVD or thyromegaly  Respiratory: Barrel shaped chest, no accessory muscle use. CTA B/L; no W/R/R, no retractions  Cardiac: +S1/S2; irregular rhythm  Gastrointestinal: abdomen soft, NT/ND; no rebound or guarding; +BSx4  Back: spine midline, no bony tenderness or step-offs; no CVAT B/L  Extremities: WWP, no clubbing or cyanosis; no peripheral edema  Musculoskeletal: NROM x4; no joint swelling, tenderness or erythema  Vascular: 2+ radial, femoral, DP/PT pulses B/L  Dermatologic: skin warm, dry and intact; no rashes, wounds, or scars  Lymphatic: no submandibular or cervical LAD  Neurologic: AAOx3; CNII-XII grossly intact; no focal deficits  Psychiatric: affect and characteristics of appearance, verbalizations, behaviors are appropriate    LABS:                        10.9   4.04  )-----------( 146      ( 09 Mar 2021 05:52 )             34.5     03-09    137  |  102  |  22  ----------------------------<  108<H>  4.4   |  28  |  0.96    Ca    9.2      09 Mar 2021 05:52  Phos  3.4     03-09  Mg     2.0     03-09    TPro  6.8  /  Alb  4.0  /  TBili  2.8<H>  /  DBili  0.7<H>  /  AST  56<H>  /  ALT  30  /  AlkPhos  64  03-09            CAPILLARY BLOOD GLUCOSE              RADIOLOGY & ADDITIONAL TESTS: Reviewed. PULMONOLOGY SERVICE INITIAL CONSULT NOTE    HPI:  75M w/ PMH of afib (on coumadin, toprol), [place] (noncompliant with CPAP), who presents to [facility] ED after being sent in by his surgeon Dr. Katarina Mccallum for workup of his painless jaundice. The pt reports that he has been having progressively worsening dyspnea on exertion, fatigue, and generalized weakness over the past month. He went to see his PCP 2wks ago who noted that he appeared jaundiced, and obtained labs which showed Tbili 6.9. He was referred to surgeon Dr. Mccallum who wanted him to come into the hospital for evaluation of painless jaundice and elevated CA 19-9.    Pt found to have hypoxia, dyspnea and CT findings of b/l bronchiectasis with diffuse subpleural and basilar reticular opacities suggestive of interstitial lung disease during this admission for which pulmonology was consulted. Endorses chronic dyspnea for over 10 years - states that he can walk until the end of the hospital hallway before he needs to stop to catch his breath. About 6 months ago, he developed intermittent dry cough, which became worse around December 2020. Denies fevers, sputum production, hemoptysis, chest pain, LE swelling. Patient states that about 8 years ago, he followed with a pulmonologist for 3 years, who found 'haziness' in his left lung. States that he was scheduled for follow up appointments twice a year at which time he also had repeat CT chest scans to monitor for progression of his disease. States that he does not know the diagnosis, denies being told that he has a nodule in his lung. Pt emigrated from [place] over 30 years ago. Endorses asbestos exposure while working as an . Pt denies exposure to birds, cats, or other pets. denies family history of lung/rheumatological/allergic disorders.    REVIEW OF SYSTEMS:   Denies fevers, sputum production, hemoptysis, rashes, joint pain, muscle pain, chest pain, LE swelling.     PAST MEDICAL HISTORY:   Afib, ORLANDO, 'aortic valve disease'     FAMILY HISTORY:  Denies lung/rheumatological/allergic disorders.    SOCIAL HISTORY:  Immigrated from [place] 30 years ago. 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     Tobacco use: smoked 12 cigarettes for 3 years, stopped >25yrs ago.  EtOH use: 5 shots of whiskey twice a week  Illicit drug use: none  Herbal supplements: none    MEDICATIONS:  MEDICATIONS  (STANDING):  enoxaparin Injectable 90 milliGRAM(s) SubCutaneous every 12 hours  folic acid 3 milliGRAM(s) Oral every 24 hours  melatonin 5 milliGRAM(s) Oral at bedtime  metoprolol tartrate 12.5 milliGRAM(s) Oral two times a day  polyethylene glycol 3350 17 Gram(s) Oral daily  senna 1 Tablet(s) Oral at bedtime    MEDICATIONS  (PRN):  acetaminophen   Tablet .. 650 milliGRAM(s) Oral every 6 hours PRN Temp greater or equal to 38C (100.4F), Mild Pain (1 - 3)      ALLERGIES:  Allergies    No Known Allergies    Intolerances        VITAL SIGNS:  Vital Signs Last 24 Hrs  T(C): 36.5 (09 Mar 2021 06:30), Max: 36.5 (09 Mar 2021 06:30)  T(F): 97.7 (09 Mar 2021 06:30), Max: 97.7 (09 Mar 2021 06:30)  HR: 76 (09 Mar 2021 06:30) (70 - 90)  BP: 111/68 (09 Mar 2021 06:30) (99/59 - 111/68)  BP(mean): --  RR: 18 (09 Mar 2021 06:30) (18 - 19)  SpO2: 90% (09 Mar 2021 06:30) (88% - 95%)    03-08-21 @ 07:01  -  03-09-21 @ 07:00  --------------------------------------------------------  IN:  Total IN: 0 mL    OUT:    Voided (mL): 900 mL  Total OUT: 900 mL    Total NET: -900 mL          PHYSICAL EXAM:  Constitutional: Jaundiced, WDWN resting comfortably in bed; NAD  Eyes: PERRL, EOMI, icteric sclera  ENT: no nasal discharge; uvula midline, no oropharyngeal erythema or exudates; MMM  Neck: supple; no JVD or thyromegaly  Respiratory: Barrel shaped chest, no accessory muscle use. CTA B/L; no W/R/R, no retractions  Cardiac: +S1/S2; irregular rhythm  Gastrointestinal: abdomen soft, NT/ND; no rebound or guarding; +BSx4  Back: spine midline, no bony tenderness or step-offs; no CVAT B/L  Extremities: WWP, no clubbing or cyanosis; no peripheral edema  Musculoskeletal: NROM x4; no joint swelling, tenderness or erythema  Vascular: 2+ radial, femoral, DP/PT pulses B/L  Dermatologic: skin warm, dry and intact; no rashes, wounds, or scars  Lymphatic: no submandibular or cervical LAD  Neurologic: AAOx3; CNII-XII grossly intact; no focal deficits  Psychiatric: affect and characteristics of appearance, verbalizations, behaviors are appropriate    LABS:                        10.9   4.04  )-----------( 146      ( 09 Mar 2021 05:52 )             34.5     03-09    137  |  102  |  22  ----------------------------<  108<H>  4.4   |  28  |  0.96    Ca    9.2      09 Mar 2021 05:52  Phos  3.4     03-09  Mg     2.0     03-09    TPro  6.8  /  Alb  4.0  /  TBili  2.8<H>  /  DBili  0.7<H>  /  AST  56<H>  /  ALT  30  /  AlkPhos  64  03-09            CAPILLARY BLOOD GLUCOSE              RADIOLOGY & ADDITIONAL TESTS: Reviewed.

## 2021-03-09 NOTE — DIETITIAN INITIAL EVALUATION ADULT. - ORAL INTAKE PTA/DIET HISTORY
patient generally eats "everything" resides alone.  [id number].Does simple cooking No specific restrictions PTA.

## 2021-03-09 NOTE — PROGRESS NOTE ADULT - PROBLEM SELECTOR PLAN 6
F: None  E: Replete PRN  N: Diet, Regular   DVT ppx: Eliquis  GI ppx: None  Bowel: None  Dispo: F    FULL CODE

## 2021-03-09 NOTE — DIETITIAN INITIAL EVALUATION ADULT. - PROBLEM SELECTOR PLAN 2
Patient presenting with painless jaundice with elevated TBili. Had been previously worked up as outpatient with concern for elevated CA 19-9.  - Likely in setting of hemolysis  - Management as above

## 2021-03-09 NOTE — PROGRESS NOTE ADULT - ASSESSMENT
74 yo M with PMHx Afib (Coumadin/Toprol), [place] (noncompliant w/ CPAP) presenting to [facility] ED after being sent in from his surgeon (Dr. Katarina Mccallum) for workup of painless jaundice in the setting of macrocytic anemia and hypoxia, concerning for possible underlying hemoglobinopathy. Pt now being stepped down for further management of macrocytic anemia of unknown origin with concern for hemolysis and underlying hemoglobinopathy.

## 2021-03-09 NOTE — PROGRESS NOTE ADULT - SUBJECTIVE AND OBJECTIVE BOX
Hematology Oncology Progress Note (Dr. Vargas )  Discussed with Dr. Vargas and recommendations reviewed with the primary team.    SUBJECTIVE: Patient reports sob and palpitations on exertion. Requiring 2L via NC.     Interval HPI: Mr. Cooney is a 75 year old male with PMH of Afib on coumadin and ORLANDO (noncompliant with CPAP) sent in by his surgeon Dr. Katarina Mccallum for workup of his painless jaundice. Patient reports generalized weakness, worsening VIGIL and fatigue over the past month, getting worse in the last 2 weeks. He was seen by his PCP 2 weeks ago and was noted to have jaundice and was referred to Dr. Mccallum for further evaluation, which showed elevated CA 19-9. Of note, he reports a prior hospitalization at [facility] for ?aortic valve infection.     ED Course:   Vitals: T 97.3, HR 95, /71, RR 24, O2sat 88% on room air. placed on 3L nc and ventimask with improvement to 93-95%.  Labs: Hb 11.7, .9 | INR 1.42 | Tbili 4.8, AST 53, ALT wnl, alk phos wnl | lactate 2.5 | d-dimer 930  EKG with A.fib with normal HR.   CTA with no evidence of PE, bilateral bronchiectasis and diffuse subpleural reticular opacities with basilar predominance are suggestive of nonspecific interstitial lung disease (pattern favors UIP or fibrotic NSIP), Diffuse parenchymal mosaic attenuation most likely air trapping.  Given 500cc NS. ICU consulted for hypoxia. ABG obtained which showed discordance between pO2 and [id number] (ABG: pH 7.41, pCO2 34, pO2 227, HCO3 21, O2sat 91%). Negative for methemoglobinemia. Concern for hemoglobinopathy, limiting oxygenation.    Hematology consulted for further evaluation of macrocytic anemia. Patient seen and examined. Reports difficult breathing and palpitations. Denies any chest pain, headache, confusion, blurry vision, abdominal pain, n/v/d, leg pain or swelling. He denies any prior episode of jaundice in the past. He denies any recent antibiotic use or new medications.     PAST MEDICAL & SURGICAL HISTORY:  Sleep apnea, non-compliant with BiPaP  Afib  No significant past surgical history    Allergies: NKDA    Social History: Former smoker, 15 cig/day x 5 years   5 shots of whiskey, 2x per week. Denies any ilicit drug use.     MEDICATIONS  (STANDING):  MEDICATIONS  (STANDING):  enoxaparin Injectable 90 milliGRAM(s) SubCutaneous every 12 hours  folic acid 3 milliGRAM(s) Oral every 24 hours  melatonin 5 milliGRAM(s) Oral at bedtime  metoprolol tartrate 12.5 milliGRAM(s) Oral two times a day  polyethylene glycol 3350 17 Gram(s) Oral daily  senna 1 Tablet(s) Oral at bedtime    MEDICATIONS  (PRN):  acetaminophen   Tablet .. 650 milliGRAM(s) Oral every 6 hours PRN Temp greater or equal to 38C (100.4F), Mild Pain (1 - 3)      PHYSICAL EXAM:    Vital Signs Last 24 Hrs  T(C): 36.5 (09 Mar 2021 06:30), Max: 36.5 (09 Mar 2021 06:30)  T(F): 97.7 (09 Mar 2021 06:30), Max: 97.7 (09 Mar 2021 06:30)  HR: 76 (09 Mar 2021 06:30) (70 - 90)  BP: 111/68 (09 Mar 2021 06:30) (99/59 - 111/68)  RR: 18 (09 Mar 2021 06:30) (18 - 19)  SpO2: 90% (09 Mar 2021 06:30) (88% - 95%)    Gen: lying in bed, jaundiced, in no acute distress  HEENT: normocephalic/atraumatic, no conjunctival pallor, + scleral icterus, no oral thrush/mucosal bleeding/mucositis  Neck: supple, no masses  Cardiovascular: irregular, nl S1S2, no murmurs  Respiratory: on NC 2L, crackles in b/l lower lungs   Gastrointestinal: BS+, soft, NT/ND, no masses, no splenomegaly, no hepatomegaly, no evidence for ascites  Extremities: no edema, no calf tenderness  Neurological: AAOx3, no focal deficits  Skin: jaundice   Lymph Nodes: no cervical/supraclavicular LAD  Musculoskeletal:  full ROM  Psychiatric:  mood stable      Labs:                                   10.9   4.04  )-----------( 146      ( 09 Mar 2021 05:52 )             34.5     03-09    137  |  102  |  22  ----------------------------<  108<H>  4.4   |  28  |  0.96    Ca    9.2      09 Mar 2021 05:52  Phos  3.4     03-09  Mg     2.0     03-09    TPro  6.8  /  Alb  4.0  /  TBili  2.8<H>  /  DBili  0.7<H>  /  AST  56<H>  /  ALT  30  /  AlkPhos  64  03-09    Bilirubin - Total and Direct in AM (03.09.21 @ 05:52)   Bilirubin Direct, Serum: 0.7 mg/dL   Bilirubin Total, Serum: 2.8 mg/dL   Indirect Reacting Bilirubin: 2.0 mg/dL   Lactate Dehydrogenase, Serum in AM (03.09.21 @ 05:52)   Lactate Dehydrogenase, Serum: 457 U/L   Haptoglobin, Serum in AM (03.09.21 @ 05:52)   Haptoglobin, Serum: <10 mg/dL   Folate, Serum (03.08.21 @ 13:04)   Folate, Serum: 19.1 ng/mL   Vitamin B12, Serum (03.08.21 @ 13:04)   Vitamin B12, Serum: 683 pg/mL   Direct Bruno Profile (03.06.21 @ 15:37)   Direct Bruno Poly: Negative   Reticulocyte Count (03.06.21 @ 07:19)   Reticulocyte Percent: 7.3 %   Absolute Reticulocytes: 189.4 K/uL   Cold Agglutinin Titer, Serum (03.05.21 @ 21:23)   Cold Agglutinin Titer, Serum: Positive: patient has a cold agglutinin titer of 4 Iron with Total Binding Capacity (03.05.21 @ 20:38)   Iron - Total Binding Capacity.: 331 ug/dL   % Saturation, Iron: 33 %   Iron Total, Serum: 109 ug/dL   Unsaturated Iron Binding Capacity: 222 ug/dL   Ferritin, Serum (03.05.21 @ 20:38)   Ferritin, Serum: 186 ng/mL     Imaging Studies:    < from: CT Chest, Abdomen and Pelvis w/ IV Cont (03.05.21 @ 13:51) >  IMPRESSION:    1.  No pulmonary embolism.  2.  Bilateral bronchiectasis and diffuse subpleural reticular opacities with basilar predominance are suggestive of nonspecific interstitial lung disease (pattern favors UIP or fibrotic NSIP)  3.  Diffuse parenchymal mosaic attenuation most likely air trapping.  4.  Mild aneurysmal dilatation aortic root, mid ascending thoracic aorta, aortic arch, and proximal descending thoracic aorta.  5.  Mildly dilated main pulmonary artery, nonspecific, can be seen in pulmonary artery hypertension.  6.  Colonic diverticulosis.  7.  Borderline splenic enlargement, nonspecific. Recommend clinical correlation.  8.  Mildly enlarged prostate. Recommend PSA correlation.    < end of copied text >    `

## 2021-03-09 NOTE — CONSULT NOTE ADULT - ASSESSMENT
75M w/ PMH of afib (on coumadin, toprol), ORLANDO (noncompliant with CPAP) admitted for hemolytic anemia and painless jaundice. Pulmonology consulted for radiographic findings of ILD.     75M w/ PMH of afib (on coumadin, toprol), ORLANDO (noncompliant with CPAP) admitted for hemolytic anemia and painless jaundice. Pulmonology consulted for hypoxia and radiographic findings of ILD.    #R/o ILD  - CT scan demonstrates b/l bronchiectasis with diffuse subpleural and basilar reticular opacities suggestive of interstitial lung disease  75M w/ PMH of afib (on coumadin, toprol), ORLANDO (noncompliant with CPAP) admitted for hemolytic anemia and painless jaundice. Pulmonology consulted for hypoxia and radiographic findings of ILD.    #R/o ILD  Pt with chronic dyspnea x10 yrs, dry cough. CT scan demonstrates b/l bronchiectasis with diffuse subpleural and basilar reticular opacities suggestive of interstitial lung disease. 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 Ddx includes idiopathic ILD vs pneumoconiosis  - please obtain ESR/CRP, C3/C4, YVONNE, anti-RNP, anti-Ro/La, Scl-70, RF/anti-CCP, ANCA, anti-Melba        Final recommendations pending discussion with attending.     75M w/ PMH of afib (on coumadin, toprol), ORLANDO (noncompliant with CPAP) admitted for hemolytic anemia and painless jaundice. Pulmonology consulted for hypoxia and radiographic findings of ILD.    #R/o ILD  Pt with chronic dyspnea x10 yrs, dry cough p/w hypoxia + hemolytic anemia. 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 CT scan demonstrates b/l bronchiectasis with diffuse subpleural and basilar reticular opacities suggestive of interstitial lung disease. 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 Ddx includes idiopathic ILD vs pneumoconiosis  - please obtain ESR/CRP, C3/C4, YVONNE, anti-RNP, anti-Ro/La, Scl-70, RF/anti-CCP, ANCA, myositis panel including anti-Melba, hypersensitivity panel  - please obtain ABG while pt on RA  - will attempt to obtain further collateral from PMD Dr. Suad Ureña    Discussed with attending. Please view additional changes as below.     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 Pulmonology consulted for hypoxia and radiographic findings of ILD.    #R/o ILD  Pt with chronic dyspnea x10 yrs, dry cough p/w hypoxia + hemolytic anemia. 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 CT scan demonstrates b/l bronchiectasis with diffuse subpleural and basilar reticular opacities suggestive of interstitial lung disease. 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 Ddx includes idiopathic ILD vs pneumoconiosis  - please obtain ESR/CRP, C3/C4, YVONNE, anti-RNP, anti-Ro/La, Scl-70, RF/anti-CCP, ANCA, myositis panel including anti-Melba, hypersensitivity panel  - please obtain ABG while pt on RA  - will attempt to obtain further collateral from PMD Dr. Suad Ureña    #Hypoxia  Likely related to lung pathology as above vs underlying PFO with mild shunt reversal in setting of worsening lung pathology  - recs as above  - rest of management per primary team    Discussed with attending. Please view additional changes as below.     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 Pulmonology consulted for hypoxia and radiographic findings of ILD.    #R/o ILD  Pt with chronic dyspnea x10 yrs, dry cough p/w hypoxia + hemolytic anemia. 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 CT scan demonstrates b/l bronchiectasis with diffuse subpleural and basilar reticular opacities suggestive of interstitial lung disease. 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 Ddx includes idiopathic ILD vs pneumoconiosis  - please obtain ESR/CRP, C3/C4, YVONNE, anti-RNP, anti-Ro/La, Scl-70, RF/anti-CCP, ANCA, myositis panel including anti-Melba, hypersensitivity panel  - please obtain ABG while pt on RA  - will attempt to obtain further collateral from PMD Dr. Suad Ureña    Discussed with attending. Please view additional changes as below.

## 2021-03-09 NOTE — PROGRESS NOTE ADULT - ASSESSMENT
75M w/ PMH of afib (on coumadin), ORLANDO (noncompliant with CPAP), who presents to [facility] ED after being sent in by his surgeon Dr. Katarina Mccallum for workup of jaundice. Patient is being admitted for further management of macrocytic anemia of unknown origin with concern for hemolysis. Hematology consulted for further management.     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 Direct alka is negative, which does not completely exclude a possibility of cold agglutinin disease, however it is atypical.   - Iron studies normal. B12 and folate WNL  - HIV, Hep C and Hep B negative   - CT a/p with no evidence of LAD, no hepatomegaly. Borderline splenic enlargement 13.1 cm.   - SPEP normal and serum immunofixation with no monoclonal band identified, mycoplasma ab positive for IgG indicative of past infection, EBV serologies consistent with past infection. Normal cryoglobulins. Pending flow cytometry, ESR, anti-Ds DNA and YVONNE.   - Avoid cold exposure, IV fluid and blood products should be warmed to appropriate temperature before infusion. Warm blankets should be provided as needed.     Hypoxia refractory to O2 supplementation   - Co-ox to assess for methemoglobinemia showed normal methemoglobin level.   - CTA negative for PE   - Normal Hemoglobin electrophoresis  - TTE with HFrEF, PFO, evidence of pulmonary hypertension   - Pulmonary consulted.     Discussed with Dr. Vargas

## 2021-03-09 NOTE — DIETITIAN INITIAL EVALUATION ADULT. - OTHER INFO
74y/o male with history of AFib,ORLANDO admitted with painless jaundice/SOB and macrocytic anemia. Eating 100% of trays with no complaints .No N/V/D or pain. Skin intac t.No BM since last Thursday. On senna and miralax.Patient denied any weight changes,114% of IBW.RD encouraged present diet order and briefly reviewed DASH diet.

## 2021-03-09 NOTE — PROGRESS NOTE ADULT - PROBLEM SELECTOR PLAN 4
Non-compliant with CPAP. Hx notable for recent SOB/VIGIL. On admission, hypoxic to mid 80s on room air requiring O2 supplementation with NC/Venturi mask.  - Currently satting 88-92% on RA - 6L  - Wean O2 requirements as tolerated

## 2021-03-09 NOTE — DIETITIAN INITIAL EVALUATION ADULT. - PROBLEM SELECTOR PLAN 3
Non-compliant with CPAP. Hx notable for recent SOB/VIGIL. On admission, hypoxic to mid 80s on room air requiring O2 supplementation with NC/Venturi mask.  - Currently satting 88-91% on 4L NC  - Wean O2 requirements as tolerated

## 2021-03-09 NOTE — CONSULT NOTE ADULT - ATTENDING COMMENTS
called for dissociation between Pao2 and sats.  resting comfortably, chest clear, cor ireg, ab soft non tender no edema. Labs as above, high pao2 low sat, met hgb normal    A- pao2-sat dissociation/ increased paz/macrocytosis/elevated tumor markers    Suggest:  repeat coximetry  hemoglobin electrophoresis  check b12, folate  repeat paz fractionate/ldh  heme to see
Initial attending contact date  3/9/21    . See fellow note written above for details. I reviewed the fellow documentation. I have personally seen and examined this patient. I reviewed vitals, labs, medications, cardiac studies, and additional imaging. I agree with the above fellow's findings and plans as written above
Incidental finding of ILD on CT during admission for workup of painless jaundice. Also with hypoxemia requiring nasal cannula supplementation. 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 Needs further serologic workup as above. Will also need an outpt sleep study, PFTs, 6MWT. Add PPI. Pulm team is working on obtaining prior imaging for comparison and to assess for progression of disease.

## 2021-03-09 NOTE — CONSULT NOTE ADULT - SUBJECTIVE AND OBJECTIVE BOX
HPI:  75 M w/ hx of persistent chronic atrial fibrillation, ORLANDO, Interstitial Lung Disease, admitted to medical service for evaluation of worsening dyspnea w/ exertion, fatigue, and weakness. Patient also noted to be jaundiced and being worked up for hemolytic anemia. Cardiology consulted for PFO noted on transthoracic echocardiogram. Patient was seen and examined, for which he states he is doing better from a respiratory standpoint. He is sitting up in bed, eating lunch. He reports non-compliance with his CPAP at home. He reports compliance with his home medications, which include Lasix 20mg PO daily, Metoprolol Tartrate 25mg daily, and Coumadin 2.5 qhs. Other than his exertional dyspnea, he reports no syncopal episodes, lightheadedness, chest pain, abdominal pain, or lower extremity swelling.       CARDIAC DIAGNOSTIC TESTING:      ECG: rate controlled atrial fibrillation     ECHO FINDINGS:  TTE Echo Complete w/o Contrast w/ Doppler (03.09.21 @ 09:44) >  CONCLUSIONS:   1. Biatrial enlargement.   2. Injection of agitated saline via a peripheral vein reveals bubbles in the left heart within 3 heart beats, most consistent with a patent foramen ovale.   3. Mild-to-moderate aortic regurgitation.   4. There is mild tricuspid regurgitation. Pulmonary artery systolic pressure (estimated using the tricuspid regurgitant gradient and an estimate of right atrial pressure) is 44 mmHg.   5. The right ventricle is dilated. Right ventricular systolic function is normal.   6. The left ventricle cavity size is mildly dilated . There is mild concentric left ventricular hypertrophy. Left ventricular systolic function is mildly reduced with a calculated ejection fraction of 45% with global hypokinesis.   7. No pericardial effusion.   8. The aortic root is dilated measuring 4.40 cm.    PAST MEDICAL & SURGICAL HISTORY:  Sleep apnea  non-compliant with BiPaP    Afib    No significant past surgical history        HOME MEDICATIONS  T(C): 36.5 (03-09-21 @ 06:30), Max: 36.5 (03-09-21 @ 06:30)  HR: 76 (03-09-21 @ 06:30) (70 - 90)  BP: 111/68 (03-09-21 @ 06:30) (99/59 - 111/68)  RR: 18 (03-09-21 @ 06:30) (18 - 19)  SpO2: 90% (03-09-21 @ 06:30) (88% - 95%)    MEDICATIONS  (STANDING):  enoxaparin Injectable 90 milliGRAM(s) SubCutaneous every 12 hours  folic acid 3 milliGRAM(s) Oral every 24 hours  melatonin 5 milliGRAM(s) Oral at bedtime  metoprolol tartrate 12.5 milliGRAM(s) Oral two times a day  polyethylene glycol 3350 17 Gram(s) Oral daily  senna 1 Tablet(s) Oral at bedtime    MEDICATIONS  (PRN):  acetaminophen   Tablet .. 650 milliGRAM(s) Oral every 6 hours PRN Temp greater or equal to 38C (100.4F), Mild Pain (1 - 3)      FAMILY HISTORY:      SOCIAL HISTORY:  - Smoking: no  - Alcohol: no  - Recreational drug use: no    REVIEW OF SYSTEMS:  CONSTITUTIONAL: No fever, weight loss, or fatigue  EYES: No eye pain, visual disturbances, or discharge  ENMT:  No difficulty hearing, tinnitus, vertigo; No sinus or throat pain  NECK: No pain or stiffness  RESPIRATORY: exertional dyspnea   CARDIOVASCULAR: No chest pain, palpitations, passing out, dizziness, or leg swelling  GASTROINTESTINAL: No abdominal or epigastric pain. No nausea, vomiting, or hematemesis; No diarrhea or constipation. No melena or hematochezia.  GENITOURINARY: No dysuria, frequency, hematuria, or incontinence  NEUROLOGICAL: No headaches, memory loss, loss of strength, numbness, or tremors  SKIN: No itching, burning, rashes, or lesions   LYMPH Nodes: No enlarged glands  ENDOCRINE: No heat or cold intolerance; No hair loss  MUSCULOSKELETAL: No joint pain or swelling; No muscle, back, or extremity pain  PSYCHIATRIC: No depression, anxiety, mood swings, or difficulty sleeping  HEME/LYMPH: No easy bruising, or bleeding gums  ALLERY AND IMMUNOLOGIC: No hives or eczema    Vitals past 24 Hours: T(C): 36.5 (03-09-21 @ 06:30), Max: 36.5 (03-09-21 @ 06:30)  HR: 76 (03-09-21 @ 06:30) (70 - 90)  BP: 111/68 (03-09-21 @ 06:30) (99/59 - 111/68)  RR: 18 (03-09-21 @ 06:30) (18 - 19)  SpO2: 90% (03-09-21 @ 06:30) (88% - 95%)	    PHYSICAL EXAM:  GEN: No acute respiratory distress, appears stated age	  HEENT: Anicteric sclera, PERRL, EOMI, no oropharyngeal erythema or discharge, trachea midline  Lymphatic: No cervical lymphadenopathy  Cardiovascular: S1 S2, No JVD, No murmurs, PMI  Respiratory: faint crackles bilaterally   Gastrointestinal:  BS+, soft, non distended, non tender, no HSM  Skin: No rashes, No ecchymoses, No cyanosis  Neurologic: Non-focal, AAO x 3, strength grossly normal in all extremeties  Extremities: Normal range of motion, No clubbing, cyanosis or edema  Vascular: Radial 2+, DP 2+      I&O's Detail    08 Mar 2021 07:01  -  09 Mar 2021 07:00  --------------------------------------------------------  IN:  Total IN: 0 mL    OUT:    Voided (mL): 900 mL  Total OUT: 900 mL    Total NET: -900 mL          LABS:                        10.9   4.04  )-----------( 146      ( 09 Mar 2021 05:52 )             34.5     03-09    137  |  102  |  22  ----------------------------<  108<H>  4.4   |  28  |  0.96    Ca    9.2      09 Mar 2021 05:52  Phos  3.4     03-09  Mg     2.0     03-09    TPro  6.8  /  Alb  4.0  /  TBili  2.8<H>  /  DBili  0.7<H>  /  AST  56<H>  /  ALT  30  /  AlkPhos  64  03-09            I&O's Summary    08 Mar 2021 07:01  -  09 Mar 2021 07:00  --------------------------------------------------------  IN: 0 mL / OUT: 900 mL / NET: -900 mL        RADIOLOGY & ADDITIONAL STUDIES: HPI:  75 M w/ hx of persistent chronic atrial fibrillation, [place], Interstitial Lung Disease, admitted to medical service for evaluation of worsening dyspnea w/ exertion, fatigue, and weakness. Patient also noted to be jaundiced and being worked up for hemolytic anemia. Cardiology consulted for PFO noted on transthoracic echocardiogram. Patient was seen and examined, for which he states he is doing better from a respiratory standpoint. He is sitting up in bed, eating lunch. He reports non-compliance with his CPAP at home. He reports compliance with his home medications, which include Lasix 20mg PO daily, Metoprolol Tartrate 25mg daily, and Coumadin 2.5 qhs. Other than his exertional dyspnea, he reports no syncopal episodes, lightheadedness, chest pain, abdominal pain, or lower extremity swelling.       CARDIAC DIAGNOSTIC TESTING:      ECG: rate controlled atrial fibrillation     ECHO FINDINGS:  TTE Echo Complete w/o Contrast w/ Doppler (03.09.21 @ 09:44) >  CONCLUSIONS:   1. Biatrial enlargement.   2. Injection of agitated saline via a peripheral vein reveals bubbles in the left heart within 3 heart beats, most consistent with a patent foramen ovale.   3. Mild-to-moderate aortic regurgitation.   4. There is mild tricuspid regurgitation. Pulmonary artery systolic pressure (estimated using the tricuspid regurgitant gradient and an estimate of right atrial pressure) is 44 mmHg.   5. The right ventricle is dilated. Right ventricular systolic function is normal.   6. The left ventricle cavity size is mildly dilated . There is mild concentric left ventricular hypertrophy. Left ventricular systolic function is mildly reduced with a calculated ejection fraction of 45% with global hypokinesis.   7. No pericardial effusion.   8. The aortic root is dilated measuring 4.40 cm.    PAST MEDICAL & SURGICAL HISTORY:  Sleep apnea  non-compliant with BiPaP    Afib    No significant past surgical history        HOME MEDICATIONS  T(C): 36.5 (03-09-21 @ 06:30), Max: 36.5 (03-09-21 @ 06:30)  HR: 76 (03-09-21 @ 06:30) (70 - 90)  BP: 111/68 (03-09-21 @ 06:30) (99/59 - 111/68)  RR: 18 (03-09-21 @ 06:30) (18 - 19)  SpO2: 90% (03-09-21 @ 06:30) (88% - 95%)    MEDICATIONS  (STANDING):  enoxaparin Injectable 90 milliGRAM(s) SubCutaneous every 12 hours  folic acid 3 milliGRAM(s) Oral every 24 hours  melatonin 5 milliGRAM(s) Oral at bedtime  metoprolol tartrate 12.5 milliGRAM(s) Oral two times a day  polyethylene glycol 3350 17 Gram(s) Oral daily  senna 1 Tablet(s) Oral at bedtime    MEDICATIONS  (PRN):  acetaminophen   Tablet .. 650 milliGRAM(s) Oral every 6 hours PRN Temp greater or equal to 38C (100.4F), Mild Pain (1 - 3)      FAMILY HISTORY:      SOCIAL HISTORY:  - Smoking: no  - Alcohol: no  - Recreational drug use: no    REVIEW OF SYSTEMS:  CONSTITUTIONAL: No fever, weight loss, or fatigue  EYES: No eye pain, visual disturbances, or discharge  ENMT:  No difficulty hearing, tinnitus, vertigo; No sinus or throat pain  NECK: No pain or stiffness  RESPIRATORY: exertional dyspnea   CARDIOVASCULAR: No chest pain, palpitations, passing out, dizziness, or leg swelling  GASTROINTESTINAL: No abdominal or epigastric pain. No nausea, vomiting, or hematemesis; No diarrhea or constipation. No melena or hematochezia.  GENITOURINARY: No dysuria, frequency, hematuria, or incontinence  NEUROLOGICAL: No headaches, memory loss, loss of strength, numbness, or tremors  SKIN: No itching, burning, rashes, or lesions   LYMPH Nodes: No enlarged glands  ENDOCRINE: No heat or cold intolerance; No hair loss  MUSCULOSKELETAL: No joint pain or swelling; No muscle, back, or extremity pain  PSYCHIATRIC: No depression, anxiety, mood swings, or difficulty sleeping  HEME/LYMPH: No easy bruising, or bleeding gums  ALLERY AND IMMUNOLOGIC: No hives or eczema    Vitals past 24 Hours: T(C): 36.5 (03-09-21 @ 06:30), Max: 36.5 (03-09-21 @ 06:30)  HR: 76 (03-09-21 @ 06:30) (70 - 90)  BP: 111/68 (03-09-21 @ 06:30) (99/59 - 111/68)  RR: 18 (03-09-21 @ 06:30) (18 - 19)  SpO2: 90% (03-09-21 @ 06:30) (88% - 95%)	    PHYSICAL EXAM:  GEN: No acute respiratory distress, appears stated age	  HEENT: Anicteric sclera, PERRL, EOMI, no oropharyngeal erythema or discharge, trachea midline  Lymphatic: No cervical lymphadenopathy  Cardiovascular: S1 S2, No JVD, No murmurs, PMI  Respiratory: Clear bilaterally without crackles, no accessory muscle use   Gastrointestinal:  BS+, soft, non distended, non tender, no HSM  Skin: No rashes, No ecchymoses, No cyanosis  Neurologic: Non-focal, AAO x 3, strength grossly normal in all extremeties  Extremities: Normal range of motion, No clubbing, cyanosis or edema  Vascular: Radial 2+, DP 2+      I&O's Detail    08 Mar 2021 07:01  -  09 Mar 2021 07:00  --------------------------------------------------------  IN:  Total IN: 0 mL    OUT:    Voided (mL): 900 mL  Total OUT: 900 mL    Total NET: -900 mL          LABS:                        10.9   4.04  )-----------( 146      ( 09 Mar 2021 05:52 )             34.5     03-09    137  |  102  |  22  ----------------------------<  108<H>  4.4   |  28  |  0.96    Ca    9.2      09 Mar 2021 05:52  Phos  3.4     03-09  Mg     2.0     03-09    TPro  6.8  /  Alb  4.0  /  TBili  2.8<H>  /  DBili  0.7<H>  /  AST  56<H>  /  ALT  30  /  AlkPhos  64  03-09            I&O's Summary    08 Mar 2021 07:01  -  09 Mar 2021 07:00  --------------------------------------------------------  IN: 0 mL / OUT: 900 mL / NET: -900 mL        RADIOLOGY & ADDITIONAL STUDIES:

## 2021-03-09 NOTE — PROGRESS NOTE ADULT - PROBLEM SELECTOR PLAN 1
Schistocytes on smear. 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   - Daily hemolysis labs (haptoglobin, LDH, Bili) per heme/onc  - Cold Ag positive   - Hb electrophoresis wnl  - B12 folate wnl  - Prior EBV and mycoplasma infections per lab work.   - Pending remainder of heme/onc workup ( cryoglobulin, flow cytometry)   - Heme/onc consulted, recs appreciated -- sending flow cytometry today.

## 2021-03-09 NOTE — PROGRESS NOTE ADULT - SUBJECTIVE AND OBJECTIVE BOX
YOVANY WARD, 75y, Male  MRN-[id number]  Patient is a 75y old  Male who presents with a chief complaint of Painless jaundice, SOB (08 Mar 2021 12:54)      OVERNIGHT EVENTS:     SUBJECTIVE:    12 Point ROS Negative unless noted otherwise above.  -------------------------------------------------------------------------------  VITAL SIGNS:  Vital Signs Last 24 Hrs  T(C): 36.5 (09 Mar 2021 06:30), Max: 36.5 (09 Mar 2021 06:30)  T(F): 97.7 (09 Mar 2021 06:30), Max: 97.7 (09 Mar 2021 06:30)  HR: 76 (09 Mar 2021 06:30) (70 - 90)  BP: 111/68 (09 Mar 2021 06:30) (99/59 - 111/68)  BP(mean): --  RR: 18 (09 Mar 2021 06:30) (18 - 19)  SpO2: 90% (09 Mar 2021 06:30) (88% - 95%)  I&O's Summary    08 Mar 2021 07:01  -  09 Mar 2021 07:00  --------------------------------------------------------  IN: 0 mL / OUT: 900 mL / NET: -900 mL        PHYSICAL EXAM:    General: NAD, well developed  HEENT: NC/AT; EOMI, PERRLA, anicteric sclera; moist mucosal membranes.  Neck: supple, trachea midline  Cardiovascular: RRR, +S1/S2; NO M/R/G  Respiratory: CTA B/L; no W/R/R  Gastrointestinal: soft, NT/ND; +BSx4  Extremities: WWP; no edema or cyanosis  Vascular: 2+ radial, DP/PT pulses B/L  Neurological: AAOx3; no focal deficits    ALLERGIES:  Allergies    No Known Allergies    Intolerances        MEDICATIONS:  MEDICATIONS  (STANDING):  enoxaparin Injectable 90 milliGRAM(s) SubCutaneous every 12 hours  folic acid 3 milliGRAM(s) Oral every 24 hours  melatonin 5 milliGRAM(s) Oral at bedtime  metoprolol tartrate 12.5 milliGRAM(s) Oral two times a day  polyethylene glycol 3350 17 Gram(s) Oral daily  senna 1 Tablet(s) Oral at bedtime    MEDICATIONS  (PRN):  acetaminophen   Tablet .. 650 milliGRAM(s) Oral every 6 hours PRN Temp greater or equal to 38C (100.4F), Mild Pain (1 - 3)      -------------------------------------------------------------------------------  LABS:                        10.9   4.04  )-----------( 146      ( 09 Mar 2021 05:52 )             34.5     03-09    137  |  102  |  22  ----------------------------<  108<H>  4.4   |  28  |  0.96    Ca    9.2      09 Mar 2021 05:52  Phos  3.4     03-09  Mg     2.0     03-09    TPro  6.8  /  Alb  4.0  /  TBili  2.8<H>  /  DBili  0.7<H>  /  AST  56<H>  /  ALT  30  /  AlkPhos  64  03-09    LIVER FUNCTIONS - ( 09 Mar 2021 05:52 )  Alb: 4.0 g/dL / Pro: 6.8 g/dL / ALK PHOS: 64 U/L / ALT: 30 U/L / AST: 56 U/L / GGT: x               CAPILLARY BLOOD GLUCOSE          COVID-19 PCR: NotDetec (05 Mar 2021 11:27)      RADIOLOGY & ADDITIONAL TESTS: Reviewed.       YOVANY WARD, 75y, Male  MRN-[id number]  Patient is a 75y old  Male who presents with a chief complaint of Painless jaundice, SOB (08 Mar 2021 12:54)      OVERNIGHT EVENTS: ELIZABET.    SUBJECTIVE: Pt seen and examined at bedside this AM. Pt states no acute events while sleeping, and that he worked with physical therapy yesterday stating that he was mildly SOB, but improved from when he previously has tried walking.    12 Point ROS Negative unless noted otherwise above.  -------------------------------------------------------------------------------  VITAL SIGNS:  Vital Signs Last 24 Hrs  T(C): 36.5 (09 Mar 2021 06:30), Max: 36.5 (09 Mar 2021 06:30)  T(F): 97.7 (09 Mar 2021 06:30), Max: 97.7 (09 Mar 2021 06:30)  HR: 76 (09 Mar 2021 06:30) (70 - 90)  BP: 111/68 (09 Mar 2021 06:30) (99/59 - 111/68)  BP(mean): --  RR: 18 (09 Mar 2021 06:30) (18 - 19)  SpO2: 90% (09 Mar 2021 06:30) (88% - 95%)  I&O's Summary    08 Mar 2021 07:01  -  09 Mar 2021 07:00  --------------------------------------------------------  IN: 0 mL / OUT: 900 mL / NET: -900 mL        PHYSICAL EXAM:    General: NAD, well developed  HEENT: NC/AT; EOMI, PERRLA, mild icteric sclera; moist mucosal membranes.  Neck: supple, trachea midline  Cardiovascular: RRR, +S1/S2; NO M/R/G  Respiratory: CTA B/L; no W/R/R  Gastrointestinal: soft, NT/ND; +BSx4  Extremities: WWP; no edema or cyanosis.   Vascular: 2+ radial, DP/PT pulses B/L  Neurological: AAOx3; no focal deficits    ALLERGIES:  Allergies    No Known Allergies    Intolerances        MEDICATIONS:  MEDICATIONS  (STANDING):  enoxaparin Injectable 90 milliGRAM(s) SubCutaneous every 12 hours  folic acid 3 milliGRAM(s) Oral every 24 hours  melatonin 5 milliGRAM(s) Oral at bedtime  metoprolol tartrate 12.5 milliGRAM(s) Oral two times a day  polyethylene glycol 3350 17 Gram(s) Oral daily  senna 1 Tablet(s) Oral at bedtime    MEDICATIONS  (PRN):  acetaminophen   Tablet .. 650 milliGRAM(s) Oral every 6 hours PRN Temp greater or equal to 38C (100.4F), Mild Pain (1 - 3)      -------------------------------------------------------------------------------  LABS:                        10.9   4.04  )-----------( 146      ( 09 Mar 2021 05:52 )             34.5     03-09    137  |  102  |  22  ----------------------------<  108<H>  4.4   |  28  |  0.96    Ca    9.2      09 Mar 2021 05:52  Phos  3.4     03-09  Mg     2.0     03-09    TPro  6.8  /  Alb  4.0  /  TBili  2.8<H>  /  DBili  0.7<H>  /  AST  56<H>  /  ALT  30  /  AlkPhos  64  03-09    LIVER FUNCTIONS - ( 09 Mar 2021 05:52 )  Alb: 4.0 g/dL / Pro: 6.8 g/dL / ALK PHOS: 64 U/L / ALT: 30 U/L / AST: 56 U/L / GGT: x               CAPILLARY BLOOD GLUCOSE          COVID-19 PCR: NotDetec (05 Mar 2021 11:27)      RADIOLOGY & ADDITIONAL TESTS: Reviewed.

## 2021-03-10 ENCOUNTER — TRANSCRIPTION ENCOUNTER (OUTPATIENT)
Age: 76
End: 2021-03-10

## 2021-03-10 VITALS
HEART RATE: 94 BPM | RESPIRATION RATE: 18 BRPM | DIASTOLIC BLOOD PRESSURE: 71 MMHG | OXYGEN SATURATION: 92 % | SYSTOLIC BLOOD PRESSURE: 108 MMHG | TEMPERATURE: 97 F

## 2021-03-10 PROBLEM — Z00.00 ENCOUNTER FOR PREVENTIVE HEALTH EXAMINATION: Status: ACTIVE | Noted: 2021-03-10

## 2021-03-10 PROBLEM — I48.91 UNSPECIFIED ATRIAL FIBRILLATION: Chronic | Status: ACTIVE | Noted: 2021-03-05

## 2021-03-10 PROBLEM — G47.30 SLEEP APNEA, UNSPECIFIED: Chronic | Status: ACTIVE | Noted: 2021-03-05

## 2021-03-10 LAB
ALBUMIN SERPL ELPH-MCNC: 4.1 G/DL — SIGNIFICANT CHANGE UP (ref 3.3–5)
ALP SERPL-CCNC: 63 U/L — SIGNIFICANT CHANGE UP (ref 40–120)
ALT FLD-CCNC: 46 U/L — HIGH (ref 10–45)
ANION GAP SERPL CALC-SCNC: 10 MMOL/L — SIGNIFICANT CHANGE UP (ref 5–17)
AST SERPL-CCNC: 67 U/L — HIGH (ref 10–40)
BILIRUB DIRECT SERPL-MCNC: 0.8 MG/DL — HIGH (ref 0–0.2)
BILIRUB INDIRECT FLD-MCNC: 1.8 MG/DL — HIGH (ref 0.2–1)
BILIRUB SERPL-MCNC: 2.6 MG/DL — HIGH (ref 0.2–1.2)
BILIRUB SERPL-MCNC: 2.6 MG/DL — HIGH (ref 0.2–1.2)
BUN SERPL-MCNC: 30 MG/DL — HIGH (ref 7–23)
CALCIUM SERPL-MCNC: 9.4 MG/DL — SIGNIFICANT CHANGE UP (ref 8.4–10.5)
CHLORIDE SERPL-SCNC: 101 MMOL/L — SIGNIFICANT CHANGE UP (ref 96–108)
CO2 SERPL-SCNC: 26 MMOL/L — SIGNIFICANT CHANGE UP (ref 22–31)
CREAT SERPL-MCNC: 0.96 MG/DL — SIGNIFICANT CHANGE UP (ref 0.5–1.3)
CRP SERPL-MCNC: 0.9 MG/L — SIGNIFICANT CHANGE UP (ref 0–4)
CULTURE RESULTS: SIGNIFICANT CHANGE UP
CULTURE RESULTS: SIGNIFICANT CHANGE UP
ERYTHROCYTE [SEDIMENTATION RATE] IN BLOOD: 10 MM/HR — SIGNIFICANT CHANGE UP
GLUCOSE SERPL-MCNC: 113 MG/DL — HIGH (ref 70–99)
HAPTOGLOB SERPL-MCNC: <10 MG/DL — LOW (ref 34–200)
HCT VFR BLD CALC: 35.2 % — LOW (ref 39–50)
HGB BLD-MCNC: 11.2 G/DL — LOW (ref 13–17)
LDH SERPL L TO P-CCNC: 469 U/L — HIGH (ref 50–242)
MAGNESIUM SERPL-MCNC: 1.8 MG/DL — SIGNIFICANT CHANGE UP (ref 1.6–2.6)
MCHC RBC-ENTMCNC: 31.8 GM/DL — LOW (ref 32–36)
MCHC RBC-ENTMCNC: 39 PG — HIGH (ref 27–34)
MCV RBC AUTO: 122.6 FL — HIGH (ref 80–100)
NRBC # BLD: 0 /100 WBCS — SIGNIFICANT CHANGE UP (ref 0–0)
PHOSPHATE SERPL-MCNC: 3.2 MG/DL — SIGNIFICANT CHANGE UP (ref 2.5–4.5)
PLATELET # BLD AUTO: 153 K/UL — SIGNIFICANT CHANGE UP (ref 150–400)
POTASSIUM SERPL-MCNC: 4.5 MMOL/L — SIGNIFICANT CHANGE UP (ref 3.5–5.3)
POTASSIUM SERPL-SCNC: 4.5 MMOL/L — SIGNIFICANT CHANGE UP (ref 3.5–5.3)
PROT SERPL-MCNC: 7.1 G/DL — SIGNIFICANT CHANGE UP (ref 6–8.3)
RBC # BLD: 2.87 M/UL — LOW (ref 4.2–5.8)
RBC # FLD: 12.5 % — SIGNIFICANT CHANGE UP (ref 10.3–14.5)
RHEUMATOID FACT SERPL-ACNC: <10 IU/ML — SIGNIFICANT CHANGE UP (ref 0–13)
SODIUM SERPL-SCNC: 137 MMOL/L — SIGNIFICANT CHANGE UP (ref 135–145)
SPECIMEN SOURCE: SIGNIFICANT CHANGE UP
SPECIMEN SOURCE: SIGNIFICANT CHANGE UP
WBC # BLD: 4.02 K/UL — SIGNIFICANT CHANGE UP (ref 3.8–10.5)
WBC # FLD AUTO: 4.02 K/UL — SIGNIFICANT CHANGE UP (ref 3.8–10.5)

## 2021-03-10 PROCEDURE — 82803 BLOOD GASES ANY COMBINATION: CPT

## 2021-03-10 PROCEDURE — 85018 HEMOGLOBIN: CPT

## 2021-03-10 PROCEDURE — 84100 ASSAY OF PHOSPHORUS: CPT

## 2021-03-10 PROCEDURE — 83615 LACTATE (LD) (LDH) ENZYME: CPT

## 2021-03-10 PROCEDURE — 71275 CT ANGIOGRAPHY CHEST: CPT

## 2021-03-10 PROCEDURE — 85025 COMPLETE CBC W/AUTO DIFF WBC: CPT

## 2021-03-10 PROCEDURE — 85610 PROTHROMBIN TIME: CPT

## 2021-03-10 PROCEDURE — 93306 TTE W/DOPPLER COMPLETE: CPT

## 2021-03-10 PROCEDURE — 83520 IMMUNOASSAY QUANT NOS NONAB: CPT

## 2021-03-10 PROCEDURE — 86901 BLOOD TYPING SEROLOGIC RH(D): CPT

## 2021-03-10 PROCEDURE — 86157 COLD AGGLUTININ TITER: CPT

## 2021-03-10 PROCEDURE — 84155 ASSAY OF PROTEIN SERUM: CPT

## 2021-03-10 PROCEDURE — 83020 HEMOGLOBIN ELECTROPHORESIS: CPT

## 2021-03-10 PROCEDURE — 99239 HOSP IP/OBS DSCHRG MGMT >30: CPT | Mod: GC

## 2021-03-10 PROCEDURE — 82595 ASSAY OF CRYOGLOBULIN: CPT

## 2021-03-10 PROCEDURE — 86160 COMPLEMENT ANTIGEN: CPT

## 2021-03-10 PROCEDURE — 80053 COMPREHEN METABOLIC PANEL: CPT

## 2021-03-10 PROCEDURE — 86663 EPSTEIN-BARR ANTIBODY: CPT

## 2021-03-10 PROCEDURE — 97161 PT EVAL LOW COMPLEX 20 MIN: CPT

## 2021-03-10 PROCEDURE — 97116 GAIT TRAINING THERAPY: CPT

## 2021-03-10 PROCEDURE — 83540 ASSAY OF IRON: CPT

## 2021-03-10 PROCEDURE — 82248 BILIRUBIN DIRECT: CPT

## 2021-03-10 PROCEDURE — 86900 BLOOD TYPING SEROLOGIC ABO: CPT

## 2021-03-10 PROCEDURE — 86431 RHEUMATOID FACTOR QUANT: CPT

## 2021-03-10 PROCEDURE — 84165 PROTEIN E-PHORESIS SERUM: CPT

## 2021-03-10 PROCEDURE — 86664 EPSTEIN-BARR NUCLEAR ANTIGEN: CPT

## 2021-03-10 PROCEDURE — 84484 ASSAY OF TROPONIN QUANT: CPT

## 2021-03-10 PROCEDURE — 36415 COLL VENOUS BLD VENIPUNCTURE: CPT

## 2021-03-10 PROCEDURE — 86255 FLUORESCENT ANTIBODY SCREEN: CPT

## 2021-03-10 PROCEDURE — 86001 ALLERGEN SPECIFIC IGG: CPT

## 2021-03-10 PROCEDURE — 84295 ASSAY OF SERUM SODIUM: CPT

## 2021-03-10 PROCEDURE — 74177 CT ABD & PELVIS W/CONTRAST: CPT

## 2021-03-10 PROCEDURE — 86803 HEPATITIS C AB TEST: CPT

## 2021-03-10 PROCEDURE — 80307 DRUG TEST PRSMV CHEM ANLYZR: CPT

## 2021-03-10 PROCEDURE — 86235 NUCLEAR ANTIGEN ANTIBODY: CPT

## 2021-03-10 PROCEDURE — 83550 IRON BINDING TEST: CPT

## 2021-03-10 PROCEDURE — 83605 ASSAY OF LACTIC ACID: CPT

## 2021-03-10 PROCEDURE — 85652 RBC SED RATE AUTOMATED: CPT

## 2021-03-10 PROCEDURE — 82330 ASSAY OF CALCIUM: CPT

## 2021-03-10 PROCEDURE — 87086 URINE CULTURE/COLONY COUNT: CPT

## 2021-03-10 PROCEDURE — 93005 ELECTROCARDIOGRAM TRACING: CPT

## 2021-03-10 PROCEDURE — 83880 ASSAY OF NATRIURETIC PEPTIDE: CPT

## 2021-03-10 PROCEDURE — 85379 FIBRIN DEGRADATION QUANT: CPT

## 2021-03-10 PROCEDURE — 83735 ASSAY OF MAGNESIUM: CPT

## 2021-03-10 PROCEDURE — 84132 ASSAY OF SERUM POTASSIUM: CPT

## 2021-03-10 PROCEDURE — 85730 THROMBOPLASTIN TIME PARTIAL: CPT

## 2021-03-10 PROCEDURE — 86038 ANTINUCLEAR ANTIBODIES: CPT

## 2021-03-10 PROCEDURE — 86665 EPSTEIN-BARR CAPSID VCA: CPT

## 2021-03-10 PROCEDURE — 84145 PROCALCITONIN (PCT): CPT

## 2021-03-10 PROCEDURE — 99233 SBSQ HOSP IP/OBS HIGH 50: CPT | Mod: GC

## 2021-03-10 PROCEDURE — 86738 MYCOPLASMA ANTIBODY: CPT

## 2021-03-10 PROCEDURE — 80074 ACUTE HEPATITIS PANEL: CPT

## 2021-03-10 PROCEDURE — 85045 AUTOMATED RETICULOCYTE COUNT: CPT

## 2021-03-10 PROCEDURE — 86880 COOMBS TEST DIRECT: CPT

## 2021-03-10 PROCEDURE — 82247 BILIRUBIN TOTAL: CPT

## 2021-03-10 PROCEDURE — 86334 IMMUNOFIX E-PHORESIS SERUM: CPT

## 2021-03-10 PROCEDURE — 83010 ASSAY OF HAPTOGLOBIN QUANT: CPT

## 2021-03-10 PROCEDURE — 87040 BLOOD CULTURE FOR BACTERIA: CPT

## 2021-03-10 PROCEDURE — 86850 RBC ANTIBODY SCREEN: CPT

## 2021-03-10 PROCEDURE — U0003: CPT

## 2021-03-10 PROCEDURE — 71045 X-RAY EXAM CHEST 1 VIEW: CPT

## 2021-03-10 PROCEDURE — 82728 ASSAY OF FERRITIN: CPT

## 2021-03-10 PROCEDURE — 82607 VITAMIN B-12: CPT

## 2021-03-10 PROCEDURE — U0005: CPT

## 2021-03-10 PROCEDURE — 82746 ASSAY OF FOLIC ACID SERUM: CPT

## 2021-03-10 PROCEDURE — 99291 CRITICAL CARE FIRST HOUR: CPT | Mod: 25

## 2021-03-10 PROCEDURE — 86140 C-REACTIVE PROTEIN: CPT

## 2021-03-10 PROCEDURE — 99292 CRITICAL CARE ADDL 30 MIN: CPT

## 2021-03-10 PROCEDURE — 86036 ANCA SCREEN EACH ANTIBODY: CPT

## 2021-03-10 PROCEDURE — 85027 COMPLETE CBC AUTOMATED: CPT

## 2021-03-10 PROCEDURE — 87389 HIV-1 AG W/HIV-1&-2 AB AG IA: CPT

## 2021-03-10 RX ORDER — FUROSEMIDE 40 MG
1 TABLET ORAL
Qty: 30 | Refills: 1
Start: 2021-03-10 | End: 2021-05-08

## 2021-03-10 RX ORDER — FUROSEMIDE 40 MG
1 TABLET ORAL
Qty: 0 | Refills: 0 | DISCHARGE

## 2021-03-10 RX ORDER — METOPROLOL TARTRATE 50 MG
1 TABLET ORAL
Qty: 30 | Refills: 0
Start: 2021-03-10 | End: 2021-04-08

## 2021-03-10 RX ORDER — METOPROLOL TARTRATE 50 MG
1 TABLET ORAL
Qty: 0 | Refills: 0 | DISCHARGE

## 2021-03-10 RX ORDER — APIXABAN 2.5 MG/1
1 TABLET, FILM COATED ORAL
Qty: 60 | Refills: 1
Start: 2021-03-10 | End: 2021-05-08

## 2021-03-10 RX ORDER — PANTOPRAZOLE SODIUM 20 MG/1
40 TABLET, DELAYED RELEASE ORAL
Refills: 0 | Status: DISCONTINUED | OUTPATIENT
Start: 2021-03-10 | End: 2021-03-10

## 2021-03-10 RX ORDER — WARFARIN SODIUM 2.5 MG/1
1 TABLET ORAL
Qty: 0 | Refills: 0 | DISCHARGE

## 2021-03-10 RX ORDER — APIXABAN 2.5 MG/1
5 TABLET, FILM COATED ORAL EVERY 12 HOURS
Refills: 0 | Status: DISCONTINUED | OUTPATIENT
Start: 2021-03-10 | End: 2021-03-10

## 2021-03-10 RX ADMIN — Medication 12.5 MILLIGRAM(S): at 17:41

## 2021-03-10 RX ADMIN — Medication 3 MILLIGRAM(S): at 17:41

## 2021-03-10 RX ADMIN — ENOXAPARIN SODIUM 90 MILLIGRAM(S): 100 INJECTION SUBCUTANEOUS at 10:27

## 2021-03-10 RX ADMIN — POLYETHYLENE GLYCOL 3350 17 GRAM(S): 17 POWDER, FOR SOLUTION ORAL at 11:35

## 2021-03-10 NOTE — DISCHARGE NOTE PROVIDER - NSDCCPCAREPLAN_GEN_ALL_CORE_FT
PRINCIPAL DISCHARGE DIAGNOSIS  Diagnosis: Interstitial lung disease  Assessment and Plan of Treatment: Interstitial lung disease      SECONDARY DISCHARGE DIAGNOSES  Diagnosis: Jaundice  Assessment and Plan of Treatment:      PRINCIPAL DISCHARGE DIAGNOSIS  Diagnosis: Interstitial lung disease  Assessment and Plan of Treatment: You have a history of some lung disease after being exposed to some asbestosis in the past. We think this may be the reason why you are very short of breath when you walk around. We had the pulmonology and cardiology doctors see you in the hospital for this. They both would like to follow up with you outpatient. And we have made follow up appointments for Dr. Medina, the lung doctor, on 4/12. Her office will call you if there is an earlier appointment. They ordered a set of labs while you were in the hospital that have not resulted yet. They will speak to your further about this at your follow up appointment.  You also have a cardiology appointment on 3/16 as well. Please follow up with them as well.      SECONDARY DISCHARGE DIAGNOSES  Diagnosis: Jaundice  Assessment and Plan of Treatment: You came into the hospital because your skin was starting to yellow, that is called jaundice. In the hospital your jaundice improved     PRINCIPAL DISCHARGE DIAGNOSIS  Diagnosis: Interstitial lung disease  Assessment and Plan of Treatment: You have a history of some lung disease after being exposed to some asbestosis in the past. We think this may be the reason why you are very short of breath when you walk around. We had the pulmonology and cardiology doctors see you in the hospital for this. They both would like to follow up with you outpatient. And we have made follow up appointments for Dr. Medina, the lung doctor, on 4/12. Her office will call you if there is an earlier appointment. They ordered a set of labs while you were in the hospital that have not resulted yet. They will speak to your further about this at your follow up appointment.  You also have a cardiology appointment on 3/16 as well. Please follow up with them as well.      SECONDARY DISCHARGE DIAGNOSES  Diagnosis: Afib  Assessment and Plan of Treatment: You have a history of atrial fibrillation and were on Coumadin and Toprol to control this. You have been controlled while you were in the hospital. We are switch your coumadin over to Eliquis which you only need to take twice a day and does not require frequent lab checks. Please continue to take this and the Toprol 25mg outpatient. and follow up with cardiology appointment we have made for you to further discuss medications.    Diagnosis: Jaundice  Assessment and Plan of Treatment: You came into the hospital because your skin was starting to yellow, that is called jaundice. In the hospital your jaundice improved slightly. The hematology doctors followed with you inpatient because of this and are still working up the cause of this. They have sent off a number of labs that are still resulting as well. They will communicate with you at your appointment about these results.

## 2021-03-10 NOTE — PROGRESS NOTE ADULT - ASSESSMENT
74 yo M with PMHx Afib (Coumadin/Toprol), [place]O (noncompliant w/ CPAP) presenting to [facility] ED after being sent in from his surgeon (Dr. Katarina Mccallum) for workup of painless jaundice in the setting of macrocytic anemia and hypoxia, concerning for possible underlying hemoglobinopathy. Pt now being stepped down for further management of macrocytic anemia of unknown origin with concern for hemolysis and underlying hemoglobinopathy and interstitial lung disease.

## 2021-03-10 NOTE — PROGRESS NOTE ADULT - PROBLEM SELECTOR PROBLEM 1
Macrocytic anemia

## 2021-03-10 NOTE — PROGRESS NOTE ADULT - ASSESSMENT
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 Pulmonology consulted for hypoxia and radiographic findings of ILD.    #R/o ILD  Pt with chronic dyspnea x10 yrs, dry cough p/w hypoxia + hemolytic anemia. 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 CT scan demonstrates b/l bronchiectasis with diffuse subpleural and basilar reticular opacities suggestive of interstitial lung disease. 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 Ddx includes idiopathic ILD vs pneumoconiosis  - f/u ESR/CRP, C3/C4, YVONNE, anti-RNP, anti-Ro/La, Scl-70, RF/anti-CCP, ANCA, myositis panel including anti-Melba, hypersensitivity panel  - please obtain ABG while pt on RA  - c/w PPI  - will attempt to obtain further collateral from PMD Dr. Suad Ureña  - after d/c will need outpatient sleep study, PFTs, 6 minute walk test    Discussed with attending. Please view additional changes as below.

## 2021-03-10 NOTE — DISCHARGE NOTE PROVIDER - NSDCFUADDAPPT_GEN_ALL_CORE_FT
Please see the cardiology doctors in [place] on 3/16 at 10:30 AM.     Please see Dr. Medina the pulmonology doctor on 4/12 at 10:00AM as well. Her office will call you and coordinate a sooner appointment when possible.     Please see the Hematology doctor on..... Please follow up with your Cardiology Provider, Dr. Cheko Dugan at [phone] [place] [address], [place] [address], [place] [address] on 03/16/2021 at 10:30am.    Appointment was scheduled by Ms. HERBERTH Huston, Referral Coordinator.    Please see Dr. Medina the pulmonology doctor on 4/12 at 10:00AM as well. Her office will call you and coordinate a sooner appointment when possible.     Please see the Hematology doctor on..... Please follow up with your Cardiology Provider, Dr. Cheko Dugan at [phone] [place] [address], [address] [facility], [place] [address] on 03/16/2021 at 10:30am.    Appointment was scheduled by Ms. HERBERTH Huston, Referral Coordinator.    Please see Dr. Medina the pulmonology doctor on 4/12 at 10:00AM as well. Her office will call you and coordinate a sooner appointment when possible.     The hematology doctor's office will call you regarding making an appointment in the next 1-2 weeks. Please schedule the appointment with them.

## 2021-03-10 NOTE — DISCHARGE NOTE PROVIDER - CARE PROVIDERS DIRECT ADDRESSES
,DirectAddress_Unknown,[email or web address] ,[email or web address],[email or web address],DirectAddress_Unknown ,[email or web address],DirectAddress_Unknown,DirectAddress_Unknown

## 2021-03-10 NOTE — DISCHARGE NOTE PROVIDER - NSDCFUSCHEDAPPT_GEN_ALL_CORE_FT
YOVANY WARD ; 04/12/2021 ; NPP [facility] [address] YOVANY WARD ; 03/16/2021 ; NPP [facility] [address]   YOVANY WARD ; 04/12/2021 ; NPP [facility] [address]

## 2021-03-10 NOTE — PROGRESS NOTE ADULT - SUBJECTIVE AND OBJECTIVE BOX
OVERNIGHT EVENTS:    SUBJECTIVE / INTERVAL HPI: Patient seen and examined at bedside.     VITAL SIGNS:  Vital Signs Last 24 Hrs  T(C): 36.7 (10 Mar 2021 06:09), Max: 36.9 (09 Mar 2021 21:26)  T(F): 98 (10 Mar 2021 06:09), Max: 98.4 (09 Mar 2021 21:26)  HR: 86 (10 Mar 2021 06:09) (72 - 97)  BP: 97/60 (10 Mar 2021 06:09) (97/60 - 108/72)  BP(mean): --  RR: 20 (10 Mar 2021 06:09) (18 - 20)  SpO2: 95% (10 Mar 2021 06:09) (90% - 95%)  I&O's Summary      PHYSICAL EXAM:    General: WDWN  HEENT: NC/AT; PERRL, anicteric sclera; MMM  Neck: supple  Cardiovascular: +S1/S2; RRR  Respiratory: CTA B/L; no W/R/R  Gastrointestinal: soft, NT/ND; +BSx4  Extremities: WWP; no edema, clubbing or cyanosis  Vascular: 2+ radial, DP/PT pulses B/L  Neurological: AAOx3; no focal deficits    MEDICATIONS:  MEDICATIONS  (STANDING):  enoxaparin Injectable 90 milliGRAM(s) SubCutaneous every 12 hours  folic acid 3 milliGRAM(s) Oral every 24 hours  melatonin 5 milliGRAM(s) Oral at bedtime  metoprolol tartrate 12.5 milliGRAM(s) Oral two times a day  pantoprazole    Tablet 40 milliGRAM(s) Oral before breakfast  polyethylene glycol 3350 17 Gram(s) Oral daily  senna 1 Tablet(s) Oral at bedtime    MEDICATIONS  (PRN):  acetaminophen   Tablet .. 650 milliGRAM(s) Oral every 6 hours PRN Temp greater or equal to 38C (100.4F), Mild Pain (1 - 3)      ALLERGIES:  Allergies    No Known Allergies    Intolerances        LABS:                        11.2   4.02  )-----------( 153      ( 10 Mar 2021 06:13 )             35.2     03-10    137  |  101  |  30<H>  ----------------------------<  113<H>  4.5   |  26  |  0.96    Ca    9.4      10 Mar 2021 06:13  Phos  3.2     03-10  Mg     1.8     03-10    TPro  7.1  /  Alb  4.1  /  TBili  2.6<H>  /  DBili  0.8<H>  /  AST  67<H>  /  ALT  46<H>  /  AlkPhos  63  03-10        CAPILLARY BLOOD GLUCOSE          RADIOLOGY & ADDITIONAL TESTS: Reviewed.   OVERNIGHT EVENTS: ELIZABET    SUBJECTIVE / INTERVAL HPI: Patient seen and examined at bedside. No new complaints, no dyspnea on rest. Satting well on 2L NC    VITAL SIGNS:  Vital Signs Last 24 Hrs  T(C): 36.7 (10 Mar 2021 06:09), Max: 36.9 (09 Mar 2021 21:26)  T(F): 98 (10 Mar 2021 06:09), Max: 98.4 (09 Mar 2021 21:26)  HR: 86 (10 Mar 2021 06:09) (72 - 97)  BP: 97/60 (10 Mar 2021 06:09) (97/60 - 108/72)  BP(mean): --  RR: 20 (10 Mar 2021 06:09) (18 - 20)  SpO2: 95% (10 Mar 2021 06:09) (90% - 95%)  I&O's Summary      PHYSICAL EXAM:    General: Jaundiced  HEENT: PERRL, icteric sclera; MMM  Cardiovascular: +S1/S2; RRR  Respiratory: Barrell shaped chest, CTA b/l  Gastrointestinal: soft, NT/ND; +BSx4  Extremities: WWP; no edema  Vascular: 2+ radial, DP/PT pulses B/L  Neurological: AAOx3; no focal deficits    MEDICATIONS:  MEDICATIONS  (STANDING):  enoxaparin Injectable 90 milliGRAM(s) SubCutaneous every 12 hours  folic acid 3 milliGRAM(s) Oral every 24 hours  melatonin 5 milliGRAM(s) Oral at bedtime  metoprolol tartrate 12.5 milliGRAM(s) Oral two times a day  pantoprazole    Tablet 40 milliGRAM(s) Oral before breakfast  polyethylene glycol 3350 17 Gram(s) Oral daily  senna 1 Tablet(s) Oral at bedtime    MEDICATIONS  (PRN):  acetaminophen   Tablet .. 650 milliGRAM(s) Oral every 6 hours PRN Temp greater or equal to 38C (100.4F), Mild Pain (1 - 3)      ALLERGIES:  Allergies    No Known Allergies    Intolerances        LABS:                        11.2   4.02  )-----------( 153      ( 10 Mar 2021 06:13 )             35.2     03-10    137  |  101  |  30<H>  ----------------------------<  113<H>  4.5   |  26  |  0.96    Ca    9.4      10 Mar 2021 06:13  Phos  3.2     03-10  Mg     1.8     03-10    TPro  7.1  /  Alb  4.1  /  TBili  2.6<H>  /  DBili  0.8<H>  /  AST  67<H>  /  ALT  46<H>  /  AlkPhos  63  03-10        CAPILLARY BLOOD GLUCOSE          RADIOLOGY & ADDITIONAL TESTS: Reviewed.

## 2021-03-10 NOTE — PROGRESS NOTE ADULT - PROBLEM SELECTOR PLAN 4
Non-compliant with CPAP. Hx notable for recent SOB/VIGIL. On admission, hypoxic to mid 80s on room air requiring O2 supplementation with NC  - Currently satting 88-92% on RA - 6L  - Wean O2 requirements as tolerated  - Home O2 setup in progress  - Needs sleep study and PFTs outpatient.

## 2021-03-10 NOTE — PROGRESS NOTE ADULT - PROVIDER SPECIALTY LIST ADULT
Internal Medicine
Heme/Onc
Hospitalist
Internal Medicine
Internal Medicine
Pulmonology
Heme/Onc
Heme/Onc
Internal Medicine

## 2021-03-10 NOTE — PROGRESS NOTE ADULT - PROBLEM SELECTOR PLAN 1
Schistocytes on smear. 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   - Daily hemolysis labs (haptoglobin, LDH, Bili) per heme/onc  - Cold Ag positive   - Hb electrophoresis wnl  - B12 folate wnl  - Prior EBV and mycoplasma infections per lab work.   - Pending remainder of heme/onc workup ( cryoglobulin, flow cytometry)   - Heme/onc consulted, recs appreciated

## 2021-03-10 NOTE — PROGRESS NOTE ADULT - ATTENDING COMMENTS
Incidental finding of ILD on CT during admission for workup of painless jaundice. Also with hypoxemia requiring nasal cannula supplementation. 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 Needs further serologic workup as above. Will also need an outpt sleep study, PFTs, 6MWT. Add PPI. Pulm team is working on obtaining prior imaging for comparison and to assess for progression of disease.  Follow up as outpt. No objection to discharge to home.
No complaints, exam unchanged.  Lab with continued evidence of hemolysis.  Awaiting B12 levels and hgb electrophoresis.  To check sats on and off O2., continue O2 with activity.   Review need for nay other GI workup. Can go to floor.

## 2021-03-10 NOTE — DISCHARGE NOTE PROVIDER - HOSPITAL COURSE
#Discharge: do not delete    76 yo M with PMHx Afib (Coumadin/Toprol), ORLANDO (noncompliant w/ CPAP) presenting to [facility] ED after being sent in from his surgeon (Dr. Katarina Mccallum) for workup of painless jaundice in the setting of macrocytic anemia and hypoxia, concerning for possible underlying hemoglobinopathy. Pt now being stepped down for further management of macrocytic anemia of unknown origin with concern for hemolysis and underlying hemoglobinopathy.      Problem List/Main Diagnoses (system-based):    Problem/Plan - 1:  ·  Problem: Macrocytic anemia.  Plan: Schistocytes on smear. 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   - Daily hemolysis labs (haptoglobin, LDH, Bili) per heme/onc  - Cold Ag positive   - Hb electrophoresis wnl  - B12 folate wnl  - Prior EBV and mycoplasma infections per lab work.   - Pending remainder of heme/onc workup ( cryoglobulin, flow cytometry)   - Heme/onc consulted, recs appreciated -- sending flow cytometry today.      Problem/Plan - 2:  ·  Problem: Jaundice.  Plan: Patient presenting with painless jaundice with elevated TBili. Had been previously worked up as outpatient with concern for elevated CA 19-9, not repeated on inpatient labs.  - Likely in setting of hemolysis  - F/u with outpatient surgeon (Dr. Mccallum)  - Management as above.      Problem/Plan - 3:  ·  Problem: Interstitial lung disease.  Plan: On chest CT on admission, showing bilateral bronchiectasis and diffuse subpleural reticular opacities with basilar predominance are suggestive of nonspecific interstitial lung disease (pattern favors UIP or fibrotic NSIP).   - Per patient was being followed by a pulmonologist a while ago with serial scans for old asbestosis exposure in the past. Stopped following up with that provider when insurance could not cover a med. Unclear who provider was, and what he was worked up for.   - Pulm consulted for intersitial lung disease contribution to hypoxia.   - Echo with PFO on bubble study, Cards consulted to weigh in on possible shunt physiology contribution to persistent hypoxia.      Problem/Plan - 4:  ·  Problem: Sleep apnea.  Plan: Non-compliant with CPAP. Hx notable for recent SOB/VIGIL. On admission, hypoxic to mid 80s on room air requiring O2 supplementation with NC/Venturi mask.  - Currently satting 88-92% on RA - 6L  - Wean O2 requirements as tolerated.      Problem/Plan - 5:  ·  Problem: Afib.  Plan: Patient with hx of Afib, on coumadin and toprol as outpatient.  - Lovenox 90 BID on admission  - Restarting metoprolol tartrate 12.5 BID with hold parameters.    Inpatient treatment course:   New medications: Home O2.   Labs to be followed outpatient: None.   Exam to be followed outpatient: sleep study, PFTs, 6min walk test.    #Discharge: do not delete    76 yo M with PMHx Afib (Coumadin/Toprol), ORLANDO (noncompliant w/ CPAP) presenting to [facility] ED after being sent in from his surgeon (Dr. Katarina Mccallum) for workup of painless jaundice in the setting of macrocytic anemia and hypoxia, concerning for possible underlying hemoglobinopathy. Pt now being stepped down for further management of macrocytic anemia of unknown origin with concern for hemolysis and underlying hemoglobinopathy.      Problem List/Main Diagnoses (system-based):    Problem/Plan - 1:  ·  Problem: Macrocytic anemia.    Plan: Schistocytes on smear. 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   - Cold Ag positive   - Hb electrophoresis wnl  - B12 folate wnl  - Prior EBV and mycoplasma infections per lab work.   - Pending remainder of heme/onc workup ( cryoglobulin, flow cytometry, molecular genetics)      Problem/Plan - 2:  ·  Problem: Jaundice.    Plan: Patient presenting with painless jaundice with elevated TBili. Had been previously worked up as outpatient with concern for elevated CA 19-9, not repeated on inpatient labs.  - Likely in setting of hemolysis     Problem/Plan - 3:  ·  Problem: Interstitial lung disease.    Plan: On chest CT on admission, showing bilateral bronchiectasis and diffuse subpleural reticular opacities with basilar predominance are suggestive of nonspecific interstitial lung disease (pattern favors UIP or fibrotic NSIP).   - Per patient was being followed by a pulmonologist a while ago with serial scans for old asbestosis exposure in the past. Stopped following up with that provider when insurance could not cover a med. Unclear who provider was, and what he was worked up for.   - Pulm consulted for intersitial lung disease contribution to hypoxia, will continue to follow outpatient.   - Home O2 delivered for patient.   - Echo with PFO on bubble study, Cards consulted to weigh in on possible shunt physiology contribution to persistent hypoxia however not likely to be the source of hypoxia per them.   - Needs 6 minute walk test and PFTS     Problem/Plan - 4:  ·  Problem: Sleep apnea.    Plan: Non-compliant with CPAP. Hx notable for recent SOB/VIGIL. On admission, hypoxic to mid 80s on room air requiring O2 supplementation with NC/Venturi mask.  - Currently satting 88-92% on RA - 6L  - Needs repeat sleep study.      Problem/Plan - 5:  ·  Problem: Afib.  Plan: Patient with hx of Afib, on coumadin and toprol as outpatient.  - Eliquis 5 BID ordered on discharge  - Restarting metoprolol tartrate 12.5 BID with hold parameters.    Inpatient treatment course:   76 yo M with PMHx Afib, ORLANDO (noncompliant w/ CPAP) presenting to [facility] ED after being sent in from his surgeon (Dr. Katarina Mccallum) for workup of painless jaundice in the setting of macrocytic anemia and hypoxia, concerning for possible underlying hemoglobinopathy. Patient admitted to [facility] for further management of macrocytic anemia of unknown origin with concern for hemolysis and underlying hemoglobinopathy. While on [facility], patient remained stable on 6L NC with heme/onc following for work-up. Studies sent (B12, folate, mycoplasma Ab, cryoglobulin, Hb/protein electrophoresis, immunofixation, EBV titers, flow cytometry) with results as mentioned above. Flow cytometry, molecular genetics, and immunofixation pending. Pulmonology consulted for patient and rheum work up was sent for him including ESR/CRP, C3/C4, YVONNE, anti-RNP, anti-Ro/La, Scl-70, RF/anti-CCP, ANCA, myositis panel including anti-Melba, hypersensitivity panel. ESR/CRP wnl. Patient otherwise HD stable and worked with PT without major complaints. Pt is safe and stable for discharge home with O2 and with follow up with heme/onc, pulmonology, and cardiology outpatient.     New medications: Home O2. Eliquis 5 BID.   Labs to be followed outpatient: Immunofixation, molecular genetics, and flow cytometry, C3/C4, YVONNE, anti-RNP, anti-Ro/La, Scl-70, RF/anti-CCP, ANCA, myositis panel including anti-Melba, hypersensitivity panel ALL PENDING   Exam to be followed outpatient: sleep study, PFTs, 6min walk test.

## 2021-03-10 NOTE — DISCHARGE NOTE NURSING/CASE MANAGEMENT/SOCIAL WORK - PATIENT PORTAL LINK FT
You can access the FollowMyHealth Patient Portal offered by [facility] by registering at the following website: [email or web address]. By joining [employer]’s FollowMyHealth portal, you will also be able to view your health information using other applications (apps) compatible with our system.

## 2021-03-10 NOTE — DISCHARGE NOTE PROVIDER - PROVIDER TOKENS
PROVIDER:[[id number]],FOLLOWUP:[2 weeks]],PROVIDER:[[id number]],FOLLOWUP:[2 weeks]] PROVIDER:[[id number]],FOLLOWUP:[2 weeks]],PROVIDER:[[id number]],SCHEDULEDAPPT:[04/12/2021],SCHEDULEDAPPTTIME:[10:00 AM]],FREE:[LAST:[[facility] [place]],PHONE:[phone]],FAX:[(   )    -],ADDRESS:[[facility] [place]  [address] [place] [address]],FOLLOWUP:[2 weeks]] PROVIDER:[TOKEN:[[id number]],FOLLOWUP:[2 weeks]],PROVIDER:[[id number]],SCHEDULEDAPPT:[04/12/2021],SCHEDULEDAPPTTIME:[10:00 AM]],FREE:[LAST:[[facility] [place]],PHONE:[phone]],FAX:[(   )    -],ADDRESS:[[facility] [place]  [address] [place] [address]],SCHEDULEDAPPT:[03/16/2021],SCHEDULEDAPPTTIME:[10:30 AM]] PROVIDER:[[id number]],FOLLOWUP:[2 weeks]],PROVIDER:[[id number]],SCHEDULEDAPPT:[04/12/2021],SCHEDULEDAPPTTIME:[10:00 AM]],PROVIDER:[[id number]],SCHEDULEDAPPT:[03/16/2021],SCHEDULEDAPPTTIME:[10:30 AM]] PROVIDER:[[id number]],SCHEDULEDAPPT:[04/12/2021],SCHEDULEDAPPTTIME:[10:00 AM]],PROVIDER:[TOKEN:[[id number]],SCHEDULEDAPPT:[03/16/2021],SCHEDULEDAPPTTIME:[10:30 AM]],FREE:[LAST:[[facility]],PHONE:[phone]],FAX:[(   )    -],ADDRESS:[[address] [place] [address]],FOLLOWUP:[2 weeks]]

## 2021-03-10 NOTE — PROGRESS NOTE ADULT - SUBJECTIVE AND OBJECTIVE BOX
Hematology Oncology Progress Note (Dr. Vargas )  Discussed with Dr. Vargas and recommendations reviewed with the primary team.    SUBJECTIVE: No new complaints. Still requiring O2.     Interval HPI: Mr. Cooney is a 75 year old male with PMH of Afib on coumadin and ORLANDO (noncompliant with CPAP) sent in by his surgeon Dr. Katarina Mccallum for workup of his painless jaundice. Patient reports generalized weakness, worsening VIGIL and fatigue over the past month, getting worse in the last 2 weeks. He was seen by his PCP 2 weeks ago and was noted to have jaundice and was referred to Dr. Mccallum for further evaluation, which showed elevated CA 19-9. Of note, he reports a prior hospitalization at [facility] for ?aortic valve infection.     ED Course:   Vitals: T 97.3, HR 95, /71, RR 24, O2sat 88% on room air. placed on 3L nc and ventimask with improvement to 93-95%.  Labs: Hb 11.7, .9 | INR 1.42 | Tbili 4.8, AST 53, ALT wnl, alk phos wnl | lactate 2.5 | d-dimer 930  EKG with A.fib with normal HR.   CTA with no evidence of PE, bilateral bronchiectasis and diffuse subpleural reticular opacities with basilar predominance are suggestive of nonspecific interstitial lung disease (pattern favors UIP or fibrotic NSIP), Diffuse parenchymal mosaic attenuation most likely air trapping.  Given 500cc NS. ICU consulted for hypoxia. ABG obtained which showed discordance between pO2 and U2smcwgaqjso (ABG: pH 7.41, pCO2 34, pO2 227, HCO3 21, O2sat 91%). Negative for methemoglobinemia. Concern for hemoglobinopathy, limiting oxygenation.    Hematology consulted for further evaluation of macrocytic anemia. Patient seen and examined. Reports difficult breathing and palpitations. Denies any chest pain, headache, confusion, blurry vision, abdominal pain, n/v/d, leg pain or swelling. He denies any prior episode of jaundice in the past. He denies any recent antibiotic use or new medications.     PAST MEDICAL & SURGICAL HISTORY:  Sleep apnea, non-compliant with BiPaP  Afib  No significant past surgical history    Allergies: NKDA    Social History: Former smoker, 15 cig/day x 5 years   5 shots of whiskey, 2x per week. Denies any ilicit drug use.     MEDICATIONS  (STANDING):  apixaban 5 milliGRAM(s) Oral every 12 hours  folic acid 3 milliGRAM(s) Oral every 24 hours  melatonin 5 milliGRAM(s) Oral at bedtime  metoprolol tartrate 12.5 milliGRAM(s) Oral two times a day  pantoprazole    Tablet 40 milliGRAM(s) Oral before breakfast  polyethylene glycol 3350 17 Gram(s) Oral daily  senna 1 Tablet(s) Oral at bedtime    MEDICATIONS  (PRN):  acetaminophen   Tablet .. 650 milliGRAM(s) Oral every 6 hours PRN Temp greater or equal to 38C (100.4F), Mild Pain (1 - 3)      PHYSICAL EXAM:    Vital Signs Last 24 Hrs  T(C): 36.7 (10 Mar 2021 14:11), Max: 36.9 (09 Mar 2021 21:26)  T(F): 98 (10 Mar 2021 14:11), Max: 98.4 (09 Mar 2021 21:26)  HR: 85 (10 Mar 2021 14:11) (72 - 97)  BP: 101/69 (10 Mar 2021 14:11) (97/60 - 108/72)  RR: 17 (10 Mar 2021 14:11) (17 - 20)  SpO2: 92% (10 Mar 2021 14:11) (92% - 95%)    Gen: lying in bed, jaundiced, in no acute distress  HEENT: normocephalic/atraumatic, no conjunctival pallor, + scleral icterus, no oral thrush/mucosal bleeding/mucositis  Neck: supple, no masses  Cardiovascular: irregular, nl S1S2, no murmurs  Respiratory: on NC 2L, crackles in b/l lower lungs   Gastrointestinal: BS+, soft, NT/ND, no masses, no splenomegaly, no hepatomegaly, no evidence for ascites  Extremities: no edema, no calf tenderness  Neurological: AAOx3, no focal deficits  Skin: jaundice   Lymph Nodes: no cervical/supraclavicular LAD  Musculoskeletal:  full ROM  Psychiatric:  mood stable      Labs:                                   11.2   4.02  )-----------( 153      ( 10 Mar 2021 06:13 )             35.2     03-10    137  |  101  |  30<H>  ----------------------------<  113<H>  4.5   |  26  |  0.96    Ca    9.4      10 Mar 2021 06:13  Phos  3.2     03-10  Mg     1.8     03-10    TPro  7.1  /  Alb  4.1  /  TBili  2.6<H>  /  DBili  0.8<H>  /  AST  67<H>  /  ALT  46<H>  /  AlkPhos  63  03-10    Haptoglobin, Serum in AM (03.10.21 @ 06:13)   Haptoglobin, Serum: <10 mg/dL   Lactate Dehydrogenase, Serum (03.10.21 @ 06:13)   Lactate Dehydrogenase, Serum: 469 U/L   Bilirubin - Total and Direct in AM (03.09.21 @ 05:52)   Bilirubin Direct, Serum: 0.7 mg/dL   Bilirubin Total, Serum: 2.8 mg/dL   Indirect Reacting Bilirubin: 2.0 mg/dL   Lactate Dehydrogenase, Serum in AM (03.09.21 @ 05:52)   Lactate Dehydrogenase, Serum: 457 U/L   Haptoglobin, Serum in AM (03.09.21 @ 05:52)   Haptoglobin, Serum: <10 mg/dL   Folate, Serum (03.08.21 @ 13:04)   Folate, Serum: 19.1 ng/mL   Vitamin B12, Serum (03.08.21 @ 13:04)   Vitamin B12, Serum: 683 pg/mL   Direct Bruno Profile (03.06.21 @ 15:37)   Direct Bruno Poly: Negative   Reticulocyte Count (03.06.21 @ 07:19)   Reticulocyte Percent: 7.3 %   Absolute Reticulocytes: 189.4 K/uL   Cold Agglutinin Titer, Serum (03.05.21 @ 21:23)   Cold Agglutinin Titer, Serum: Positive: patient has a cold agglutinin titer of 4 Iron with Total Binding Capacity (03.05.21 @ 20:38)   Iron - Total Binding Capacity.: 331 ug/dL   % Saturation, Iron: 33 %   Iron Total, Serum: 109 ug/dL   Unsaturated Iron Binding Capacity: 222 ug/dL   Ferritin, Serum (03.05.21 @ 20:38)   Ferritin, Serum: 186 ng/mL     Imaging Studies:    < from: CT Chest, Abdomen and Pelvis w/ IV Cont (03.05.21 @ 13:51) >  IMPRESSION:    1.  No pulmonary embolism.  2.  Bilateral bronchiectasis and diffuse subpleural reticular opacities with basilar predominance are suggestive of nonspecific interstitial lung disease (pattern favors UIP or fibrotic NSIP)  3.  Diffuse parenchymal mosaic attenuation most likely air trapping.  4.  Mild aneurysmal dilatation aortic root, mid ascending thoracic aorta, aortic arch, and proximal descending thoracic aorta.  5.  Mildly dilated main pulmonary artery, nonspecific, can be seen in pulmonary artery hypertension.  6.  Colonic diverticulosis.  7.  Borderline splenic enlargement, nonspecific. Recommend clinical correlation.  8.  Mildly enlarged prostate. Recommend PSA correlation.    < end of copied text >

## 2021-03-10 NOTE — DISCHARGE NOTE PROVIDER - CARE PROVIDER_API CALL
Wilbert Vargas  HEMATOLOGY  [address] [place] [address], [address]  [place] [address]  Phone: [phone]  Fax: [phone]  Follow Up Time: 2 weeks    Bibiana Medina)  Critical Care Medicine; Internal Medicine; Pulmonary Disease  [address] [place] [address]  Phone: [phone]  Fax: [phone]  Follow Up Time: 2 weeks   Wilbert Vargas  HEMATOLOGY  [address], [address]  [place] [address]  Phone: [phone]  Fax: [phone]  Follow Up Time: 2 weeks    Bibiana Medina)  Critical Care Medicine; Internal Medicine; Pulmonary Disease  [address] [place] [address]  Phone: [phone]  Fax: [phone]  Scheduled Appointment: 04/12/2021 10:00 AM    Cardiology and Internal Medicine at [facility]  [address] [place] [address] [address] [place] [address]  Phone: [phone]  Fax: (   )    -  Follow Up Time: 2 weeks   Wilbert Vargas  HEMATOLOGY  [address], [address]  [place] [address]  Phone: [phone]  Fax: [phone]  Follow Up Time: 2 weeks    Bibiana Medina)  Critical Care Medicine; Internal Medicine; Pulmonary Disease  [address] [place] [address]  Phone: [phone]  Fax: [phone]  Scheduled Appointment: 04/12/2021 10:00 AM    Cardiology and Internal Medicine at [facility] [place]  [address] [place] [address] [address] [place] [address]  Phone: [phone]  Fax: (   )    -  Scheduled Appointment: 03/16/2021 10:30 AM   Wilbert Vargas  HEMATOLOGY  [address] [place] [address], [address]  [place] [address]  Phone: [phone]  Fax: [phone]  Follow Up Time: 2 weeks    Bibiana Medina)  Critical Care Medicine; Internal Medicine; Pulmonary Disease  [address] [place] [address]  Phone: [phone]  Fax: [phone]  Scheduled Appointment: 04/12/2021 10:00 AM    Cheko Dugan)  Cardiovascular Disease; Internal Medicine; Interventional Cardiology  [address] [place] [address], [address]  [place] [address]  Phone: [phone]  Fax: [phone]  Scheduled Appointment: 03/16/2021 10:30 AM   Bibiana Medina)  Critical Care Medicine; Internal Medicine; Pulmonary Disease  [facility], [address] [place] [address]  Phone: [phone]  Fax: [phone]  Scheduled Appointment: 04/12/2021 10:00 AM    Cheko Dugan)  Cardiovascular Disease; Internal Medicine; Interventional Cardiology  [address] [place] [address], [address] [place] [address]  Phone: [phone]  Fax: [phone]  Scheduled Appointment: 03/16/2021 10:30 AM    [facility],   [address] [place] [address]  Phone: [phone]  Fax: (   )    -  Follow Up Time: 2 weeks

## 2021-03-10 NOTE — PROGRESS NOTE ADULT - PROBLEM SELECTOR PLAN 5
On chest CT on admission, showing bilateral bronchiectasis and diffuse subpleural reticular opacities with basilar predominance are suggestive of nonspecific interstitial lung disease (pattern favors UIP or fibrotic NSIP). Pt with hx of ORLANDO (not compliant with CPAP), no other known prior pulm hx.   - Will need pulm f/u as outpatient
On chest CT on admission, showing bilateral bronchiectasis and diffuse subpleural reticular opacities with basilar predominance are suggestive of nonspecific interstitial lung disease (pattern favors UIP or fibrotic NSIP). Pt with hx of ORLANDO (not compliant with CPAP), no other known prior pulm hx.   - Will need pulm f/u as outpatient
Patient with hx of Afib, on coumadin and toprol as outpatient.  - Lovenox 90 BID changing to Eliquis 5 BID for discharge planning.   - Restarting metoprolol tartrate 12.5 BID with hold parameters.
On chest CT on admission, showing bilateral bronchiectasis and diffuse subpleural reticular opacities with basilar predominance are suggestive of nonspecific interstitial lung disease (pattern favors UIP or fibrotic NSIP). Pt with hx of ORLANDO (not compliant with CPAP), no other known prior pulm hx.   - Will need pulm f/u as outpatient
Patient with hx of Afib, on coumadin and toprol as outpatient.  - Lovenox 90 BID on admission  - Restarting metoprolol tartrate 12.5 BID with hold parameters.
On chest CT on admission, showing bilateral bronchiectasis and diffuse subpleural reticular opacities with basilar predominance are suggestive of nonspecific interstitial lung disease (pattern favors UIP or fibrotic NSIP). Pt with hx of ORLANDO (not compliant with CPAP), no other known prior pulm hx.   - Will need pulm f/u as outpatient

## 2021-03-10 NOTE — PROGRESS NOTE ADULT - ASSESSMENT
75M w/ PMH of afib (on coumadin), ORLANDO (noncompliant with CPAP), who presents to [facility] ED after being sent in by his surgeon Dr. Katarina Mccallum for workup of jaundice. Patient is being admitted for further management of macrocytic anemia of unknown origin with concern for hemolysis. Hematology consulted for further management.     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 Direct alka is negative, which does not completely exclude a possibility of cold agglutinin disease, however it is atypical.   - Iron studies normal. B12 and folate WNL  - HIV, Hep C and Hep B negative   - CT a/p with no evidence of LAD, no hepatomegaly. Borderline splenic enlargement 13.1 cm.   - SPEP normal and serum immunofixation with no monoclonal band identified, mycoplasma ab positive for IgG indicative of past infection, EBV serologies consistent with past infection. Normal cryoglobulins. Normal ESR. Pending flow cytometry, anti-Ds DNA and YVONNE.   - Check immunoglobulin gene rearrangement.   - Avoid cold exposure, IV fluid and blood products should be warmed to appropriate temperature before infusion. Warm blankets should be provided as needed.     Hypoxia refractory to O2 supplementation   - Co-ox to assess for methemoglobinemia showed normal methemoglobin level.   - CTA negative for PE   - Normal Hemoglobin electrophoresis  - TTE with HFrEF, PFO, evidence of pulmonary hypertension   - Pulmonary consulted.     Discussed with Dr. Vargas   On discharge, please have patient follow up at [facility] on Wednesdays within 1-2 weeks. Office number is [phone].

## 2021-03-10 NOTE — PROGRESS NOTE ADULT - REASON FOR ADMISSION
Painless jaundice, SOB

## 2021-03-10 NOTE — PROGRESS NOTE ADULT - SUBJECTIVE AND OBJECTIVE BOX
YOVANY WARD, 75y, Male  MRN-[id number]  Patient is a 75y old  Male who presents with a chief complaint of Painless jaundice, SOB (10 Mar 2021 09:03)      OVERNIGHT EVENTS: ELIZABET.    SUBJECTIVE: Pt seen and examined at bedside this AM. States he is feeling fine with and without the O2. Knows he cannot walk very far without getting SOB, but can ambulate to the bathroom without issue.    12 Point ROS Negative unless noted otherwise above.  -------------------------------------------------------------------------------  VITAL SIGNS:  Vital Signs Last 24 Hrs  T(C): 36.7 (10 Mar 2021 06:09), Max: 36.9 (09 Mar 2021 21:26)  T(F): 98 (10 Mar 2021 06:09), Max: 98.4 (09 Mar 2021 21:26)  HR: 86 (10 Mar 2021 06:09) (72 - 97)  BP: 97/60 (10 Mar 2021 06:09) (97/60 - 108/72)  BP(mean): --  RR: 20 (10 Mar 2021 06:09) (18 - 20)  SpO2: 95% (10 Mar 2021 06:09) (92% - 95%)  I&O's Summary    10 Mar 2021 07:01  -  10 Mar 2021 14:23  --------------------------------------------------------  IN: 180 mL / OUT: 0 mL / NET: 180 mL        PHYSICAL EXAM:    General: NAD, well developed  HEENT: NC/AT; mild icteric sclera; moist mucosal membranes.  Neck: supple, trachea midline  Cardiovascular: irregular rhythm,, +S1/S2; NO M/R/G  Respiratory: CTA B/L; no W/R/R  Gastrointestinal: soft, NT/ND; +BSx4  Extremities: WWP; no edema or cyanosis  Vascular: 2+ radial, DP/PT pulses B/L  Neurological: AAOx3; no focal deficits    ALLERGIES:  Allergies    No Known Allergies    Intolerances        MEDICATIONS:  MEDICATIONS  (STANDING):  enoxaparin Injectable 90 milliGRAM(s) SubCutaneous every 12 hours  folic acid 3 milliGRAM(s) Oral every 24 hours  melatonin 5 milliGRAM(s) Oral at bedtime  metoprolol tartrate 12.5 milliGRAM(s) Oral two times a day  pantoprazole    Tablet 40 milliGRAM(s) Oral before breakfast  polyethylene glycol 3350 17 Gram(s) Oral daily  senna 1 Tablet(s) Oral at bedtime    MEDICATIONS  (PRN):  acetaminophen   Tablet .. 650 milliGRAM(s) Oral every 6 hours PRN Temp greater or equal to 38C (100.4F), Mild Pain (1 - 3)      -------------------------------------------------------------------------------  LABS:                        11.2   4.02  )-----------( 153      ( 10 Mar 2021 06:13 )             35.2     03-10    137  |  101  |  30<H>  ----------------------------<  113<H>  4.5   |  26  |  0.96    Ca    9.4      10 Mar 2021 06:13  Phos  3.2     03-10  Mg     1.8     03-10    TPro  7.1  /  Alb  4.1  /  TBili  2.6<H>  /  DBili  0.8<H>  /  AST  67<H>  /  ALT  46<H>  /  AlkPhos  63  03-10    LIVER FUNCTIONS - ( 10 Mar 2021 06:13 )  Alb: 4.1 g/dL / Pro: 7.1 g/dL / ALK PHOS: 63 U/L / ALT: 46 U/L / AST: 67 U/L / GGT: x               CAPILLARY BLOOD GLUCOSE          COVID-19 PCR: NotDetec (05 Mar 2021 11:27)      RADIOLOGY & ADDITIONAL TESTS: Reviewed.

## 2021-03-10 NOTE — PROGRESS NOTE ADULT - PROBLEM SELECTOR PLAN 3
Non-compliant with CPAP. Hx notable for recent SOB/VIGIL. On admission, hypoxic to mid 80s on room air requiring O2 supplementation with NC/Venturi mask.  - Currently satting 91% on 6L NC  - Wean O2 requirements as tolerated
On chest CT on admission, showing bilateral bronchiectasis and diffuse subpleural reticular opacities with basilar predominance are suggestive of nonspecific interstitial lung disease (pattern favors UIP or fibrotic NSIP).   - Per patient was being followed by a pulmonologist a while ago with serial scans for old asbestosis exposure in the past. Stopped following up with that provider when insurance could not cover a med. Unclear who provider was, and what he was worked up for.   - Pulm consulted for intersitial lung disease contribution to hypoxia.   - Echo with PFO on bubble study, Cards consulted to weigh in on possible shunt physiology contribution to persistent hypoxia.
Non-compliant with CPAP. Hx notable for recent SOB/VIGIL. On admission, hypoxic to mid 80s on room air requiring O2 supplementation with NC/Venturi mask.  - Currently satting 91% on 6L NC  - Wean O2 requirements as tolerated
Non-compliant with CPAP. Hx notable for recent SOB/VIGIL. On admission, hypoxic to mid 80s on room air requiring O2 supplementation with NC/Venturi mask.  - Currently satting 88-92% on 6L NC  - Wean O2 requirements as tolerated
On chest CT on admission, showing bilateral bronchiectasis and diffuse subpleural reticular opacities with basilar predominance are suggestive of nonspecific interstitial lung disease. Per patient was being followed by a pulmonologist a while ago with serial scans for old asbestosis exposure in the past. Stopped following up with that provider when insurance could not cover a med.   - Pulm consulted for intersitial lung disease contribution to hypoxia.   - Echo with PFO on bubble study, unlikely to contribute to pt hypoxia.  - Rheum work up: ESR/CRP, C3/C4, YVONNE, anti-RNP, anti-Ro/La, Scl-70, RF/anti-CCP, ANCA, myositis panel including anti-Melba, hypersensitivity panel ordered and pending per pulm recommendations.
Non-compliant with CPAP. Hx notable for recent SOB/VIGIL. On admission, hypoxic to mid 80s on room air requiring O2 supplementation with NC/Venturi mask.  - Currently satting 88-92% on 6L NC  - Wean O2 requirements as tolerated

## 2021-03-10 NOTE — PROGRESS NOTE ADULT - PROBLEM SELECTOR PLAN 2
Patient presenting with painless jaundice with elevated TBili. Had been previously worked up as outpatient with concern for elevated CA 19-9.  - Likely in setting of hemolysis  - Management as above
Patient presenting with painless jaundice with elevated TBili. Had been previously worked up as outpatient with concern for elevated CA 19-9, not repeated on inpatient labs.  - Likely in setting of hemolysis  - F/u with outpatient surgeon (Dr. Mccallum)  - Management as above
Patient presenting with painless jaundice with elevated TBili. Had been previously worked up as outpatient with concern for elevated CA 19-9.  - Likely in setting of hemolysis  - F/u with outpatient surgeon (Dr. Mccallum)  - Management as above
Patient presenting with painless jaundice with elevated TBili. Had been previously worked up as outpatient with concern for elevated CA 19-9, not repeated on inpatient labs.  - Likely in setting of hemolysis  - F/u with outpatient surgeon (Dr. Mccallum)  - Management as above
Patient presenting with painless jaundice with elevated TBili. Had been previously worked up as outpatient with concern for elevated CA 19-9, not repeated on inpatient labs.  - Likely in setting of hemolysis  - F/u with outpatient surgeon (Dr. Mccallum)  - Management as above
Patient presenting with painless jaundice with elevated TBili. Had been previously worked up as outpatient with concern for elevated CA 19-9.  - Likely in setting of hemolysis  - Management as above

## 2021-03-10 NOTE — DISCHARGE NOTE PROVIDER - NSDCMRMEDTOKEN_GEN_ALL_CORE_FT
Lasix 20 mg oral tablet: 1 tab(s) orally once a day  Toprol-XL 25 mg oral tablet, extended release: 1 tab(s) orally once a day  warfarin 2.5 mg oral tablet: 1 tab(s) orally once a day   apixaban 5 mg oral tablet: 1 tab(s) orally every 12 hours   Lasix 20 mg oral tablet: 1 tab(s) orally once a day  Toprol-XL 25 mg oral tablet, extended release: 1 tab(s) orally once a day  warfarin 2.5 mg oral tablet: 1 tab(s) orally once a day   apixaban 5 mg oral tablet: 1 tab(s) orally every 12 hours   Lasix 20 mg oral tablet: 1 tab(s) orally once a day  Toprol-XL 25 mg oral tablet, extended release: 1 tab(s) orally once a day

## 2021-03-11 ENCOUNTER — NON-APPOINTMENT (OUTPATIENT)
Age: 76
End: 2021-03-11

## 2021-03-11 LAB
A FUMIGATUS IGG SER QL: 17.8 MCG/ML — SIGNIFICANT CHANGE UP
ALTERNARIA TENUIS/ALTERNATA IGG: 10.3 MCG/ML — SIGNIFICANT CHANGE UP
ANA TITR SER: NEGATIVE — SIGNIFICANT CHANGE UP
AUREOBASIDIUM PULLULANS IGG: 4.1 MCG/ML — SIGNIFICANT CHANGE UP
AUTO DIFF PNL BLD: NEGATIVE — SIGNIFICANT CHANGE UP
C-ANCA SER-ACNC: NEGATIVE — SIGNIFICANT CHANGE UP
MICROPOLYSPORA FAENI IGG: 5.9 MCG/ML — HIGH
P-ANCA SER-ACNC: NEGATIVE — SIGNIFICANT CHANGE UP
PENICILLIUM CHRYSOGENUM/NOTATUM IGG: 15.8 MCG/ML — SIGNIFICANT CHANGE UP
PHOMA BETAE IGG: 4.7 MCG/ML — SIGNIFICANT CHANGE UP
T VULGARIS IGG SER QL: 3.5 MCG/ML — SIGNIFICANT CHANGE UP
TRICHODERMA VIRIDE IGG: 5.1 MCG/ML — SIGNIFICANT CHANGE UP

## 2021-03-12 LAB
ANTI-RIBONUCLEAR PROTEIN: 0.2 AI — SIGNIFICANT CHANGE UP
C3 SERPL-MCNC: 92 MG/DL — SIGNIFICANT CHANGE UP (ref 81–157)
C4 SERPL-MCNC: 17 MG/DL — SIGNIFICANT CHANGE UP (ref 13–39)
CENTROMERE AB SER-ACNC: <0.2 AI — SIGNIFICANT CHANGE UP
DSDNA AB FLD-ACNC: <0.2 AI — SIGNIFICANT CHANGE UP
ENA JO1 AB SER-ACNC: <0.2 AI — SIGNIFICANT CHANGE UP
ENA SCL70 AB SER-ACNC: <0.2 AI — SIGNIFICANT CHANGE UP
ENA SM AB FLD QL: <0.2 AI — SIGNIFICANT CHANGE UP
ENA SS-A AB FLD IA-ACNC: <0.2 AI — SIGNIFICANT CHANGE UP
HEMATOPATHOLOGY REPORT: SIGNIFICANT CHANGE UP
HEMATOPATHOLOGY REPORT: SIGNIFICANT CHANGE UP
RIBOSOMAL P AB SER-ACNC: <0.2 AI — SIGNIFICANT CHANGE UP

## 2021-03-16 ENCOUNTER — NON-APPOINTMENT (OUTPATIENT)
Age: 76
End: 2021-03-16

## 2021-03-16 ENCOUNTER — APPOINTMENT (OUTPATIENT)
Dept: HEART AND VASCULAR | Facility: CLINIC | Age: 76
End: 2021-03-16
Payer: MEDICARE

## 2021-03-16 VITALS
DIASTOLIC BLOOD PRESSURE: 68 MMHG | RESPIRATION RATE: 16 BRPM | WEIGHT: 205 LBS | HEIGHT: 72 IN | TEMPERATURE: 98 F | SYSTOLIC BLOOD PRESSURE: 128 MMHG | HEART RATE: 89 BPM | BODY MASS INDEX: 27.77 KG/M2 | OXYGEN SATURATION: 90 %

## 2021-03-16 LAB — DNA PLOIDY SPEC FC-IMP: SIGNIFICANT CHANGE UP

## 2021-03-16 PROCEDURE — 99214 OFFICE O/P EST MOD 30 MIN: CPT

## 2021-03-16 PROCEDURE — 99204 OFFICE O/P NEW MOD 45 MIN: CPT

## 2021-03-16 PROCEDURE — 99072 ADDL SUPL MATRL&STAF TM PHE: CPT

## 2021-03-16 NOTE — ASSESSMENT
[FreeTextEntry1] : 75 M w/ hx of persistent chronic atrial fibrillation, ORLANDO, Interstitial Lung Disease,  HFrEF here for first evaluation after recent admission at [facility] for dyspnea w/ exertion, fatigue, and weakness.\par \par SOB\par -improved \par -appears euvolemic today, the patient has been taking furosemide for long time, renal funtion wnl , continue for now\par -cont with betablocker \par -echo EF45%, unchanged in the last 3 years as per records\par -NICM with normal coronaries on cath\par -rate controlled afib \par -cont with work up for pulmonary/GI/heme pathologies\par \par AFIB: persistent, rate controlled\par -as per records s/p failed ablation in 2017\par - c/w Metoprolol Tartrate 12.5 BID\par - c/w Eliquis 5mg BID\par \par Patent Foramen Ovale:\par  Noted on transthoracic echocardiogram w/ RV dilation and normal RV systolic function.  RV dilation likely due to underlying pulmonary pathology and ORLANDO. PFO likely incidental &  unlikely contributing to patients exertional dyspnea.  \par \par Heart Failure w/ EF 45%,\par - globally reduced w/ mild-moderate aortic insufficiency. Clinically euvolemic.\par - continue with beta blocker as above\par - continue with home dose lasix 20mg PO daily \par -start lisinopril 5 mg daily \par -recc to report any symptoms or orthostasis\par \par \par f/u in 1 month for symptom assessment

## 2021-03-16 NOTE — HISTORY OF PRESENT ILLNESS
[FreeTextEntry1] : 75 M w/ hx of persistent chronic atrial fibrillation, ORLANDO, Interstitial Lung Disease,  HFrEF here for first evaluation after recent admission at [facility] for dyspnea w/ exertion, fatigue, and weakness. Patient also noted to be jaundiced and being worked up for hemolytic anemia. \par The patient was previously followed by Dr. Ramiro Burgos at [place], requesting change provider for insurance issues. \par Reports that he is feeling fine since discharge, denies chest pain, shortness of breath, palpitations, syncope, presyncope, LE edema, orthopnea. \par He is currently undergoing work up for his hemolytic anemia\par \par \par EKG 3/16/2021\par afib, RBBB (unchanged from 2019) \par \par \par ECHO 3/9/2021\par  1. Biatrial enlargement.\par  2. Injection of agitated saline via a peripheral vein reveals bubbles in the left heart within 3 heart beats, most consistent with a patent foramen ovale.\par  3. Mild-to-moderate aortic regurgitation.\par  4. There is mild tricuspid regurgitation. Pulmonary artery systolic pressure (estimated using the tricuspid regurgitant gradient and an estimate of right atrial pressure) is 44 mmHg.\par  5. The right ventricle is dilated. Right ventricular systolic function is normal.\par  6. The left ventricle cavity size is mildly dilated . There is mild concentric left ventricular hypertrophy. Left ventricular systolic function is mildly reduced with a calculated ejection fraction of 45% with global hypokinesis.\par  7. No pericardial effusion.\par  8. The aortic root is dilated measuring 4.40 cm.\par  9. No prior echo is available for comparison.\par \par \par Echocardiogram 6/12/019\par Severely dilated LV with  moderate diffuse dysfunction and no segmental wall motion abnormalities (EF 49%)\par Mild to moderate MR\par Mildly elevated pulmonary pressures\par Severely left and moderate right atrial enlargement\par -No significant change from study in 5/2018\par \par Cardiac catheterization 5/9/2017 ([place])\par RCA: Mild diffuse disease\par LM: Mild diffuse disease\par LAD:Mild diffuse disease\par Lcx: no obstruction\par \par 2016 afib ablation \par \par

## 2021-03-16 NOTE — PHYSICAL EXAM
[Normal Oral Mucosa] : normal oral mucosa [No Oral Pallor] : no oral pallor [No Oral Cyanosis] : no oral cyanosis [Normal Jugular Venous A Waves Present] : normal jugular venous A waves present [Normal Jugular Venous V Waves Present] : normal jugular venous V waves present [No Jugular Venous Lowery A Waves] : no jugular venous lowery A waves [Heart Rate And Rhythm] : heart rate and rhythm were normal [Heart Sounds] : normal S1 and S2 [Murmurs] : no murmurs present [FreeTextEntry1] : irreg irreg, 2/6 systolic murmur at LUSB [Respiration, Rhythm And Depth] : normal respiratory rhythm and effort [Exaggerated Use Of Accessory Muscles For Inspiration] : no accessory muscle use [Auscultation Breath Sounds / Voice Sounds] : lungs were clear to auscultation bilaterally [Abdomen Soft] : soft [Abdomen Tenderness] : non-tender [Abdomen Mass (___ Cm)] : no abdominal mass palpated [Nail Clubbing] : no clubbing of the fingernails [Cyanosis, Localized] : no localized cyanosis [Petechial Hemorrhages (___cm)] : no petechial hemorrhages [] : no ischemic changes

## 2021-03-17 DIAGNOSIS — R17 UNSPECIFIED JAUNDICE: ICD-10-CM

## 2021-03-17 DIAGNOSIS — D53.9 NUTRITIONAL ANEMIA, UNSPECIFIED: ICD-10-CM

## 2021-03-17 DIAGNOSIS — I48.19 OTHER PERSISTENT ATRIAL FIBRILLATION: ICD-10-CM

## 2021-03-17 DIAGNOSIS — J47.9 BRONCHIECTASIS, UNCOMPLICATED: ICD-10-CM

## 2021-03-17 DIAGNOSIS — G47.33 OBSTRUCTIVE SLEEP APNEA (ADULT) (PEDIATRIC): ICD-10-CM

## 2021-03-17 DIAGNOSIS — Z91.19 PATIENT'S NONCOMPLIANCE WITH OTHER MEDICAL TREATMENT AND REGIMEN: ICD-10-CM

## 2021-03-17 DIAGNOSIS — Z87.891 PERSONAL HISTORY OF NICOTINE DEPENDENCE: ICD-10-CM

## 2021-03-17 DIAGNOSIS — J84.9 INTERSTITIAL PULMONARY DISEASE, UNSPECIFIED: ICD-10-CM

## 2021-03-17 DIAGNOSIS — D59.12 COLD AUTOIMMUNE HEMOLYTIC ANEMIA: ICD-10-CM

## 2021-03-17 DIAGNOSIS — R09.02 HYPOXEMIA: ICD-10-CM

## 2021-03-17 DIAGNOSIS — K57.30 DIVERTICULOSIS OF LARGE INTESTINE WITHOUT PERFORATION OR ABSCESS WITHOUT BLEEDING: ICD-10-CM

## 2021-03-17 DIAGNOSIS — Z79.01 LONG TERM (CURRENT) USE OF ANTICOAGULANTS: ICD-10-CM

## 2021-03-17 DIAGNOSIS — R06.02 SHORTNESS OF BREATH: ICD-10-CM

## 2021-03-17 DIAGNOSIS — Q21.1 ATRIAL SEPTAL DEFECT: ICD-10-CM

## 2021-04-12 ENCOUNTER — LABORATORY RESULT (OUTPATIENT)
Age: 76
End: 2021-04-12

## 2021-04-12 ENCOUNTER — APPOINTMENT (OUTPATIENT)
Dept: PULMONOLOGY | Facility: CLINIC | Age: 76
End: 2021-04-12
Payer: MEDICARE

## 2021-04-12 VITALS
BODY MASS INDEX: 26.68 KG/M2 | HEART RATE: 92 BPM | WEIGHT: 197 LBS | SYSTOLIC BLOOD PRESSURE: 106 MMHG | HEIGHT: 72 IN | DIASTOLIC BLOOD PRESSURE: 69 MMHG | OXYGEN SATURATION: 93 % | TEMPERATURE: 97.6 F

## 2021-04-12 DIAGNOSIS — Z82.0 FAMILY HISTORY OF EPILEPSY AND OTHER DISEASES OF THE NERVOUS SYSTEM: ICD-10-CM

## 2021-04-12 DIAGNOSIS — Z87.891 PERSONAL HISTORY OF NICOTINE DEPENDENCE: ICD-10-CM

## 2021-04-12 DIAGNOSIS — Z82.49 FAMILY HISTORY OF ISCHEMIC HEART DISEASE AND OTHER DISEASES OF THE CIRCULATORY SYSTEM: ICD-10-CM

## 2021-04-12 PROCEDURE — 99214 OFFICE O/P EST MOD 30 MIN: CPT

## 2021-04-12 PROCEDURE — 99072 ADDL SUPL MATRL&STAF TM PHE: CPT

## 2021-04-12 PROCEDURE — 99204 OFFICE O/P NEW MOD 45 MIN: CPT

## 2021-04-13 ENCOUNTER — NON-APPOINTMENT (OUTPATIENT)
Age: 76
End: 2021-04-13

## 2021-04-13 PROBLEM — Z82.49 FAMILY HISTORY OF CARDIAC DISORDER: Status: ACTIVE | Noted: 2021-04-12

## 2021-04-13 PROBLEM — Z82.0 FAMILY HISTORY OF ALZHEIMER'S DISEASE: Status: ACTIVE | Noted: 2021-04-12

## 2021-04-13 RX ORDER — LISINOPRIL 2.5 MG/1
2.5 TABLET ORAL DAILY
Qty: 30 | Refills: 3 | Status: DISCONTINUED | COMMUNITY
Start: 2021-03-16 | End: 2021-04-13

## 2021-04-13 NOTE — HISTORY OF PRESENT ILLNESS
[Former] : former [< 30 pack-years] : < 30 pack-years [Never] : never [TextBox_4] : 76yo M overweight former-minimal-smoker, with hx of AFib (on Warfarin), ORLANDO (non-compliant with CPAP), here to establish pulmonary care s/p hospitalization. \par \par He was hospitalized 3/5/21-3/10/21 for anemia, concern for cold agglutinin hemolysis and painless jaundice. Pulmonology consulted for hypoxemia and radiographic findings of ILD (suggestive of probable UIP). Had hx of asbestosis exposure. Routine CTD-lab work-up was sent including ESR/CRP, C3/C4, YVONNE, anti-RNP, anti-Ro/La, Scl-70, RF/anti-CCP, ANCA, myositis panel including anti-Melba, hypersensitivity panel. He was D/C with home O2. Planned for outpatient work-up for continued ILD work-up, sleep study, PFTs, and 6-MWT.\par He continues to do well. Uses O2 only intermittently while in-house, does not use it outside his apartment. Good exercise tolerance, walks a lot without significant symptoms. More history obtained, and it seems that Mr. Cooney had some sort of a "lung issue" about 10 years ago; he does not remember the name of the pulmonologist who took care of him.

## 2021-04-13 NOTE — ASSESSMENT
[FreeTextEntry1] : REVIEWED:\par Blood work from [facility] hospitalization\par *IgK positive in peripheral blood smear\par *HP panel positive \par *C3 and C4 normal\par \par CT chest 3/2021: subpleural reticular opacities; cylindrical bronchiectasis; mosaic attenuation; no honeycombing; dilated trachea; dilated PA; splenic enlargement\par \par TTE 9/2021: moderate AR; PASP 44 mmHg; EF 45%; +PFO\par \par 76yo M  former-minimal-smoker, with hx of AFib (on Warfarin), ORLANDO (non-compliant with CPAP), here to establish pulmonary care s/p hospitalization. CT imaging with probable UIP pattern, with work-up negative thus far for possible etiology. He is not very symptomatic and does not appear limited in his daily activities by his lung pathology. \par \par - PFTs / 6MWT\par - Empiric GERD tx with PPI \par - Repeat CT Chest 9/2022\par - If pt with worsening symptoms or worsening radiographically appearance, to consider bronchoscopy with biopsy to eval for underlying pathology/histology\par - HST to establish current severity of ORLANDO\par - Encouraged cardiology and hematology follow up\par - Will obtain records from cardiologist\par - Follow up after PFTs, 6MWT and HST

## 2021-04-15 ENCOUNTER — APPOINTMENT (OUTPATIENT)
Dept: PULMONOLOGY | Facility: CLINIC | Age: 76
End: 2021-04-15
Payer: MEDICARE

## 2021-04-15 ENCOUNTER — APPOINTMENT (OUTPATIENT)
Dept: HEART AND VASCULAR | Facility: CLINIC | Age: 76
End: 2021-04-15
Payer: MEDICARE

## 2021-04-15 VITALS — BODY MASS INDEX: 27.22 KG/M2 | HEART RATE: 90 BPM | WEIGHT: 201 LBS | OXYGEN SATURATION: 91 % | HEIGHT: 72 IN

## 2021-04-15 PROCEDURE — 99072 ADDL SUPL MATRL&STAF TM PHE: CPT

## 2021-04-15 PROCEDURE — 99215 OFFICE O/P EST HI 40 MIN: CPT

## 2021-04-15 PROCEDURE — 94010 BREATHING CAPACITY TEST: CPT

## 2021-04-15 PROCEDURE — 94726 PLETHYSMOGRAPHY LUNG VOLUMES: CPT

## 2021-04-15 PROCEDURE — 94729 DIFFUSING CAPACITY: CPT

## 2021-04-15 PROCEDURE — 94618 PULMONARY STRESS TESTING: CPT

## 2021-04-15 RX ORDER — METOPROLOL TARTRATE 25 MG/1
25 TABLET, FILM COATED ORAL
Refills: 0 | Status: DISCONTINUED | COMMUNITY
End: 2021-04-15

## 2021-04-15 RX ORDER — FUROSEMIDE 20 MG/1
20 TABLET ORAL
Refills: 0 | Status: DISCONTINUED | COMMUNITY
End: 2021-04-15

## 2021-04-15 NOTE — HISTORY OF PRESENT ILLNESS
[FreeTextEntry1] : 75 M w/ hx of persistent chronic atrial fibrillation, ORLANDO, Interstitial Lung Disease,  HFrEF here for follow up\par The patient reports walking daily for few blocks. Denies chest pain, shortness of breath, palpitations, syncope, presyncope, LE edema, orthopnea. Today he reports episodes of orthostatsis (Describes dizziness when standing)\par The patient was previously followed by Dr. Ramiro Burgos at [place], requesting change provider for insurance issues. \par He is currently undergoing work up by pulm for interstitial lung disease. \par \par \par EKG 3/16/2021\par afib, RBBB (unchanged from 2019) \par \par \par ECHO 3/9/2021\par  1. Biatrial enlargement.\par  2. Injection of agitated saline via a peripheral vein reveals bubbles in the left heart within 3 heart beats, most consistent with a patent foramen ovale.\par  3. Mild-to-moderate aortic regurgitation.\par  4. There is mild tricuspid regurgitation. Pulmonary artery systolic pressure (estimated using the tricuspid regurgitant gradient and an estimate of right atrial pressure) is 44 mmHg.\par  5. The right ventricle is dilated. Right ventricular systolic function is normal.\par  6. The left ventricle cavity size is mildly dilated . There is mild concentric left ventricular hypertrophy. Left ventricular systolic function is mildly reduced with a calculated ejection fraction of 45% with global hypokinesis.\par  7. No pericardial effusion.\par  8. The aortic root is dilated measuring 4.40 cm.\par  9. No prior echo is available for comparison.\par \par \par Echocardiogram 6/12/019\par Severely dilated LV with  moderate diffuse dysfunction and no segmental wall motion abnormalities (EF 49%)\par Mild to moderate MR\par Mildly elevated pulmonary pressures\par Severely left and moderate right atrial enlargement\par -No significant change from study in 5/2018\par \par Cardiac catheterization 5/9/2017 ([place])\par RCA: Mild diffuse disease\par LM: Mild diffuse disease\par LAD:Mild diffuse disease\par Lcx: no obstruction\par \par 2016 afib ablation \par \par

## 2021-04-15 NOTE — PHYSICAL EXAM
[Normal Oral Mucosa] : normal oral mucosa [No Oral Pallor] : no oral pallor [No Oral Cyanosis] : no oral cyanosis [Normal Jugular Venous A Waves Present] : normal jugular venous A waves present [Normal Jugular Venous V Waves Present] : normal jugular venous V waves present [No Jugular Venous Lowery A Waves] : no jugular venous lowery A waves [Heart Sounds] : normal S1 and S2 [Heart Rate And Rhythm] : heart rate and rhythm were normal [Murmurs] : no murmurs present [Respiration, Rhythm And Depth] : normal respiratory rhythm and effort [Exaggerated Use Of Accessory Muscles For Inspiration] : no accessory muscle use [Auscultation Breath Sounds / Voice Sounds] : lungs were clear to auscultation bilaterally [Abdomen Tenderness] : non-tender [Abdomen Soft] : soft [Nail Clubbing] : no clubbing of the fingernails [Abdomen Mass (___ Cm)] : no abdominal mass palpated [Cyanosis, Localized] : no localized cyanosis [Petechial Hemorrhages (___cm)] : no petechial hemorrhages [] : no ischemic changes [FreeTextEntry1] : irreg irreg, 2/6 systolic murmur at LUSB

## 2021-04-20 ENCOUNTER — NON-APPOINTMENT (OUTPATIENT)
Age: 76
End: 2021-04-20

## 2021-04-23 ENCOUNTER — APPOINTMENT (OUTPATIENT)
Dept: SLEEP CENTER | Facility: HOME HEALTH | Age: 76
End: 2021-04-23

## 2021-05-17 ENCOUNTER — RX RENEWAL (OUTPATIENT)
Age: 76
End: 2021-05-17

## 2021-06-17 LAB
COHGB MFR BLDA: SIGNIFICANT CHANGE UP %
HGB BLDA-MCNC: SIGNIFICANT CHANGE UP G/DL (ref 13–17)
METHGB MFR BLDA: SIGNIFICANT CHANGE UP %
OXYHGB MFR BLDA: SIGNIFICANT CHANGE UP % (ref 94–100)
SAO2 % BLDA: SIGNIFICANT CHANGE UP % (ref 95–100)

## 2021-07-15 ENCOUNTER — APPOINTMENT (OUTPATIENT)
Dept: HEART AND VASCULAR | Facility: CLINIC | Age: 76
End: 2021-07-15

## 2021-08-19 ENCOUNTER — APPOINTMENT (OUTPATIENT)
Dept: HEART AND VASCULAR | Facility: CLINIC | Age: 76
End: 2021-08-19
Payer: MEDICARE

## 2021-08-19 VITALS
DIASTOLIC BLOOD PRESSURE: 64 MMHG | HEART RATE: 97 BPM | SYSTOLIC BLOOD PRESSURE: 106 MMHG | WEIGHT: 206 LBS | BODY MASS INDEX: 27.9 KG/M2 | HEIGHT: 72 IN | OXYGEN SATURATION: 93 %

## 2021-08-19 PROCEDURE — 99215 OFFICE O/P EST HI 40 MIN: CPT

## 2021-08-19 RX ORDER — APIXABAN 5 MG/1
5 TABLET, FILM COATED ORAL
Qty: 60 | Refills: 3 | Status: DISCONTINUED | COMMUNITY
Start: 2021-04-15 | End: 2021-08-19

## 2021-08-19 NOTE — ASSESSMENT
[FreeTextEntry1] : 75 M w/ hx of persistent chronic atrial fibrillation, [place], Interstitial Lung Disease,  HFrEF here for first evaluation after recent admission at [facility] for dyspnea w/ exertion, fatigue, and weakness.\par \par SOB\par -In the setting of worsening dyspnea on exertion we will obtain CCTA to confirm normal coronary anatomy\par - CT coronary to monitor for progression of previously noted non-obstructive CAD \par -cont with betablocker (metoprolol succinate 25 mg daily)\par -echo EF45%, unchanged in the last 3 years as per records\par -NICM with normal coronaries on cath 2017\par -cont with work up for pulmonary/GI/heme pathologies\par \par AFIB: persistent, rate controlled\par -as per records s/p failed ablation in 2017\par - c/w Metoprolol succinate 25 mg daily \par - c/w Eliquis 5mg BID\par \par Patent Foramen Ovale:\par  Noted on transthoracic echocardiogram w/ RV dilation and normal RV systolic function.  RV dilation likely due to underlying pulmonary pathology and [place]. PFO likely incidental &  unlikely contributing to patients exertional dyspnea.  \par \par Heart Failure w/ EF 45%,\par - globally reduced w/ mild-moderate aortic insufficiency. Clinically euvolemic.\par - continue with beta blocker as above\par -cont lisinopril 2.5 mg daily \par -recc to report any symptoms or orthostasis\par \par f/u in 1 month for CCTA results and symptom assessment stretcher

## 2021-08-19 NOTE — PHYSICAL EXAM
[Normal Oral Mucosa] : normal oral mucosa [No Oral Pallor] : no oral pallor [No Oral Cyanosis] : no oral cyanosis [Normal Jugular Venous A Waves Present] : normal jugular venous A waves present [Normal Jugular Venous V Waves Present] : normal jugular venous V waves present [No Jugular Venous Lowery A Waves] : no jugular venous lowery A waves [Heart Rate And Rhythm] : heart rate and rhythm were normal [Heart Sounds] : normal S1 and S2 [Murmurs] : no murmurs present [Respiration, Rhythm And Depth] : normal respiratory rhythm and effort [Exaggerated Use Of Accessory Muscles For Inspiration] : no accessory muscle use [Auscultation Breath Sounds / Voice Sounds] : lungs were clear to auscultation bilaterally [Abdomen Soft] : soft [Abdomen Tenderness] : non-tender [Abdomen Mass (___ Cm)] : no abdominal mass palpated [Nail Clubbing] : no clubbing of the fingernails [Cyanosis, Localized] : no localized cyanosis [Petechial Hemorrhages (___cm)] : no petechial hemorrhages [] : no ischemic changes [FreeTextEntry1] : irreg irreg, 2/6 systolic murmur at LUSB

## 2021-08-19 NOTE — HISTORY OF PRESENT ILLNESS
[FreeTextEntry1] : 75 M w/ hx of persistent chronic atrial fibrillation, [place], Interstitial Lung Disease,  HFrEF here for follow up\par \par He reports that his shortness of breath has progressed to the point where he can only walk roughly two blocks. \par Has a chronic cough that is non remitting\par Currently undergoing workup for shortness of breath with pulmonary\par \par Denies chest pain, palpitations, syncope, presyncope, LE edema, orthopnea. \par Compliant with his medications \par \par EKG 3/16/2021\par afib, RBBB (unchanged from 2019) \par \par ECHO 3/9/2021\par  1. Biatrial enlargement.\par  2. Injection of agitated saline via a peripheral vein reveals bubbles in the left heart within 3 heart beats, most consistent with a patent foramen ovale.\par  3. Mild-to-moderate aortic regurgitation.\par  4. There is mild tricuspid regurgitation. Pulmonary artery systolic pressure (estimated using the tricuspid regurgitant gradient and an estimate of right atrial pressure) is 44 mmHg.\par  5. The right ventricle is dilated. Right ventricular systolic function is normal.\par  6. The left ventricle cavity size is mildly dilated . There is mild concentric left ventricular hypertrophy. Left ventricular systolic function is mildly reduced with a calculated ejection fraction of 45% with global hypokinesis.\par  7. No pericardial effusion.\par  8. The aortic root is dilated measuring 4.40 cm.\par \par \par Cardiac catheterization 5/9/2017 ([facility])\par RCA: Mild diffuse disease\par LM: Mild diffuse disease\par LAD:Mild diffuse disease\par Lcx: no obstruction\par \par

## 2021-09-15 NOTE — DISCHARGE NOTE NURSING/CASE MANAGEMENT/SOCIAL WORK - NSDCFUADDAPPT_GEN_ALL_CORE_FT
Please follow up with your Cardiology Provider, Dr. Cheko Dugan at [phone] [place] [address], Suite [facility], [place] [address] on 03/16/2021 at 10:30am.    Appointment was scheduled by Ms. HERBERTH Huston, Referral Coordinator.    Please see Dr. Medina the pulmonology doctor on 4/12 at 10:00AM as well. Her office will call you and coordinate a sooner appointment when possible.     The hematology doctor's office will call you regarding making an appointment in the next 1-2 weeks. Please schedule the appointment with them.  Family

## 2021-09-23 ENCOUNTER — APPOINTMENT (OUTPATIENT)
Dept: HEART AND VASCULAR | Facility: CLINIC | Age: 76
End: 2021-09-23
Payer: MEDICARE

## 2021-09-23 VITALS
HEIGHT: 72 IN | OXYGEN SATURATION: 96 % | DIASTOLIC BLOOD PRESSURE: 68 MMHG | SYSTOLIC BLOOD PRESSURE: 94 MMHG | HEART RATE: 100 BPM | WEIGHT: 200 LBS | BODY MASS INDEX: 27.09 KG/M2

## 2021-09-23 PROCEDURE — 99214 OFFICE O/P EST MOD 30 MIN: CPT

## 2021-09-26 NOTE — PHYSICAL EXAM
[Normal Oral Mucosa] : normal oral mucosa [No Oral Pallor] : no oral pallor [No Oral Cyanosis] : no oral cyanosis [Normal Jugular Venous A Waves Present] : normal jugular venous A waves present [Normal Jugular Venous V Waves Present] : normal jugular venous V waves present [No Jugular Venous Lowery A Waves] : no jugular venous lowery A waves [Heart Rate And Rhythm] : heart rate and rhythm were normal [Heart Sounds] : normal S1 and S2 [Respiration, Rhythm And Depth] : normal respiratory rhythm and effort [Exaggerated Use Of Accessory Muscles For Inspiration] : no accessory muscle use [Auscultation Breath Sounds / Voice Sounds] : lungs were clear to auscultation bilaterally [Abdomen Soft] : soft [Abdomen Tenderness] : non-tender [Abdomen Mass (___ Cm)] : no abdominal mass palpated [Nail Clubbing] : no clubbing of the fingernails [Cyanosis, Localized] : no localized cyanosis [Petechial Hemorrhages (___cm)] : no petechial hemorrhages [] : no ischemic changes [FreeTextEntry1] : 2/6 systolic murmur at LUSB

## 2021-09-26 NOTE — ASSESSMENT
[FreeTextEntry1] : 75 M w/ hx of persistent chronic atrial fibrillation, [place], Interstitial Lung Disease,  HFrEF here for first evaluation after recent admission at [facility] for dyspnea w/ exertion, fatigue, and weakness.\par \par SOB\par -In the setting of worsening dyspnea on exertion we will obtain CCTA to confirm normal coronary anatomy\par - f/u CT coronary to monitor for progression of previously noted non-obstructive CAD \par -echo EF45%\par - will repeat TTE today to r/o worsening of EF, or pHTN\par -cont with work up for pulmonary/GI/heme pathologies\par \par AFIB: persistent, rate controlled\par -as per records s/p failed ablation in 2017\par - c/w Metoprolol succinate 25 mg daily \par - c/w Eliquis 5mg BID\par \par Patent Foramen Ovale:\par  Noted on transthoracic echocardiogram w/ RV dilation and normal RV systolic function.  RV dilation likely due to underlying pulmonary pathology and [place]. PFO likely incidental &  unlikely contributing to patients exertional dyspnea.  \par \par Heart Failure w/ EF 45%,\par -NICM with normal coronaries on cath 2017\par - globally reduced w/ mild-moderate aortic insufficiency. Clinically euvolemic.\par - continue with beta blocker as above\par -cont lisinopril 2.5 mg daily \par -recc to report any symptoms or orthostasis\par \par f/u Tele visit in 1 month for CCTA  and Echo results and symptom assessment

## 2021-09-26 NOTE — HISTORY OF PRESENT ILLNESS
[FreeTextEntry1] : 75 M w/ hx of persistent chronic atrial fibrillation, [place], Interstitial Lung Disease,  HFrEF here for follow up\par \par His main issue is progressive shortness of breath, particularly exacerbated by warm weather. His exercise tolerance is reduced but stable; he can only walk roughly two blocks. \par \par Currently undergoing workup for shortness of breath with pulmonary, although he notes prior work up with pulmonology at [place] was offered an "expensive drug" but declined therapy. \par \par Denies chest pain, palpitations, syncope, presyncope, LE edema, orthopnea. \par Compliant with his medications \par \par EKG 3/16/2021\par afib, RBBB (unchanged from 2019) \par \par ECHO 3/9/2021\par  1. Biatrial enlargement.\par  2. Injection of agitated saline via a peripheral vein reveals bubbles in the left heart within 3 heart beats, most consistent with a patent foramen ovale.\par  3. Mild-to-moderate aortic regurgitation.\par  4. There is mild tricuspid regurgitation. Pulmonary artery systolic pressure (estimated using the tricuspid regurgitant gradient and an estimate of right atrial pressure) is 44 mmHg.\par  5. The right ventricle is dilated. Right ventricular systolic function is normal.\par  6. The left ventricle cavity size is mildly dilated . There is mild concentric left ventricular hypertrophy. Left ventricular systolic function is mildly reduced with a calculated ejection fraction of 45% with global hypokinesis.\par  7. No pericardial effusion.\par  8. The aortic root is dilated measuring 4.40 cm.\par \par Cardiac catheterization 5/9/2017 ([place])\par RCA: Mild diffuse disease\par LM: Mild diffuse disease\par LAD:Mild diffuse disease\par Lcx: no obstruction\par \par

## 2021-09-27 ENCOUNTER — OUTPATIENT (OUTPATIENT)
Dept: OUTPATIENT SERVICES | Facility: HOSPITAL | Age: 76
LOS: 1 days | End: 2021-09-27

## 2021-09-27 ENCOUNTER — APPOINTMENT (OUTPATIENT)
Dept: CT IMAGING | Facility: CLINIC | Age: 76
End: 2021-09-27
Payer: MEDICARE

## 2021-09-27 PROCEDURE — 75574 CT ANGIO HRT W/3D IMAGE: CPT | Mod: 26

## 2022-01-06 ENCOUNTER — APPOINTMENT (OUTPATIENT)
Dept: HEART AND VASCULAR | Facility: CLINIC | Age: 77
End: 2022-01-06
Payer: MEDICARE

## 2022-01-06 VITALS
WEIGHT: 199 LBS | DIASTOLIC BLOOD PRESSURE: 53 MMHG | HEIGHT: 72 IN | OXYGEN SATURATION: 84 % | SYSTOLIC BLOOD PRESSURE: 97 MMHG | BODY MASS INDEX: 26.95 KG/M2 | HEART RATE: 88 BPM

## 2022-01-06 PROCEDURE — 99215 OFFICE O/P EST HI 40 MIN: CPT

## 2022-01-06 NOTE — ASSESSMENT
[FreeTextEntry1] : 76 M w/ hx of persistent chronic atrial fibrillation, [place], Interstitial Lung Disease,  HFrEF here for follow up\par \par VIGIL\par -Likely multifactorial \par -The patient did not have a repeat echocardiogram yet\par -Echocardiogram to be scheduled in the next few days to r/o worsening of EF, or pHTN\par -CCTA negative for coronary artery disease\par -cont with work up for pulmonary/GI/heme pathologies\par -Obtain labs from PMD (the patient reports recent labs drawn at his PMD office)\par \par AFIB: persistent, rate controlled\par -as per records s/p failed ablation in 2017\par - c/w Metoprolol succinate 25 mg daily \par - c/w Eliquis 5mg BID\par \par Patent Foramen Ovale:\par  Noted on transthoracic echocardiogram w/ RV dilation and normal RV systolic function.  RV dilation likely due to underlying pulmonary pathology and [place]. PFO likely incidental &  unlikely contributing to patients exertional dyspnea.  \par -Repeat echocardiogram as above\par \par Heart Failure w/ EF 45%,\par -NICM with minimal nonobstructive CAD\par - globally reduced w/ mild-moderate aortic insufficiency. Clinically euvolemic.\par - continue with beta blocker as above\par -cont lisinopril 2.5 mg daily \par -recc to report any symptoms or orthostasis\par \par f/u Tele visit in 2 weeks for  Echo results and symptom assessment

## 2022-01-06 NOTE — HISTORY OF PRESENT ILLNESS
[FreeTextEntry1] : 76 M w/ hx of persistent chronic atrial fibrillation, [place], Interstitial Lung Disease,  HFrEF here for follow up and obtain a CCTA results\par The patient is complaining of dyspnea on exertion which is chronic.\par He reports he is being able to walk for few blocks limited by shortness of breath.\par Denies chest pain, palpitations, syncope, presyncope, LE edema, orthopnea. \par Compliant with his medications \par Currently undergoing workup for shortness of breath with pulmonary, although he notes prior work up with pulmonology at [place] was offered an "expensive drug" but declined therapy. \par \par \par \par EKG 3/16/2021\par afib, RBBB (unchanged from 2019) \par \par ECHO 3/9/2021\par  1. Biatrial enlargement.\par  2. Injection of agitated saline via a peripheral vein reveals bubbles in the left heart within 3 heart beats, most consistent with a patent foramen ovale.\par  3. Mild-to-moderate aortic regurgitation.\par  4. There is mild tricuspid regurgitation. Pulmonary artery systolic pressure (estimated using the tricuspid regurgitant gradient and an estimate of right atrial pressure) is 44 mmHg.\par  5. The right ventricle is dilated. Right ventricular systolic function is normal.\par  6. The left ventricle cavity size is mildly dilated . There is mild concentric left ventricular hypertrophy. Left ventricular systolic function is mildly reduced with a calculated ejection fraction of 45% with global hypokinesis.\par  7. No pericardial effusion.\par  8. The aortic root is dilated measuring 4.40 cm.\par \par Cardiac catheterization 5/9/2017 ([place])\par RCA: Mild diffuse disease\par LM: Mild diffuse disease\par LAD:Mild diffuse disease\par Lcx: no obstruction\par \par \par CCTA 9/29/2021\par Calcium score is 951 which is 95th percentile\par Mild stenosis of the proximal mid and distal LAD\par Mild stenosis of the proximal mid and distal RCA\par Minimal stenosis of the mid circumflex and OM 2\par

## 2022-01-13 ENCOUNTER — APPOINTMENT (OUTPATIENT)
Dept: HEART AND VASCULAR | Facility: CLINIC | Age: 77
End: 2022-01-13
Payer: MEDICARE

## 2022-01-13 PROCEDURE — 93306 TTE W/DOPPLER COMPLETE: CPT

## 2022-02-03 ENCOUNTER — APPOINTMENT (OUTPATIENT)
Dept: HEART AND VASCULAR | Facility: CLINIC | Age: 77
End: 2022-02-03
Payer: MEDICARE

## 2022-02-03 PROCEDURE — 99443: CPT

## 2022-02-23 ENCOUNTER — APPOINTMENT (OUTPATIENT)
Dept: CT IMAGING | Facility: HOSPITAL | Age: 77
End: 2022-02-23
Payer: MEDICARE

## 2022-02-23 ENCOUNTER — OUTPATIENT (OUTPATIENT)
Dept: OUTPATIENT SERVICES | Facility: HOSPITAL | Age: 77
LOS: 1 days | End: 2022-02-23
Payer: MEDICARE

## 2022-02-23 LAB — POCT ISTAT CREATININE: 0.8 MG/DL — SIGNIFICANT CHANGE UP (ref 0.5–1.3)

## 2022-02-23 PROCEDURE — 71275 CT ANGIOGRAPHY CHEST: CPT | Mod: 26

## 2022-02-23 PROCEDURE — 82565 ASSAY OF CREATININE: CPT

## 2022-02-23 PROCEDURE — 71275 CT ANGIOGRAPHY CHEST: CPT

## 2022-03-03 ENCOUNTER — NON-APPOINTMENT (OUTPATIENT)
Age: 77
End: 2022-03-03

## 2022-03-10 NOTE — ED PROVIDER NOTE - NSTIMEPROVIDERCAREINITIATE_GEN_ER
Patient: Lazara Dooley Date: 3/10/2022   : 1942    79 year old female      OUTPATIENT WOUND CARE PROGRESS NOTE     Supervising Wound Care / Hyperbaric Medicine Physician: Dr. Barabra Ernst  Consulting Provider:  SELMA Chinchilla  Date of Consultation/Last Comprehensive Exam: 2020  Referring  Provider:  Jimbo Pat MD    SUBJECTIVE:    Chief Complaint:  Bilateral leg swelling    Wound/Ulcer Present:    Venous leg ulcer:  Current Vascular Assessment:  Physical exam.     Current Compression Therapy: None    Additional Wound Category: None     Maximum Baseline Ambulatory Status: Ambulates unassisted    History of Present Illness:  This is a 79 year old female with heart failure, renal failure, diabetes mellitus, lymphedema, and chronic venous insufficiency, well-known to Wound Care for intermittent bilateral leg wounds. Her maintenance plan includes tubular compression stockings although compression wraps have been used long-term due to transitioning from hemo to peritoneal dialysis (and known difficulties with wound recurrence due to leg swelling). Her plan as been weekly compression wrap changes; she has had no active wounds most recently.    Interval Note 3/10/2022:  Seen for bilateral lower extremity skin/edema evaluation.  Has had problems with peritoneal dialysis and so will not be pursuing.  Has had problems with the compression wraps sliding down.  Has some itching but no pain.  Generally feeling well; no new issues.    Current Treatment Regimen:  Dressin-layer wraps  Frequency:  Weekly   Changed by:  Wound care clinic nurse    Review of Systems:  Pertinent items are noted in HPI (history of present illness).    Past Medical History:   Diagnosis Date   • [employer] ([facility]) MRI BiV pacemaker, 2020 2020    7/10/2017: initial dual ppm, Pre-op Dx: Symptomatic bradycardia/ junctional 2020: upgrade to BiV ppm, Pre-op Dx: NICMP , complete heart block Remote  Monitor: Merlin and cell adapter  Anticoagulation: Coumadin    • Anxiety     mild    • Arthritis of knee, degenerative    • Atrial fibrillation (CMS/[facility]) 03/01/2007    chronic   • Atrial tachycardia (CMS/[facility]) 05/04/2018   • Cancer of skin 06/12/2017   • Cardiomyopathy (CMS/[facility])    • cataract bilat. 07/16/2013   • Chronic anticoagulation 10/12/2017   • Chronic pain of both knees 03/18/2018   • Chronic right-sided CHF (congestive heart failure) (CMS/[facility]) 02/02/2019    RV dysfunction   • Chronic venous insufficiency    • COVID-19 07/28/2021    Mild symptoms, decreased appetite, cough   • Dermatophytosis of nail    • Hyperlipidemia    • Hypertension    • Hypothyroidism 06/25/2018   • Junctional bradycardia 07/11/2017    Abbott dual chamber Permanent Pacemaker placed 7/10/17 for symptomatic junctional bradycardia and sinus arrest. Normal function. Paroxysmal Atrial Fibrillation.  Since at least 2009. Asymptomatic. Slow ectopic atrial rhythm and junctional bradycardia on Holter monitor 5/3/17. She was on historically on metoprolol for years for HTN but she became more persi   • Kidney stones 01/01/1992    with calcium oxalate; acute renal failure due to right heart failure, on HD December 2018 through March 4, 2019   • RICK (obstructive sleep apnea)     refuses CPAP   • Osteoporosis    • Positive colorectal cancer screening using Cologuard test    • Pseudogout 04/22/2018   • Pulmonary hypertension (CMS/[facility]) 2010   • S/P biventricular cardiac pacemaker procedure     Abbott BiV pacemaker QUADRA ALLURE MP   • Stage 5 chronic kidney disease (CMS/[facility]) 02/02/2019    dialysis summer 2020    • transient CHB (complete heart block) (CMS/[facility]) 06/24/2019   • Tricuspid valve regurgitation      severe.    • Type 2 diabetes, diet controlled (CMS/[facility])    • Venous stasis ulcer (CMS/[facility]) 09/30/2013   • VT (ventricular tachycardia) (CMS/[facility]) 06/24/2019     Past Surgical History:   Procedure Laterality Date   • Biopsy of breast, incisional      • Colonoscopy remove lesions by snare  06/21/2019    Dr. Cortes, Adenoma Polyps, F/U 3 years   • Lap place peritoneal cath permanent  09/16/2021    Dr Palma   • Lap place peritoneal cath permanent  09/16/2021    peritoneal catheter insertion - Dr. Kyaw Palma   • Laparoscopy,abdm,chetna,oment,dx  11/16/2021    Diagnostic laparoscopy with catheter manipulation - Dr. Kyaw Palma   • Mohs 1 stage upto5 tissue blocks hnhfg Left 09/05/2017    BCC left nose tip   • Ppm dual generator insert  07/10/2017    [facility]   • Tonsillectomy     • Total abdom hysterectomy     • Upgrade to bi-v implant  09/01/2020     Social History     Socioeconomic History   • Marital status:      Spouse name: Not on file   • Number of children: 2   • Years of education: Not on file   • Highest education level: Not on file   Occupational History   • Occupation:      Employer: [employer]     Comment: [employer]  RETIRED    Tobacco Use   • Smoking status: Never Smoker   • Smokeless tobacco: Never Used   Vaping Use   • Vaping Use: never used   Substance and Sexual Activity   • Alcohol use: Not Currently     Comment: beer very rarely   • Drug use: No   • Sexual activity: Not Currently   Other Topics Concern   •  Service Not Asked   • Blood Transfusions No   • Caffeine Concern No   • Occupational Exposure Not Asked   • Hobby Hazards Not Asked   • Sleep Concern Yes   • Stress Concern Yes   • Weight Concern Yes   • Special Diet Yes   • Back Care No   • Exercise No   • Bike Helmet Not Asked   • Seat Belt Yes   • Self-Exams Not Asked   Social History Narrative        Tends to me nonadherent.  Spends a lot of time living at her daughter's home.        What are your living arrangements? family members        What type of residence do you live in?  private residence        Any potential patient safety issues? none        Do you drive yourself? drives self        Any financial problems?  none     Social Determinants of Health      Financial Resource Strain: Low Risk    • Social Determinants: Financial Resource Strain: None   Food Insecurity: No Food Insecurity   • Social Determinants: Food Insecurity: Never   Transportation Needs: No Transportation Needs   • Lack of Transportation (Medical): No   • Lack of Transportation (Non-Medical): No   Physical Activity: Inactive   • Days of Exercise per Week: 0 days   • Minutes of Exercise per Session: 0 min   Stress: Low Risk    • Social Determinants: Stress: A little bit   Social Connections: Socially Integrated   • Social Determinants: Social Connections: 5 or more times a week   Intimate Partner Violence: Not At Risk   • Social Determinants: Intimate Partner Violence Past Fear: No   • Social Determinants: Intimate Partner Violence Current Fear: No     Family History   Adopted: Yes   Problem Relation Age of Onset   • Hypertension Mother    • Blood Disorder Mother         pancytopenia   • Other Father         age    • Cancer, Breast Sister         breast   • Kidney disease Brother         dialysis   • Hypertension Brother    • Anxiety disorder Brother    • Lung Disease Brother         pleural effusion   • Patient is unaware of any medical problems Daughter    • Patient is unaware of any medical problems Son        Current Outpatient Medications   Medication Sig   • metoPROLOL succinate (TOPROL-XL) 50 MG 24 hr tablet Take 0.5 tablets by mouth nightly.   • warfarin (COUMADIN) 4 MG tablet Take 1 tablet by mouth nightly.   • warfarin (COUMADIN) 1 MG tablet (warfarin) Take 0.5 tablets by mouth 3 days a week.   • midodrine (PROAMATINE) 10 MG tablet Take 1 tablet by mouth 2 times daily.   • nystatin (MYCOSTATIN) 517256 UNIT/GM powder Apply top bid prn rash in skin folds   • triamcinolone (ARISTOCORT) 0.1 % cream Apply top bid prn rash or itch on legs   • Euthyrox 25 MCG tablet Take 1 tablet by mouth once daily   • torsemide (DEMADEX) 20 MG tablet Take 1 tablet by mouth once daily   • calcium acetate  gelcap (PHOSLO) 667 MG capsule Take 1 capsule by mouth 3 times daily with meals.   • atorvastatin (LIPITOR) 20 MG tablet Take 1 tablet by mouth every morning.   • B Complex-C-Folic Acid (Yuli-Nacho) Tab Take 1 tablet by mouth once daily   • clindamycin (CLEOCIN) 300 MG capsule TAKE 2 CAPSULES 1 HOUR PRIOR TO DENTAL VISIT   • Epoetin Dmitry (EPOGEN IJ) Inject 1,200 Units into the vein 3 days a week.   • doxercalciferol (Hectorol) 2 MCG/ML injection Inject 2 mcg into the vein 3 days a week.   • Iron Sucrose (VENOFER IV) Inject 50 mg into the vein 1 day a week.   • acetaminophen (TYLENOL) 325 MG tablet Take 2 tablets by mouth every 4 hours as needed for Pain.     No current facility-administered medications for this encounter.        ALLERGIES:  Keflex, Adhesive   (environmental), and Sulfa antibiotics    OBJECTIVE:  Vital Signs:    There were no vitals taken for this visit.    Patient declined vitals today.    Physical Exam:  General appearance: Alert, oriented to person, place, time and situation, in no distress and cooperative  Head:   normocephalic without obvious abnormality  Pulmonary: normal respiratory effort    Bilateral lower extremities: Controlled edema where under wrap but exacerbated proximally; chronic hemosiderosis; baseline minimal inflammation. No open wounds; no drainage. No erythema or increased warmth. Non tender.     03/10/2022 Bilateral legs:      2022 Bilateral le/31/2022 Bilateral legs:      2022 Bilateral legs:      2021  Overview of BLE      2021       REFERENCE PHOTO:  2020 Overview of BLE      Wound Bed Quality:  As above      María-wound Quality:    As above    Additional Descriptors:  As above    Wound Measurements Per Flowsheet:     Wound Leg Right Other (comment) (Active)   Wound Care Team Consult Date 02/15/21 02/15/21 1300   Wound Length (cm) 0 cm 03/10/22 0900   Wound Width (cm) 0 cm 03/10/22 0900   Wound Depth (cm) 0 cm 03/10/22 09   Wound  Surface Area (cm^2) 0 cm^2 03/10/22 0900   Wound Volume (cm^3) 0 cm^3 03/10/22 0900   Number of days: 570       Wound Leg Left Other (comment) (Active)   Number of days: 570       Wound Chest Right Superior/Upper;Medial Skin Tear (Active)   Number of days: 556       Wound Neck Right Anterior Puncture (Active)   Number of days: 556       Wound Chest Left Incision (Active)   Number of days: 555       Wound Leg Anterior Non-pressure Injury (Active)   Wound Length (cm) 0 cm 11/24/20 0800   Wound Width (cm) 0 cm 11/24/20 0800   Wound Depth (cm) 0 cm 11/24/20 0800   Wound Surface Area (cm^2) 0 cm^2 11/24/20 0800   Wound Volume (cm^3) 0 cm^3 11/24/20 0800   Number of days: 531       Wound Leg Left Lateral (Active)   Wound Care Team Consult Date 02/15/21 02/15/21 1300   Wound Length (cm) 0 cm 03/25/21 1200   Wound Width (cm) 0 cm 03/25/21 1200   Wound Depth (cm) 0 cm 03/25/21 1200   Wound Surface Area (cm^2) 0 cm^2 03/25/21 1200   Wound Volume (cm^3) 0 cm^3 03/25/21 1200   Wound Volume Change (Initial) -0.28 cm3 03/25/21 1200   Wound Volume % Change (Initial) -100 % 03/25/21 1200   Wound Volume Change (30 days) -0.28 cm3 03/18/21 1300   Wound Volume % Change (30 days) -100 % 03/18/21 1300   Number of days: 388       Wound Abdomen Anterior Incision (Active)   Number of days: 175       Wound Leg Left Lateral Venous Ulcer (Active)   Wound Care Team Consult Date 09/23/21 09/23/21 1508   Wound Length (cm) 0 cm 03/10/22 0900   Wound Width (cm) 0 cm 03/10/22 0900   Wound Depth (cm) 0 cm 03/10/22 0900   Wound Surface Area (cm^2) 0 cm^2 03/10/22 0900   Wound Volume (cm^3) 0 cm^3 03/10/22 0900   Number of days: 168       Wound Abdomen Anterior Incision (Active)   Number of days: 114       Wound Abdomen Moisture Associated Skin Damage (MASD) (Active)   Number of days: 42       Wound Groin Moisture Associated Skin Damage (MASD) (Active)   Number of days: 23     PROCEDURE:  None Performed    Procedure was Performed by:  Not  applicable    Laboratory assessments reviewed:  No results found for: PAB   Albumin (g/dL)   Date Value   02/15/2022 3.0 (L)   01/25/2022 3.5 (L)   06/09/2021 3.8      No results available in last 24 hours    Lab Results   Component Value Date    WBC 6.8 02/18/2022    GLUCOSE 88 02/18/2022    HGBA1C 6.1 (H) 08/13/2021    CRP 2.9 (H) 08/25/2020    RESR 72 (H) 01/31/2019    CREATININE 6.23 (H) 02/18/2022    GFRA 63 01/24/2020    GFRNA 54 01/24/2020        Culture results:  Specimen Description (no units)   Date Value   01/17/2020 BLOOD, PERIPHERAL HAND, LEFT   01/17/2020 BLOOD, PERIPHERAL LEFT FOREARM   11/18/2019 ULCER LEG, LEFT   08/29/2019 WOUND LEG, LEFT    CULTURE (no units)   Date Value   01/17/2020 NO GROWTH 5 DAYS.   01/17/2020 NO GROWTH 5 DAYS.   11/18/2019 MODERATE STAPHYLOCOCCUS AUREUS (P)   08/29/2019 MODERATE STAPHYLOCOCCUS AUREUS (P)   08/29/2019 RARE ENTEROCOCCUS FAECALIS (P)        Diagnostic Assessments Reviewed:  No new update     US LOWER EXTREMITY VENOUS DUPLEX BILATERAL 01/26/2022  Impression:   1.  There is no evidence of  acute deep vein thrombosis in the right leg.  2.  There is no evidence of  acute deep vein thrombosis in the left leg.  3.  Left-sided popliteal/Baker's cyst    Nutritional Assessment:  Prealbumin and/or Albumin reviewed    Wound treatment goals are palliative:  Yes    DIAGNOSES:  Lymphedema BLE  Venous insufficiency, chronic BILATERAL LOWER EXTREMITIES   Obesity  Renal insufficiency     Medical Decision Making:   Bilateral lower extremities with chronic lymphedema and venous insufficiency; prone to wound recurrence; healed as of 10/21/2021.    Patient is seen for interval follow up, 03/10/22; the bilateral legs are stable with no wounds, although wraps have been slipping down. Can trial change to Dome/Cotton/Coban where the wet layer can help keep wraps in place, and the Dome can help with itching. Did discuss ongoing plan; patient prefers to come to clinic due to her history  of difficult to control swelling and wound recurrence; she is also having left shoulder problems and would not be able to don/doff compression garments. Wound Care therefore considered palliative at this time.    Local skin care (BILATERAL LOWER EXTREMITIES):   - Weekly and prn  - Cleanse with mild soap/water, rinse and pat dry.  - Apply moisturizer to dry skin.  - Compression wraps with dome/cotton/coban.    Infectious disease:  - No s/sx of infection / cellulitis noted today.    Vascular:  - Macro-circulation appears adequate; no s/sx acute ischemia.    Offloading:  - Encourage elevation of legs on pillows.    Nutrition:  - Continue increased protein intake and glycemic control to assist wound healing.     Follow-up monthly with provider; weekly with wound RN; sooner if needed.    Plan of Care:  Advanced Wound Care Recommendations: see above  Percent Wound Closure from consult:  N/A  Care plan to augment wound closure:  Not applicable.  see above     Significant note data copied forward from my prior note and reviewed by me as needed in the formulation of this note and plan.    SELMA Chinchilla     05-Mar-2021 11:04

## 2022-07-07 ENCOUNTER — APPOINTMENT (OUTPATIENT)
Dept: HEART AND VASCULAR | Facility: CLINIC | Age: 77
End: 2022-07-07

## 2022-08-31 ENCOUNTER — APPOINTMENT (OUTPATIENT)
Dept: HEART AND VASCULAR | Facility: CLINIC | Age: 77
End: 2022-08-31

## 2022-09-14 ENCOUNTER — NON-APPOINTMENT (OUTPATIENT)
Age: 77
End: 2022-09-14

## 2022-09-14 ENCOUNTER — APPOINTMENT (OUTPATIENT)
Dept: HEART AND VASCULAR | Facility: CLINIC | Age: 77
End: 2022-09-14

## 2022-09-14 VITALS
WEIGHT: 197.5 LBS | OXYGEN SATURATION: 96 % | BODY MASS INDEX: 26.75 KG/M2 | HEIGHT: 72 IN | DIASTOLIC BLOOD PRESSURE: 60 MMHG | SYSTOLIC BLOOD PRESSURE: 102 MMHG | HEART RATE: 111 BPM

## 2022-09-14 PROCEDURE — 99214 OFFICE O/P EST MOD 30 MIN: CPT

## 2022-09-14 NOTE — REASON FOR VISIT
[FreeTextEntry1] : 77 y/o M with persistent Afib (on eliquis), ORLANDO, ILD, HFrEF (LVEF 45%, LVIDD 6.16cm), nonobstructive CAD, aortic insufficiency who presents for f/u.\par Pt reports worsening symptoms, now NYHA III symptoms. He [place] at [facility] [place] ([place]ulmonary).\par Now developed LE edema bilateral as well over past few weeks\par Denies CP\par Denies syncope\par \par \par \par Imaging/Testing Reviewed:\par 1/2022- LVEF 45% (global mild hypokinesis, LVIDd 6.16), RV dilated with normal RV function, severely enlarged LA (BETH 51), mild MR, mild TR, moderate AI\par \par EKG 3/16/2021\par afib, RBBB (unchanged from 2019) \par \par ECHO 3/9/2021\par  1. Biatrial enlargement.\par  2. Injection of agitated saline via a peripheral vein reveals bubbles in the left heart within 3 heart beats, most consistent with a patent foramen ovale.\par  3. Mild-to-moderate aortic regurgitation.\par  4. There is mild tricuspid regurgitation. Pulmonary artery systolic pressure (estimated using the tricuspid regurgitant gradient and an estimate of right atrial pressure) is 44 mmHg.\par  5. The right ventricle is dilated. Right ventricular systolic function is normal.\par  6. The left ventricle cavity size is mildly dilated . There is mild concentric left ventricular hypertrophy. Left ventricular systolic function is mildly reduced with a calculated ejection fraction of 45% with global hypokinesis.\par  7. No pericardial effusion.\par  8. The aortic root is dilated measuring 4.40 cm.\par \par Cardiac catheterization 5/9/2017 ([place])\par RCA: Mild diffuse disease\par LM: Mild diffuse disease\par LAD:Mild diffuse disease\par Lcx: no obstruction\par \par \par CCTA 9/29/2021\par Calcium score is 951 which is 95th percentile\par Mild stenosis of the proximal mid and distal LAD\par Mild stenosis of the proximal mid and distal RCA\par Minimal stenosis of the mid circumflex and OM 2

## 2022-09-14 NOTE — ASSESSMENT
[FreeTextEntry1] : Systolic heart failure- on BB/ACEi\par -repeat TTE given worsening symptoms\par -no AICD required\par -stable symptoms\par -volume overloaded- acute on chronic CHF\par -starting lasix 40mg daily\par -f/u in 1 wk\par \par Aortic insufficiency- repeat TTE\par \par ILD- likely the  of his symptoms\par -referral to [facility]\par \par CAD- nonobstructive, no angina\par -on eliquis\par \par Afib- rate controlled, on eliquis\par -no bleeding issues

## 2022-09-14 NOTE — PHYSICAL EXAM

## 2022-09-14 NOTE — REVIEW OF SYSTEMS
[Fever] : no fever [Feeling Fatigued] : feeling fatigued [Blurry Vision] : no blurred vision [SOB] : shortness of breath [Dyspnea on exertion] : dyspnea during exertion [Lower Ext Edema] : lower extremity edema [Cough] : no cough [Abdominal Pain] : no abdominal pain [Urinary Frequency] : no change in urinary frequency [Joint Pain] : no joint pain [Rash] : no rash [Dizziness] : no dizziness [Confusion] : no confusion was observed [Easy Bleeding] : no tendency for easy bleeding

## 2022-09-19 LAB
ALBUMIN SERPL ELPH-MCNC: 4.3 G/DL
ALP BLD-CCNC: 98 U/L
ALT SERPL-CCNC: 12 U/L
ANION GAP SERPL CALC-SCNC: 12 MMOL/L
AST SERPL-CCNC: 19 U/L
BASOPHILS # BLD AUTO: 0.02 K/UL
BASOPHILS NFR BLD AUTO: 0.4 %
BILIRUB SERPL-MCNC: 0.6 MG/DL
BUN SERPL-MCNC: 29 MG/DL
CALCIUM SERPL-MCNC: 9.9 MG/DL
CHLORIDE SERPL-SCNC: 105 MMOL/L
CO2 SERPL-SCNC: 25 MMOL/L
CREAT SERPL-MCNC: 0.87 MG/DL
EGFR: 91 ML/MIN/1.73M2
EOSINOPHIL # BLD AUTO: 0.21 K/UL
EOSINOPHIL NFR BLD AUTO: 3.8 %
GLUCOSE SERPL-MCNC: 141 MG/DL
HCT VFR BLD CALC: 34.3 %
HGB BLD-MCNC: 10.1 G/DL
IMM GRANULOCYTES NFR BLD AUTO: 0.2 %
LYMPHOCYTES # BLD AUTO: 0.92 K/UL
LYMPHOCYTES NFR BLD AUTO: 16.5 %
MAN DIFF?: NORMAL
MCHC RBC-ENTMCNC: 23.6 PG
MCHC RBC-ENTMCNC: 29.4 GM/DL
MCV RBC AUTO: 80.1 FL
MONOCYTES # BLD AUTO: 0.39 K/UL
MONOCYTES NFR BLD AUTO: 7 %
NEUTROPHILS # BLD AUTO: 4.03 K/UL
NEUTROPHILS NFR BLD AUTO: 72.1 %
NT-PROBNP SERPL-MCNC: 836 PG/ML
PLATELET # BLD AUTO: 217 K/UL
POTASSIUM SERPL-SCNC: 5.2 MMOL/L
PROT SERPL-MCNC: 7.5 G/DL
RBC # BLD: 4.28 M/UL
RBC # FLD: 17.9 %
SODIUM SERPL-SCNC: 141 MMOL/L
WBC # FLD AUTO: 5.58 K/UL

## 2022-09-28 ENCOUNTER — APPOINTMENT (OUTPATIENT)
Dept: HEART AND VASCULAR | Facility: CLINIC | Age: 77
End: 2022-09-28

## 2022-11-23 ENCOUNTER — OUTPATIENT (OUTPATIENT)
Dept: OUTPATIENT SERVICES | Facility: HOSPITAL | Age: 77
LOS: 1 days | End: 2022-11-23
Payer: MEDICARE

## 2022-11-23 DIAGNOSIS — I50.20 UNSPECIFIED SYSTOLIC (CONGESTIVE) HEART FAILURE: ICD-10-CM

## 2022-11-23 PROCEDURE — 93306 TTE W/DOPPLER COMPLETE: CPT | Mod: 26

## 2022-11-23 PROCEDURE — 93306 TTE W/DOPPLER COMPLETE: CPT

## 2022-11-23 PROCEDURE — 76376 3D RENDER W/INTRP POSTPROCES: CPT | Mod: 26

## 2022-12-13 ENCOUNTER — NON-APPOINTMENT (OUTPATIENT)
Age: 77
End: 2022-12-13

## 2022-12-13 ENCOUNTER — APPOINTMENT (OUTPATIENT)
Dept: HEART AND VASCULAR | Facility: CLINIC | Age: 77
End: 2022-12-13

## 2022-12-13 VITALS
WEIGHT: 184 LBS | OXYGEN SATURATION: 97 % | DIASTOLIC BLOOD PRESSURE: 73 MMHG | BODY MASS INDEX: 24.92 KG/M2 | TEMPERATURE: 97.4 F | HEIGHT: 72 IN | SYSTOLIC BLOOD PRESSURE: 113 MMHG | HEART RATE: 96 BPM

## 2022-12-13 PROCEDURE — 99214 OFFICE O/P EST MOD 30 MIN: CPT

## 2022-12-13 PROCEDURE — 93000 ELECTROCARDIOGRAM COMPLETE: CPT

## 2022-12-13 RX ORDER — LISINOPRIL 2.5 MG/1
2.5 TABLET ORAL
Qty: 90 | Refills: 0 | Status: DISCONTINUED | COMMUNITY
Start: 1900-01-01 | End: 2022-12-13

## 2022-12-15 NOTE — HISTORY OF PRESENT ILLNESS
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 This is the first time he is seeing me but has seen Dr. Farrell back in September, 2022\par \par He complains of dyspnea on exertion and leg swelling over the past several months that has gotten progressively worse. He was recently started on lasix 40mg daily but did not subsequently follow-up. States he has not been taking his metoprolol for unclear reasons, possible he ran out and did not refill. He has been taking his other meds. He has not been compliant with CPAP for several years. \par \par \par \par \par \par \par

## 2022-12-15 NOTE — DISCUSSION/SUMMARY
[FreeTextEntry1] : 76 y/o M with persistent Afib (on eliquis), ORLANDO, ILD, HFmrEF (LVEF 42%), nonobstructive CAD, aortic insufficiency\par \par #HFmEF\par -Obtain routine labs\par -Continue lasix 40mg daily\par -Resume Toprol 25mg\par -Add farxiga 10mg daily\par \par #Afib\par Rates uncontrolled as off BB\par -Continue Toprol 25mg and eliquis 5mg daily\par \par #ILD/ORLANDO\par -F/u pulmonary\par \par Follow-up 1-2 weeks

## 2022-12-15 NOTE — CARDIOLOGY SUMMARY
[de-identified] : EKG 12/13/22: Larry @ [facility], MARKO, KIMBERLEYB [de-identified] : ECHO Nov 2022:\par  1. Mildly reduced left ventricular systolic function, 42%\par  2. Normal right ventricular size.\par  3. Reduced right ventricular systolic function.\par  4. Severely dilated left atrium.\par  5. Color flow Doppler reveals evidence of a patent foramen ovale with \par left to right flow vs a small atrial septal defect.\par  6. Aortic sclerosis without significant stenosis.\par  7. Moderate aortic regurgitation.\par  8. Mild mitral regurgitation.\par  9. Moderate tricuspid regurgitation.\par 10. No evidence of pulmonary hypertension.\par 11. No pericardial effusion.\par 12. Mildly dilated aortic root.\par \par ECHO 3/9/2021\par  1. Biatrial enlargement.\par  2. Injection of agitated saline via a peripheral vein reveals bubbles in the left heart within 3 heart beats, most consistent with a patent foramen ovale.\par  3. Mild-to-moderate aortic regurgitation.\par  4. There is mild tricuspid regurgitation. Pulmonary artery systolic pressure (estimated using the tricuspid regurgitant gradient and an estimate of right atrial pressure) is 44 mmHg.\par  5. The right ventricle is dilated. Right ventricular systolic function is normal.\par  6. The left ventricle cavity size is mildly dilated . There is mild concentric left ventricular hypertrophy. Left ventricular systolic function is mildly reduced with a calculated ejection fraction of 45% with global hypokinesis.\par  7. No pericardial effusion.\par  8. The aortic root is dilated measuring 4.40 cm.\par  [de-identified] : CCTA 9/29/2021\par Calcium score is 951 which is 95th percentile\par Mild stenosis of the proximal mid and distal LAD\par Mild stenosis of the proximal mid and distal RCA\par Minimal stenosis of the mid circumflex and OM 2 [de-identified] : Cardiac catheterization 5/9/2017 ([place])\par RCA: Mild diffuse disease\par LM: Mild diffuse disease\par LAD:Mild diffuse disease\par Lcx: no obstruction

## 2022-12-15 NOTE — PHYSICAL EXAM
[Well Nourished] : well nourished [No Acute Distress] : no acute distress [No Rub] : no rub [No Gallop] : no gallop [No Respiratory Distress] : no respiratory distress  [Normal Bowel Sounds] : normal bowel sounds [Normal Gait] : normal gait [No Varicosities] : no varicosities [No Skin Lesions] : no skin lesions [Normal Speech] : normal speech [Normal S1, S2] : normal S1, S2 [de-identified] : tachycardic; + 2/6 diastolic murmur [de-identified] : inspiratory wheeze [de-identified] : 1+ pitting edema b/l

## 2022-12-15 NOTE — REVIEW OF SYSTEMS
[Feeling Fatigued] : feeling fatigued [SOB] : shortness of breath [Dyspnea on exertion] : dyspnea during exertion [Lower Ext Edema] : lower extremity edema [Fever] : no fever [Blurry Vision] : no blurred vision [Cough] : no cough [Abdominal Pain] : no abdominal pain [Urinary Frequency] : no change in urinary frequency [Joint Pain] : no joint pain [Rash] : no rash [Dizziness] : no dizziness [Confusion] : no confusion was observed [Easy Bleeding] : no tendency for easy bleeding

## 2022-12-20 ENCOUNTER — APPOINTMENT (OUTPATIENT)
Dept: HEART AND VASCULAR | Facility: CLINIC | Age: 77
End: 2022-12-20

## 2022-12-20 ENCOUNTER — NON-APPOINTMENT (OUTPATIENT)
Age: 77
End: 2022-12-20

## 2022-12-20 ENCOUNTER — APPOINTMENT (OUTPATIENT)
Dept: HEART AND VASCULAR | Facility: CLINIC | Age: 77
End: 2022-12-20
Payer: MEDICARE

## 2022-12-20 VITALS
HEART RATE: 112 BPM | DIASTOLIC BLOOD PRESSURE: 71 MMHG | OXYGEN SATURATION: 94 % | HEIGHT: 72 IN | BODY MASS INDEX: 26.28 KG/M2 | SYSTOLIC BLOOD PRESSURE: 104 MMHG | TEMPERATURE: 95.3 F | WEIGHT: 194 LBS

## 2022-12-20 VITALS
HEART RATE: 98 BPM | TEMPERATURE: 97.3 F | BODY MASS INDEX: 26.28 KG/M2 | HEIGHT: 72 IN | SYSTOLIC BLOOD PRESSURE: 112 MMHG | DIASTOLIC BLOOD PRESSURE: 62 MMHG | WEIGHT: 194 LBS

## 2022-12-20 PROCEDURE — 99215 OFFICE O/P EST HI 40 MIN: CPT

## 2022-12-20 PROCEDURE — 99214 OFFICE O/P EST MOD 30 MIN: CPT

## 2022-12-20 PROCEDURE — 93000 ELECTROCARDIOGRAM COMPLETE: CPT

## 2022-12-20 PROCEDURE — 99204 OFFICE O/P NEW MOD 45 MIN: CPT

## 2022-12-20 NOTE — HISTORY OF PRESENT ILLNESS
[FreeTextEntry1] : 78 y/o M with h/o ORLANDO (non compliant with CPAP),  ILD, HFmEF (LVEF 42%), nonobstructive CAD, aortic insufficiency and persistent atrial fibrillation who presents for initial evaluation.\par \par Patient reports longstanding h/o SOB (> 5 years) but feels progressively declining over the last few months.  Has been following with Dr. Chino- recently started Furosemide without improvement in LE edema or breathing status. Digoxin added to regimen today by Dr. Chino given HR in the  bpm range.  Echo shows severe LA dilatation.  He thinks he has been in AFib for several years.  No h/o DCCV or ablation per his report.  \par \par No CP, presyncope/syncope.  No bleeding issues on Eliquis.

## 2022-12-20 NOTE — CARDIOLOGY SUMMARY
[de-identified] : EKG 12/13/22: Larry @ 104, MARKO, KIMBERLEYB [de-identified] : ECHO Nov 2022:\par  1. Mildly reduced left ventricular systolic function, 42%\par  2. Normal right ventricular size.\par  3. Reduced right ventricular systolic function.\par  4. Severely dilated left atrium.\par  5. Color flow Doppler reveals evidence of a patent foramen ovale with \par left to right flow vs a small atrial septal defect.\par  6. Aortic sclerosis without significant stenosis.\par  7. Moderate aortic regurgitation.\par  8. Mild mitral regurgitation.\par  9. Moderate tricuspid regurgitation.\par 10. No evidence of pulmonary hypertension.\par 11. No pericardial effusion.\par 12. Mildly dilated aortic root.\par \par ECHO 3/9/2021\par  1. Biatrial enlargement.\par  2. Injection of agitated saline via a peripheral vein reveals bubbles in the left heart within 3 heart beats, most consistent with a patent foramen ovale.\par  3. Mild-to-moderate aortic regurgitation.\par  4. There is mild tricuspid regurgitation. Pulmonary artery systolic pressure (estimated using the tricuspid regurgitant gradient and an estimate of right atrial pressure) is 44 mmHg.\par  5. The right ventricle is dilated. Right ventricular systolic function is normal.\par  6. The left ventricle cavity size is mildly dilated . There is mild concentric left ventricular hypertrophy. Left ventricular systolic function is mildly reduced with a calculated ejection fraction of 45% with global hypokinesis.\par  7. No pericardial effusion.\par  8. The aortic root is dilated measuring 4.40 cm.\par  [de-identified] : CCTA 9/29/2021\par Calcium score is 951 which is 95th percentile\par Mild stenosis of the proximal mid and distal LAD\par Mild stenosis of the proximal mid and distal RCA\par Minimal stenosis of the mid circumflex and OM 2 [de-identified] : Cardiac catheterization 5/9/2017 ([place])\par RCA: Mild diffuse disease\par LM: Mild diffuse disease\par LAD:Mild diffuse disease\par Lcx: no obstruction

## 2022-12-20 NOTE — PHYSICAL EXAM
[Well Developed] : well developed [Well Nourished] : well nourished [No Acute Distress] : no acute distress [Normal Conjunctiva] : normal conjunctiva [Normal Venous Pressure] : normal venous pressure [No Carotid Bruit] : no carotid bruit [Normal S1, S2] : normal S1, S2 [No Murmur] : no murmur [No Rub] : no rub [No Gallop] : no gallop [5th Left ICS - MCL] : palpated at the 5th LICS in the midclavicular line [Normal] : normal [Irregularly Irregular] : irregularly irregular [Normal S1] : normal S1 [Normal S2] : normal S2 [Clear Lung Fields] : clear lung fields [Good Air Entry] : good air entry [No Respiratory Distress] : no respiratory distress  [Normal Rate] : the respiratory rate was normal [Soft] : abdomen soft [Non Tender] : non-tender [No Masses/organomegaly] : no masses/organomegaly [Normal Bowel Sounds] : normal bowel sounds [Normal Gait] : normal gait [No Edema] : no edema [No Cyanosis] : no cyanosis [No Clubbing] : no clubbing [No Varicosities] : no varicosities [No Rash] : no rash [No Skin Lesions] : no skin lesions [Moves all extremities] : moves all extremities [No Focal Deficits] : no focal deficits [Normal Speech] : normal speech [Alert and Oriented] : alert and oriented [Normal memory] : normal memory

## 2022-12-20 NOTE — PHYSICAL EXAM
[Well Nourished] : well nourished [No Acute Distress] : no acute distress [Normal S1, S2] : normal S1, S2 [No Rub] : no rub [No Gallop] : no gallop [No Respiratory Distress] : no respiratory distress  [Normal Bowel Sounds] : normal bowel sounds [Normal Gait] : normal gait [No Varicosities] : no varicosities [No Skin Lesions] : no skin lesions [Normal Speech] : normal speech [Normal] : moves all extremities, no focal deficits, normal speech [de-identified] : tachycardic; + 2/6 diastolic murmur [de-identified] : occasional inspiratory wheeze [de-identified] : 1+ pitting edema b/l

## 2022-12-20 NOTE — ADDENDUM
[FreeTextEntry1] : I, Thu Nugent, am scribing for and the presence of Dr. Worthington the following sections: HPI, PMH,Family/social history, ROS, Physical Exam, Assessment / Plan.\par \par I, Emiliano Worthington, personally performed the services described in the documentation, reviewed the documentation recorded by the scribe in my presence and it accurately and completely records my words and actions.\par

## 2022-12-20 NOTE — DISCUSSION/SUMMARY
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 Was 5.2 before - not a good candidate for aldactone or Entresto. \par \par #Afib\par - Rates still uncontrolled on Metoprolol 25 mg qd. \par - Continue Toprol 25mg and eliquis 5mg daily. Inc Toprol XL to 50 mg (EP recs from today appreciated)\par - Add digoxin 0.125 mg qd for rate control (RV dysfunction on TTE as well)\par - Afib is longstanding - not a good candidate for amio given ILD\par \par #ILD/ORLANDO\par -F/u pulmonary\par \par Follow-up 2 weeks

## 2022-12-20 NOTE — HISTORY OF PRESENT ILLNESS
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 This is the first time he is seeing me but has seen Dr. Farrell back in September, 2022\par \par 12/20/22: Leg swelling and dyspnea slightly improved. Still c/o dyspnea though. HR between 96 - and 111\par BP wnl\par \par 12/13/22: He complains of dyspnea on exertion and leg swelling over the past several months that has gotten progressively worse. He was recently started on lasix 40mg daily but did not subsequently follow-up. States he has not been taking his metoprolol for unclear reasons, possible he ran out and did not refill. He has been taking his other meds. He has not been compliant with CPAP for several years. \par \par \par \par \par \par \par

## 2022-12-20 NOTE — CARDIOLOGY SUMMARY
[de-identified] : 12/20/22 Atrial flutter-fibrillation \par -Right bundle branch block and right axis [de-identified] : TTE 1/13/22 \par EF 45-50%, severely enlarged LA, Dilated RV with normal systolic function, midlly dilated RA, moderate AR, mild to mod TR

## 2022-12-20 NOTE — REVIEW OF SYSTEMS
[Dyspnea on exertion] : dyspnea during exertion [Lower Ext Edema] : lower extremity edema [Palpitations] : no palpitations [Negative] : Heme/Lymph

## 2022-12-21 LAB
ALBUMIN SERPL ELPH-MCNC: 4 G/DL
ALP BLD-CCNC: 97 U/L
ALT SERPL-CCNC: 6 U/L
ANION GAP SERPL CALC-SCNC: 11 MMOL/L
AST SERPL-CCNC: 16 U/L
BILIRUB SERPL-MCNC: 0.5 MG/DL
BUN SERPL-MCNC: 28 MG/DL
CALCIUM SERPL-MCNC: 9.2 MG/DL
CHLORIDE SERPL-SCNC: 101 MMOL/L
CHOLEST SERPL-MCNC: 118 MG/DL
CO2 SERPL-SCNC: 26 MMOL/L
CREAT SERPL-MCNC: 1.09 MG/DL
EGFR: 71 ML/MIN/1.73M2
ESTIMATED AVERAGE GLUCOSE: 140 MG/DL
GLUCOSE SERPL-MCNC: 109 MG/DL
HBA1C MFR BLD HPLC: 6.5 %
HDLC SERPL-MCNC: 38 MG/DL
LDLC SERPL CALC-MCNC: 63 MG/DL
MAGNESIUM SERPL-MCNC: 2.2 MG/DL
NONHDLC SERPL-MCNC: 80 MG/DL
PHOSPHATE SERPL-MCNC: 4 MG/DL
POTASSIUM SERPL-SCNC: 4.7 MMOL/L
PROT SERPL-MCNC: 7.2 G/DL
SODIUM SERPL-SCNC: 138 MMOL/L
TRIGL SERPL-MCNC: 86 MG/DL

## 2023-01-10 ENCOUNTER — APPOINTMENT (OUTPATIENT)
Dept: PULMONOLOGY | Facility: CLINIC | Age: 78
End: 2023-01-10
Payer: COMMERCIAL

## 2023-01-10 ENCOUNTER — LABORATORY RESULT (OUTPATIENT)
Age: 78
End: 2023-01-10

## 2023-01-10 ENCOUNTER — APPOINTMENT (OUTPATIENT)
Dept: HEART AND VASCULAR | Facility: CLINIC | Age: 78
End: 2023-01-10
Payer: COMMERCIAL

## 2023-01-10 VITALS
HEIGHT: 72 IN | DIASTOLIC BLOOD PRESSURE: 75 MMHG | SYSTOLIC BLOOD PRESSURE: 125 MMHG | BODY MASS INDEX: 26.28 KG/M2 | HEART RATE: 98 BPM | TEMPERATURE: 98.4 F | RESPIRATION RATE: 12 BRPM | OXYGEN SATURATION: 97 % | WEIGHT: 194 LBS

## 2023-01-10 VITALS
WEIGHT: 194 LBS | BODY MASS INDEX: 26.28 KG/M2 | DIASTOLIC BLOOD PRESSURE: 61 MMHG | HEIGHT: 72 IN | OXYGEN SATURATION: 95 % | SYSTOLIC BLOOD PRESSURE: 99 MMHG | TEMPERATURE: 96 F | HEART RATE: 74 BPM

## 2023-01-10 DIAGNOSIS — I48.11 LONGSTANDING PERSISTENT ATRIAL FI: ICD-10-CM

## 2023-01-10 DIAGNOSIS — I77.810 THORACIC AORTIC ECTASIA: ICD-10-CM

## 2023-01-10 DIAGNOSIS — K21.9 GASTRO-ESOPHAGEAL REFLUX DISEASE W/OUT ESOPHAGITIS: ICD-10-CM

## 2023-01-10 DIAGNOSIS — I48.20 CHRONIC ATRIAL FIBRILLATION, UNSP: ICD-10-CM

## 2023-01-10 DIAGNOSIS — I50.20 UNSPECIFIED SYSTOLIC (CONGESTIVE) HEART FAILURE: ICD-10-CM

## 2023-01-10 PROCEDURE — 99205 OFFICE O/P NEW HI 60 MIN: CPT

## 2023-01-10 PROCEDURE — 99214 OFFICE O/P EST MOD 30 MIN: CPT

## 2023-01-10 RX ORDER — POTASSIUM CHLORIDE 1500 MG/1
20 TABLET, EXTENDED RELEASE ORAL DAILY
Qty: 30 | Refills: 3 | Status: DISCONTINUED | COMMUNITY
Start: 2022-09-14 | End: 2023-01-10

## 2023-01-10 RX ORDER — DIGOXIN 125 UG/1
125 TABLET ORAL
Qty: 90 | Refills: 3 | Status: ACTIVE | COMMUNITY
Start: 2022-12-20 | End: 1900-01-01

## 2023-01-10 RX ORDER — METOPROLOL SUCCINATE 25 MG/1
25 TABLET, EXTENDED RELEASE ORAL
Qty: 180 | Refills: 3 | Status: ACTIVE | COMMUNITY
Start: 2021-04-15 | End: 1900-01-01

## 2023-01-10 NOTE — REVIEW OF SYSTEMS
[Palpitations] : no palpitations [Negative] : Heme/Lymph [Feeling Fatigued] : feeling fatigued [SOB] : shortness of breath [Dyspnea on exertion] : dyspnea during exertion [Lower Ext Edema] : lower extremity edema [Fever] : no fever [Blurry Vision] : no blurred vision [Cough] : no cough [Abdominal Pain] : no abdominal pain [Urinary Frequency] : no change in urinary frequency [Joint Pain] : no joint pain [Rash] : no rash [Dizziness] : no dizziness [Confusion] : no confusion was observed [Easy Bleeding] : no tendency for easy bleeding

## 2023-01-10 NOTE — REASON FOR VISIT
[Initial] : an initial visit [Sleep Apnea] : sleep apnea [Shortness of Breath] : shortness of breath [ILD] : ILD

## 2023-01-10 NOTE — HISTORY OF PRESENT ILLNESS
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 \par \par 1/10/23: Digoxin was started on last visit and Metoprolol ER was increased to twice per day. Pt is stilll not feeling well. He is very SOB and dyspneic on exertion. No chest pain. Stable chronic LE edema. \par \par 12/20/22: Leg swelling and dyspnea slightly improved. Still c/o dyspnea though. HR between 96 - and 111\par BP wnl\par \par 12/13/22: He complains of dyspnea on exertion and leg swelling over the past several months that has gotten progressively worse. He was recently started on lasix 40mg daily but did not subsequently follow-up. States he has not been taking his metoprolol for unclear reasons, possible he ran out and did not refill. He has been taking his other meds. He has not been compliant with CPAP for several years. \par \par \par \par \par \par \par

## 2023-01-10 NOTE — CARDIOLOGY SUMMARY
[de-identified] : EKG 12/13/22: Larry @ 104, MARKO, KIMBERLEYB [de-identified] : ECHO Nov 2022:\par  1. Mildly reduced left ventricular systolic function, 42%\par  2. Normal right ventricular size.\par  3. Reduced right ventricular systolic function.\par  4. Severely dilated left atrium.\par  5. Color flow Doppler reveals evidence of a patent foramen ovale with \par left to right flow vs a small atrial septal defect.\par  6. Aortic sclerosis without significant stenosis.\par  7. Moderate aortic regurgitation.\par  8. Mild mitral regurgitation.\par  9. Moderate tricuspid regurgitation.\par 10. No evidence of pulmonary hypertension.\par 11. No pericardial effusion.\par 12. Mildly dilated aortic root.\par \par ECHO 3/9/2021\par  1. Biatrial enlargement.\par  2. Injection of agitated saline via a peripheral vein reveals bubbles in the left heart within 3 heart beats, most consistent with a patent foramen ovale.\par  3. Mild-to-moderate aortic regurgitation.\par  4. There is mild tricuspid regurgitation. Pulmonary artery systolic pressure (estimated using the tricuspid regurgitant gradient and an estimate of right atrial pressure) is 44 mmHg.\par  5. The right ventricle is dilated. Right ventricular systolic function is normal.\par  6. The left ventricle cavity size is mildly dilated . There is mild concentric left ventricular hypertrophy. Left ventricular systolic function is mildly reduced with a calculated ejection fraction of 45% with global hypokinesis.\par  7. No pericardial effusion.\par  8. The aortic root is dilated measuring 4.40 cm.\par  [de-identified] : CCTA 9/29/2021\par Calcium score is 951 which is 95th percentile\par Mild stenosis of the proximal mid and distal LAD\par Mild stenosis of the proximal mid and distal RCA\par Minimal stenosis of the mid circumflex and OM 2 [de-identified] : Cardiac catheterization 5/9/2017 ([place])\par RCA: Mild diffuse disease\par LM: Mild diffuse disease\par LAD:Mild diffuse disease\par Lcx: no obstruction

## 2023-01-10 NOTE — PHYSICAL EXAM
[Well Developed] : well developed [Normal Conjunctiva] : normal conjunctiva [Normal Venous Pressure] : normal venous pressure [No Carotid Bruit] : no carotid bruit [No Murmur] : no murmur [5th Left ICS - MCL] : palpated at the 5th LICS in the midclavicular line [Irregularly Irregular] : irregularly irregular [Normal S1] : normal S1 [Normal S2] : normal S2 [Clear Lung Fields] : clear lung fields [Good Air Entry] : good air entry [Normal Rate] : the respiratory rate was normal [Soft] : abdomen soft [Non Tender] : non-tender [No Masses/organomegaly] : no masses/organomegaly [No Edema] : no edema [No Cyanosis] : no cyanosis [No Clubbing] : no clubbing [No Rash] : no rash [Moves all extremities] : moves all extremities [No Focal Deficits] : no focal deficits [Alert and Oriented] : alert and oriented [Normal memory] : normal memory [Well Nourished] : well nourished [No Acute Distress] : no acute distress [Normal S1, S2] : normal S1, S2 [No Rub] : no rub [No Gallop] : no gallop [No Respiratory Distress] : no respiratory distress  [Normal Bowel Sounds] : normal bowel sounds [Normal Gait] : normal gait [No Varicosities] : no varicosities [No Skin Lesions] : no skin lesions [Normal] : moves all extremities, no focal deficits, normal speech [Normal Speech] : normal speech [de-identified] : tachycardic; + 2/6 diastolic murmur [de-identified] : occasional inspiratory wheeze [de-identified] : 1+ pitting edema b/l

## 2023-01-10 NOTE — DISCUSSION/SUMMARY
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 \par - Check Digoxin level\par - Continue Toprol 25mg bid and eliquis 5mg daily. - cont digoxin 0.125 mg qd for rate control (RV dysfunction on TTE as well)\par - Afib is longstanding - not a good candidate for amio given ILD\par \par #ILD/ORLANDO\par -F/u pulmonary today. Given that pt is rate controlled now and compensated and still SOB despite that, I think there is a large pulmonary component to his SOB. \par \par Follow-up 2 weeks

## 2023-01-10 NOTE — PHYSICAL EXAM
[Well Developed] : well developed [Normal Conjunctiva] : normal conjunctiva [Normal Venous Pressure] : normal venous pressure [No Carotid Bruit] : no carotid bruit [No Murmur] : no murmur [5th Left ICS - MCL] : palpated at the 5th LICS in the midclavicular line [Irregularly Irregular] : irregularly irregular [Normal S1] : normal S1 [Normal S2] : normal S2 [Clear Lung Fields] : clear lung fields [Good Air Entry] : good air entry [Normal Rate] : the respiratory rate was normal [Soft] : abdomen soft [Non Tender] : non-tender [No Masses/organomegaly] : no masses/organomegaly [No Edema] : no edema [No Cyanosis] : no cyanosis [No Clubbing] : no clubbing [No Rash] : no rash [Moves all extremities] : moves all extremities [No Focal Deficits] : no focal deficits [Alert and Oriented] : alert and oriented [Normal memory] : normal memory [Well Nourished] : well nourished [No Acute Distress] : no acute distress [Normal S1, S2] : normal S1, S2 [No Rub] : no rub [No Gallop] : no gallop [No Respiratory Distress] : no respiratory distress  [Normal Bowel Sounds] : normal bowel sounds [Normal Gait] : normal gait [No Varicosities] : no varicosities [No Skin Lesions] : no skin lesions [Normal] : moves all extremities, no focal deficits, normal speech [Normal Speech] : normal speech [de-identified] : tachycardic; + 2/6 diastolic murmur [de-identified] : occasional inspiratory wheeze [de-identified] : 1+ pitting edema b/l

## 2023-01-10 NOTE — DISCUSSION/SUMMARY
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 \par - Check Digoxin level\par - Continue Toprol 25mg bid and eliquis 5mg daily. - cont digoxin 0.125 mg qd for rate control (RV dysfunction on TTE as well)\par - Afib is longstanding - not a good candidate for amio given ILD\par \par #ILD/ORLANDO\par -F/u pulmonary today. Given that pt is rate controlled now and compensated and still SOB despite that, I think there is a large pulmonary component to his SOB. \par \par Follow-up 2 weeks

## 2023-01-10 NOTE — CARDIOLOGY SUMMARY
[de-identified] : EKG 12/13/22: Larry @ 104, MARKO, KIMBERLEYB [de-identified] : ECHO Nov 2022:\par  1. Mildly reduced left ventricular systolic function, 42%\par  2. Normal right ventricular size.\par  3. Reduced right ventricular systolic function.\par  4. Severely dilated left atrium.\par  5. Color flow Doppler reveals evidence of a patent foramen ovale with \par left to right flow vs a small atrial septal defect.\par  6. Aortic sclerosis without significant stenosis.\par  7. Moderate aortic regurgitation.\par  8. Mild mitral regurgitation.\par  9. Moderate tricuspid regurgitation.\par 10. No evidence of pulmonary hypertension.\par 11. No pericardial effusion.\par 12. Mildly dilated aortic root.\par \par ECHO 3/9/2021\par  1. Biatrial enlargement.\par  2. Injection of agitated saline via a peripheral vein reveals bubbles in the left heart within 3 heart beats, most consistent with a patent foramen ovale.\par  3. Mild-to-moderate aortic regurgitation.\par  4. There is mild tricuspid regurgitation. Pulmonary artery systolic pressure (estimated using the tricuspid regurgitant gradient and an estimate of right atrial pressure) is 44 mmHg.\par  5. The right ventricle is dilated. Right ventricular systolic function is normal.\par  6. The left ventricle cavity size is mildly dilated . There is mild concentric left ventricular hypertrophy. Left ventricular systolic function is mildly reduced with a calculated ejection fraction of 45% with global hypokinesis.\par  7. No pericardial effusion.\par  8. The aortic root is dilated measuring 4.40 cm.\par  [de-identified] : CCTA 9/29/2021\par Calcium score is 951 which is 95th percentile\par Mild stenosis of the proximal mid and distal LAD\par Mild stenosis of the proximal mid and distal RCA\par Minimal stenosis of the mid circumflex and OM 2 [de-identified] : Cardiac catheterization 5/9/2017 ([place])\par RCA: Mild diffuse disease\par LM: Mild diffuse disease\par LAD:Mild diffuse disease\par Lcx: no obstruction

## 2023-01-11 LAB
ANION GAP SERPL CALC-SCNC: 8 MMOL/L
BUN SERPL-MCNC: 26 MG/DL
C3 SERPL-MCNC: 97 MG/DL
C4 SERPL-MCNC: 18 MG/DL
CALCIUM SERPL-MCNC: 9 MG/DL
CHLORIDE SERPL-SCNC: 102 MMOL/L
CO2 SERPL-SCNC: 29 MMOL/L
CREAT SERPL-MCNC: 1 MG/DL
DIGOXIN SERPL-MCNC: <0.3 NG/ML
EGFR: 78 ML/MIN/1.73M2
ERYTHROCYTE [SEDIMENTATION RATE] IN BLOOD BY WESTERGREN METHOD: 46 MM/HR
GLUCOSE SERPL-MCNC: 101 MG/DL
MAGNESIUM SERPL-MCNC: 2 MG/DL
NT-PROBNP SERPL-MCNC: 1277 PG/ML
POTASSIUM SERPL-SCNC: 4.1 MMOL/L
RHEUMATOID FACT SER QL: <10 IU/ML
SODIUM SERPL-SCNC: 139 MMOL/L
TSH SERPL-ACNC: 3.14 UIU/ML

## 2023-01-11 NOTE — END OF VISIT
[] : Fellow [FreeTextEntry3] : I personally discussed this patient with the Fellow at the time of the visit. I agree with the assessment and plan as written, unless noted below. \par I was present with the Fellow during the key portions of the history and exam. I agree with the findings and plan as documented in the Fellow 's note, unless noted below.

## 2023-01-11 NOTE — CONSULT LETTER
[Dear  ___] : Dear  [unfilled], [Courtesy Letter:] : I had the pleasure of seeing your patient, [unfilled], in my office today. [Please see my note below.] : Please see my note below. [Consult Closing:] : Thank you very much for allowing me to participate in the care of this patient.  If you have any questions, please do not hesitate to contact me. [Sincerely,] : Sincerely, [DrAlexus  ___] : Dr. CONTI [FreeTextEntry3] : Dr. Walsh

## 2023-01-11 NOTE — PHYSICAL EXAM
[No Acute Distress] : no acute distress [Normal Oropharynx] : normal oropharynx [Normal Appearance] : normal appearance [No Neck Mass] : no neck mass [Normal Rate/Rhythm] : normal rate/rhythm [Normal S1, S2] : normal s1, s2 [No Murmurs] : no murmurs [No Abnormalities] : no abnormalities [Benign] : benign [Normal Gait] : normal gait [No Clubbing] : no clubbing [No Cyanosis] : no cyanosis [FROM] : FROM [Normal Color/ Pigmentation] : normal color/ pigmentation [No Focal Deficits] : no focal deficits [Oriented x3] : oriented x3 [Normal Affect] : normal affect [TextBox_68] : Bilateral velcro rales at the bases R>L [TextBox_105] : 2+ pitting edema bilaterally

## 2023-01-11 NOTE — ASSESSMENT
[FreeTextEntry1] : Data Reviewed:\par CT February 2022: \par No pleural effusions. Persistent chronic lung disease with reticular subpleural opacities throughout both lungs with traction bronchiectasis in the lung bases\par \par PFTs 2021:\par FEV1/FVC ratio: 83, FEV1 97, FVC 87, TLC 84, RV 87, DLCO 63 Moderately reduced DLCO\par \par Labs from 12/2022 reviewed: CBC without eosinophilia\par \par Impression/Plan:\par #ILD\par CT chest showed b/l subpleural reticular opacities, areas of GGO, traction bronchiectasis, bronchiolectasis and mosaic attenuation. Pattern indeterminate for UIP \par - He would need to have repeat PFTs and 6MWT to assess lung function \par - In addition, he requires HRCT as there is concern his lung disease has progressed \par - Will also repeat ILD blood work including but not limited to autoimmune workup, inflammatory markers, and CTD workup\par - Will follow up in 2 weeks to discuss results and review imaging prior to discussing further management\par \par #ORLANDO\par - Patient has a history of ORLANDO for almost a decade but has not been on treatment\par - His symptoms (both cardiovascular and pulmonary) are likely being exacerbated by his untreated ORLANDO\par - WIll order sleep study to evaluate for severity and necessity for CPAP\par \par F/u in 2 weeks with Dr. Walsh\par \par \par

## 2023-01-13 LAB
ACE BLD-CCNC: 21 U/L
ALTERN TENCAPG(M6): 6.3 MCG/ML
ANA PAT FLD IF-IMP: ABNORMAL
ANA SER IF-ACNC: ABNORMAL
ASPER FUMCAPG(M3): 15.2 MCG/ML
AUREOBASCAPG(M12): 4.1 MCG/ML
BASOPHILS # BLD AUTO: 0.02 K/UL
BASOPHILS NFR BLD AUTO: 0.3 %
CARDIOLIPIN IGM SER-MCNC: 6.7 GPL
CARDIOLIPIN IGM SER-MCNC: 8.6 MPL
CCP AB SER IA-ACNC: <8 UNITS
CENTROMERE IGG SER-ACNC: <0.2 CD:130001892
DEPRECATED CARDIOLIPIN IGA SER: 5.2 APL
DSDNA AB SER-ACNC: <12 IU/ML
ENA RNP AB SER IA-ACNC: 0.6 AL
ENA SCL70 IGG SER IA-ACNC: <0.2 AL
ENA SM AB SER IA-ACNC: <0.2 AL
ENA SS-A AB SER IA-ACNC: <0.2 AL
ENA SS-B AB SER IA-ACNC: <0.2 AL
EOSINOPHIL # BLD AUTO: 0.24 K/UL
EOSINOPHIL NFR BLD AUTO: 3.9 %
HCT VFR BLD CALC: 35.3 %
HGB BLD-MCNC: 10.6 G/DL
IMM GRANULOCYTES NFR BLD AUTO: 0.2 %
LACEYELLA SACCHARI IGG: 4 MCG/ML
LYMPHOCYTES # BLD AUTO: 1.26 K/UL
LYMPHOCYTES NFR BLD AUTO: 20.3 %
MAN DIFF?: NORMAL
MCHC RBC-ENTMCNC: 23.5 PG
MCHC RBC-ENTMCNC: 30 GM/DL
MCV RBC AUTO: 78.3 FL
MICROPOLYCAPG(M22): 3.9 MCG/ML
MONOCYTES # BLD AUTO: 0.57 K/UL
MONOCYTES NFR BLD AUTO: 9.2 %
NEUTROPHILS # BLD AUTO: 4.12 K/UL
NEUTROPHILS NFR BLD AUTO: 66.1 %
PENIC CHRYCAPG(M1): 13.6 MCG/ML
PHOMA BETAE IGG: 3.1 MCG/ML
PLATELET # BLD AUTO: 176 K/UL
RBC # BLD: 4.51 M/UL
RBC # FLD: 20.1 %
RF+CCP IGG SER-IMP: NEGATIVE
TRICHODERMA VIRIDE IGG: 4.5 MCG/ML
WBC # FLD AUTO: 6.22 K/UL

## 2023-01-18 ENCOUNTER — APPOINTMENT (OUTPATIENT)
Dept: CT IMAGING | Facility: HOSPITAL | Age: 78
End: 2023-01-18
Payer: MEDICARE

## 2023-01-24 RX ORDER — FUROSEMIDE 40 MG/1
40 TABLET ORAL DAILY
Qty: 30 | Refills: 3 | Status: ACTIVE | COMMUNITY
Start: 2022-09-14 | End: 1900-01-01

## 2023-01-25 ENCOUNTER — APPOINTMENT (OUTPATIENT)
Dept: PULMONOLOGY | Facility: CLINIC | Age: 78
End: 2023-01-25
Payer: COMMERCIAL

## 2023-01-25 VITALS
HEIGHT: 72 IN | WEIGHT: 194 LBS | HEART RATE: 107 BPM | SYSTOLIC BLOOD PRESSURE: 116 MMHG | OXYGEN SATURATION: 96 % | BODY MASS INDEX: 26.28 KG/M2 | DIASTOLIC BLOOD PRESSURE: 70 MMHG | TEMPERATURE: 97.2 F

## 2023-01-25 DIAGNOSIS — J84.170 INTERSTITIAL LUNG DISEASE W/PROG FIBROTIC PHENOTYPE,DISEASE CLASS ELSEWHERE: ICD-10-CM

## 2023-01-25 PROCEDURE — 94010 BREATHING CAPACITY TEST: CPT

## 2023-01-25 PROCEDURE — 99214 OFFICE O/P EST MOD 30 MIN: CPT | Mod: 25

## 2023-01-25 RX ORDER — NINTEDANIB 150 MG/1
150 CAPSULE ORAL TWICE DAILY
Qty: 60 | Refills: 3 | Status: ACTIVE | COMMUNITY
Start: 2023-01-25 | End: 1900-01-01

## 2023-01-25 NOTE — PHYSICAL EXAM
[No Acute Distress] : no acute distress [Supple] : supple [Normal S1, S2] : normal s1, s2 [No Resp Distress] : no resp distress [No Acc Muscle Use] : no acc muscle use [No Clubbing] : no clubbing [2+ Pitting] : 2+ pitting [Normal Affect] : normal affect [TextBox_68] : bibasilar crackles

## 2023-01-25 NOTE — ASSESSMENT
[FreeTextEntry1] : Data Reviewed:\par CT February 2022: \par No pleural effusions. Persistent chronic lung disease with reticular subpleural opacities throughout both lungs with traction bronchiectasis in the lung bases\par \par PFTs 2021:FEV1/FVC ratio: 83, FEV1 97, FVC 87, TLC 84, RV 87, DLCO 63 Moderately reduced DLCO\par Spirometry 1-25-23 combined obstructive and restrictive defect. FVC 51%, FEV1 38%, FEv1/FVC 0.55 \par \par Labs: cbc wihtout eosinophilia, YVONNE positive 1:160 speckled, ESR 46 HP panel negative \par \par Impression/Plan:\par #ILD\par CT chest showed b/l subpleural reticular opacities, areas of GGO, traction bronchiectasis, bronchiolectasis and mosaic attenuation. Pattern indeterminate for UIP. unclear etiology ? chronic HP \par - CTD work up with positive YVONNE. referral top rheum given today\par - instructions provided agin for hRCT\par - given functional decline and signficant decline in FEV1, start ofeve, we discussed side effects. \par - i disucssed at length that fibrosis is not reversible\par - not planning for bronch with BAL and envisia given age, dome dementia and he is completely alone without friends or family thus risk of procedure > benefits \par \par #ORLANDO\par - Patient has a history of ORLANDO for almost a decade but has not been on treatment\par - His symptoms (both cardiovascular and pulmonary) are likely being exacerbated by his untreated ORLANDO\par - WIll order sleep study to evaluate for severity and necessity for CPAP\par RTC in 3-4 weeks, check lfts at that time \par

## 2023-01-25 NOTE — REVIEW OF SYSTEMS
[Fatigue] : fatigue [Cough] : cough [Dyspnea] : dyspnea [SOB on Exertion] : sob on exertion [Obesity] : obesity [Fever] : no fever [Chills] : no chills [Chest Discomfort] : no chest discomfort [Orthopnea] : no orthopnea [GERD] : no gerd [Abdominal Pain] : no abdominal pain [Diarrhea] : no diarrhea

## 2023-01-25 NOTE — HISTORY OF PRESENT ILLNESS
[TextBox_4] : 76 y/o male with PMH of Afib (on Eliquis, Metoprolol, and Digoxin), HFmrEF, CAD, aortic insufficiency, and suspected ILD and OR[place]O who presents to the clinic for evaluation of shortness of breath. He says his shortness of breath initially started about 5-6 years ago and it has just progressively gotten worse since that time. He has a 3-4 pack year history and quit smoking 25 years ago when he moved to [place] from [place]. He worked at [employer] for about 5 years ago in [place] and once he moved to the  he worked at an [employer] for over 20 years. He says that in his line of work he was constantly exposed to smoke and chemicals. He denies any recent fevers, chills, and infections or sick contacts. He is compliant with his medications. He was diagnosed with ORLANDO about 9 years ago but notes he never used CPAP because he didn’t feel he needed it. He notes that for the past 12 months he has had a productive cough with white sputum production. He denies ever being on any inhalers. \par 1-25-22 no change in symptoms. remakrs on how he got the day wrong for the ct and then did not reschedule. feels like things have changed a lot in terms of his functional status. notes that about 6 years ago someone at [facility] told him he had scarring in his left lung.

## 2023-01-30 NOTE — PHYSICAL EXAM
[Well Nourished] : well nourished [No Acute Distress] : no acute distress [Normal S1, S2] : normal S1, S2 [No Rub] : no rub [No Gallop] : no gallop [No Respiratory Distress] : no respiratory distress  [Normal Bowel Sounds] : normal bowel sounds [Normal Gait] : normal gait [No Varicosities] : no varicosities [No Skin Lesions] : no skin lesions [Normal] : moves all extremities, no focal deficits, normal speech [Normal Speech] : normal speech [de-identified] : tachycardic; + 2/6 diastolic murmur [de-identified] : occasional inspiratory wheeze [de-identified] : 1+ pitting edema b/l

## 2023-01-30 NOTE — HISTORY OF PRESENT ILLNESS
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 \par \par 1/31/23:Started on Ofev last wet week by Pulm. \par \par 1/10/23: Digoxin was started on last visit and Metoprolol ER was increased to twice per day. Pt is stilll not feeling well. He is very SOB and dyspneic on exertion. No chest pain. Stable chronic LE edema. \par \par 12/20/22: Leg swelling and dyspnea slightly improved. Still c/o dyspnea though. HR between 96 - and 111\par BP wnl\par \par 12/13/22: He complains of dyspnea on exertion and leg swelling over the past several months that has gotten progressively worse. He was recently started on lasix 40mg daily but did not subsequently follow-up. States he has not been taking his metoprolol for unclear reasons, possible he ran out and did not refill. He has been taking his other meds. He has not been compliant with CPAP for several years. \par \par \par \par \par \par \par

## 2023-01-30 NOTE — CARDIOLOGY SUMMARY
[de-identified] : EKG 12/13/22: Larry @ 104, MARKO, KIMBERLEYB [de-identified] : ECHO Nov 2022:\par  1. Mildly reduced left ventricular systolic function, 42%\par  2. Normal right ventricular size.\par  3. Reduced right ventricular systolic function.\par  4. Severely dilated left atrium.\par  5. Color flow Doppler reveals evidence of a patent foramen ovale with \par left to right flow vs a small atrial septal defect.\par  6. Aortic sclerosis without significant stenosis.\par  7. Moderate aortic regurgitation.\par  8. Mild mitral regurgitation.\par  9. Moderate tricuspid regurgitation.\par 10. No evidence of pulmonary hypertension.\par 11. No pericardial effusion.\par 12. Mildly dilated aortic root.\par \par ECHO 3/9/2021\par  1. Biatrial enlargement.\par  2. Injection of agitated saline via a peripheral vein reveals bubbles in the left heart within 3 heart beats, most consistent with a patent foramen ovale.\par  3. Mild-to-moderate aortic regurgitation.\par  4. There is mild tricuspid regurgitation. Pulmonary artery systolic pressure (estimated using the tricuspid regurgitant gradient and an estimate of right atrial pressure) is 44 mmHg.\par  5. The right ventricle is dilated. Right ventricular systolic function is normal.\par  6. The left ventricle cavity size is mildly dilated . There is mild concentric left ventricular hypertrophy. Left ventricular systolic function is mildly reduced with a calculated ejection fraction of 45% with global hypokinesis.\par  7. No pericardial effusion.\par  8. The aortic root is dilated measuring 4.40 cm.\par  [de-identified] : CCTA 9/29/2021\par Calcium score is 951 which is 95th percentile\par Mild stenosis of the proximal mid and distal LAD\par Mild stenosis of the proximal mid and distal RCA\par Minimal stenosis of the mid circumflex and OM 2 [de-identified] : Cardiac catheterization 5/9/2017 ([place])\par RCA: Mild diffuse disease\par LM: Mild diffuse disease\par LAD:Mild diffuse disease\par Lcx: no obstruction

## 2023-01-31 ENCOUNTER — APPOINTMENT (OUTPATIENT)
Dept: HEART AND VASCULAR | Facility: CLINIC | Age: 78
End: 2023-01-31

## 2023-02-07 ENCOUNTER — APPOINTMENT (OUTPATIENT)
Dept: PULMONOLOGY | Facility: CLINIC | Age: 78
End: 2023-02-07
Payer: COMMERCIAL

## 2023-02-07 PROCEDURE — 94727 GAS DIL/WSHOT DETER LNG VOL: CPT

## 2023-02-07 PROCEDURE — 94060 EVALUATION OF WHEEZING: CPT

## 2023-02-07 PROCEDURE — ZZZZZ: CPT

## 2023-02-07 PROCEDURE — 94729 DIFFUSING CAPACITY: CPT

## 2023-02-07 PROCEDURE — 94618 PULMONARY STRESS TESTING: CPT

## 2023-02-09 ENCOUNTER — APPOINTMENT (OUTPATIENT)
Dept: PULMONOLOGY | Facility: CLINIC | Age: 78
End: 2023-02-09
Payer: COMMERCIAL

## 2023-02-09 VITALS
BODY MASS INDEX: 25.73 KG/M2 | HEIGHT: 72 IN | TEMPERATURE: 97.9 F | WEIGHT: 190 LBS | SYSTOLIC BLOOD PRESSURE: 120 MMHG | OXYGEN SATURATION: 95 % | HEART RATE: 102 BPM | DIASTOLIC BLOOD PRESSURE: 79 MMHG

## 2023-02-09 DIAGNOSIS — G47.33 OBSTRUCTIVE SLEEP APNEA (ADULT) (PEDIATRIC): ICD-10-CM

## 2023-02-09 DIAGNOSIS — J84.10 PULMONARY FIBROSIS, UNSPECIFIED: ICD-10-CM

## 2023-02-09 PROCEDURE — 99214 OFFICE O/P EST MOD 30 MIN: CPT

## 2023-02-09 NOTE — ASSESSMENT
[FreeTextEntry1] : Data Reviewed:\par CT February 2022: \par No pleural effusions. Persistent chronic lung disease with reticular subpleural opacities throughout both lungs with traction bronchiectasis in the lung bases\par \par PFTs 2021:FEV1/FVC ratio: 83, FEV1 97, FVC 87, TLC 84, RV 87, DLCO 63 Moderately reduced DLCO\par Spirometry 1-25-23 combined obstructive and restrictive defect. FVC 51%, FEV1 38%, FEv1/FVC 0.55 \par \par PFT 2-7-23 FVC 81%, FEv1 92%, FEV1/FVC 0.81 no signficant bronchodilator response. TLC 72% DLCO 40%\par 6mwt distance walked 264 meters. 96% o2 sat rest and 90% with exertion.\par Labs: cbc wihtout eosinophilia, YVONNE positive 1:160 speckled, ESR 46 HP panel negative \par \par Impression/Plan:\par #ILD\par CT chest showed b/l subpleural reticular opacities, areas of GGO, traction bronchiectasis, bronchiolectasis and mosaic attenuation. Pattern indeterminate for UIP. unclear etiology ? chronic HP \par PFT with mild restriction and mdoerately reduced diffusion capacity (signficant change in diffusion since 2021). he does desaturate with exertion but not below 90% \par - CTD work up with positive YVONNE. referral top rheum given\par - we have scheduled hi HRCT for 2-27-23 \par - given functional decline and signficant decline in FEV1, start ofeve, we discussed side effects. i gave him the forms to help with copay \par - i disucssed at length that fibrosis is not reversible\par - not planning for bronch with BAL and envisia given age, dome dementia and he is completely alone without friends or family thus risk of procedure > benefits \par \par #ORLANDO\par - Patient has a history of ORLANDO for almost a decade but has not been on treatment\par - His symptoms (both cardiovascular and pulmonary) are likely being exacerbated by his untreated ORLANDO\par - sleep study ordered -auth pending \par \par RTC in 2 months\par \par \par

## 2023-02-09 NOTE — HISTORY OF PRESENT ILLNESS
[TextBox_4] : 76 y/o male with PMH of Afib (on Eliquis, Metoprolol, and Digoxin), HFmrEF, CAD, aortic insufficiency, and suspected ILD and ORLANDO who presents to the clinic for evaluation of shortness of breath. He says his shortness of breath initially started about 5-6 years ago and it has just progressively gotten worse since that time. He has a 3-4 pack year history and quit smoking 25 years ago when he moved to [place] from [place]. He worked at [employer] for about 5 years ago in [employer] and once he moved to the  he worked at an [employer] for over 20 years. He says that in his line of work he was constantly exposed to smoke and chemicals. He denies any recent fevers, chills, and infections or sick contacts. He is compliant with his medications. He was diagnosed with ORLANDO about 9 years ago but notes he never used CPAP because he didn’t feel he needed it. He notes that for the past 12 months he has had a productive cough with white sputum production. He denies ever being on any inhalers. \par 2-9-23 has been sleeping in the rocking chair instead of a bed because he was unable to complete the work he started in his apartment. has a lot of back pain. unable to get ofev because of signficant co-pay. workign with our clinical pahrmacisit to help with that. did not get ct rescheduled either. requesting referral to GI for colonoscopy. hehas not had one in 20 years. 
No

## 2023-02-16 ENCOUNTER — APPOINTMENT (OUTPATIENT)
Dept: GASTROENTEROLOGY | Facility: CLINIC | Age: 78
End: 2023-02-16
Payer: MEDICARE

## 2023-02-16 VITALS
HEART RATE: 97 BPM | SYSTOLIC BLOOD PRESSURE: 102 MMHG | BODY MASS INDEX: 26.14 KG/M2 | DIASTOLIC BLOOD PRESSURE: 70 MMHG | WEIGHT: 193 LBS | HEIGHT: 72 IN | RESPIRATION RATE: 16 BRPM | TEMPERATURE: 96.3 F | OXYGEN SATURATION: 97 %

## 2023-02-16 DIAGNOSIS — D64.9 ANEMIA, UNSPECIFIED: ICD-10-CM

## 2023-02-16 DIAGNOSIS — Z12.11 ENCOUNTER FOR SCREENING FOR MALIGNANT NEOPLASM OF COLON: ICD-10-CM

## 2023-02-16 PROCEDURE — 99203 OFFICE O/P NEW LOW 30 MIN: CPT

## 2023-02-16 RX ORDER — ESOMEPRAZOLE MAGNESIUM 40 MG/1
40 CAPSULE, DELAYED RELEASE ORAL
Qty: 30 | Refills: 0 | Status: DISCONTINUED | COMMUNITY
Start: 2021-04-12 | End: 2023-02-16

## 2023-02-16 RX ORDER — POTASSIUM CHLORIDE 1500 MG/1
20 TABLET, FILM COATED, EXTENDED RELEASE ORAL
Qty: 30 | Refills: 0 | Status: DISCONTINUED | COMMUNITY
Start: 2022-11-23 | End: 2023-02-16

## 2023-02-17 DIAGNOSIS — E61.1 IRON DEFICIENCY: ICD-10-CM

## 2023-02-17 LAB
FERRITIN SERPL-MCNC: 12 NG/ML
IRON SATN MFR SERPL: 4 %
IRON SERPL-MCNC: 20 UG/DL
RBC # BLD: 4.78 M/UL
RETICS # AUTO: 1 %
RETICS AGGREG/RBC NFR: 46.8 K/UL
TIBC SERPL-MCNC: 467 UG/DL
UIBC SERPL-MCNC: 447 UG/DL

## 2023-02-17 NOTE — HISTORY OF PRESENT ILLNESS
[FreeTextEntry1] : 76 y/o M with h/o ORLANDO (non compliant with CPAP), ILD, heart failure (LVEF 42%), nonobstructive CAD, aortic insufficiency and persistent afib on (eliquis 5 mg BID, Metoprolol and Digoxin) presents for colon cancer screening. Pt is a poor historian.  Pt is following with Dr. Chino cardiologist.  Pt reports he has not had a colonoscopy in 25 years and does not recall having polyps. Denies fever, chill, cp, sob, abd pain, nausea, vomiting, diarrhea, hematochezia, flatulence, belching, dysphagia, or odynophagia, unintentional wt loss or loss of appetite.\par \par He admits to a history of constipation. Pt reports he sits on the toilet for an extended period of time in order to have a BM. Reports the rectum area is extremely painful when sitting down. Pt reports he has a BM 2-3x week without blood on the stool or toilet paper.  He admits to increased thirst and drinks seltzer soliman. He takes OTC stool softeners which improve his straining. He has never tried Miralax. \par \par Admits to intermittent SOB. Following with Dr. Jillian perez who prescribed OFEV. He said his SOB started 5-6 years ago and has gotten worse. 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 He was surrounded by asbestosis. \par \par \par Most recent c-scope revealed Hgb 10.6, HCT 35.3, MCV 78.3, MCHC 30 consistent with MAXWELL. \par \par Diet: No specific appetite, eats well \par Alcohol: Denies\par Smoking: Quit 33 years ago\par Allergies: Denies\par FH: Denies FH colon, stomach, pancreatic cancer\par 4 brothers in [place]

## 2023-02-17 NOTE — ASSESSMENT
[FreeTextEntry1] : 76 y/o M with h/o ORLANDO (non compliant with CPAP), ILD, heart failure (LVEF 42%), nonobstructive CAD, aortic insufficiency and persistent afib on (eliquis 5 mg BID, Metoprolol and Digoxin) presents for colon cancer screening)\par - Discussed in length with Dr. Burgos, Dr. Chino and Dr Walsh the need for colonoscopy. It was decided at this time that the patient should proceed with cologuard testing as he is a high risk patient for colonoscopy due to his cardiac and pulmonary history. A non-invasive screen is the next step. If cologuard results are positive, then will consider colonoscopy.\par \par Anemia\par - Recent blood work consistent with MAXWELL, concerning for possible GI bleed\par - Admits to frequent SOB but known to have ILD, no chest pain\par - Iron 20, TIBC 467, Iron Sat 4, Ferritin 12 \par - Pt will start Feriva iron supplementation\par - Pt advised to f/u with PCP\par

## 2023-02-21 ENCOUNTER — OUTPATIENT (OUTPATIENT)
Dept: OUTPATIENT SERVICES | Facility: HOSPITAL | Age: 78
LOS: 1 days | End: 2023-02-21
Payer: MEDICARE

## 2023-02-21 ENCOUNTER — APPOINTMENT (OUTPATIENT)
Dept: CT IMAGING | Facility: HOSPITAL | Age: 78
End: 2023-02-21
Payer: MEDICARE

## 2023-02-21 ENCOUNTER — RESULT REVIEW (OUTPATIENT)
Age: 78
End: 2023-02-21

## 2023-02-21 PROCEDURE — 71250 CT THORAX DX C-: CPT | Mod: 26

## 2023-02-21 PROCEDURE — 71250 CT THORAX DX C-: CPT

## 2023-02-21 RX ORDER — IRON/C/B12/FOLATE/ZINC/SUCCIN 75MG-175MG
75-1 TABLET ORAL
Qty: 30 | Refills: 2 | Status: ACTIVE | COMMUNITY
Start: 2023-02-17 | End: 1900-01-01

## 2023-06-26 RX ORDER — APIXABAN 5 MG/1
5 TABLET, FILM COATED ORAL
Qty: 180 | Refills: 3 | Status: ACTIVE | COMMUNITY
Start: 1900-01-01 | End: 1900-01-01

## 2023-10-10 RX ORDER — DAPAGLIFLOZIN 10 MG/1
10 TABLET, FILM COATED ORAL
Qty: 90 | Refills: 3 | Status: ACTIVE | COMMUNITY
Start: 2022-12-13 | End: 1900-01-01